# Patient Record
Sex: FEMALE | Race: OTHER | HISPANIC OR LATINO
[De-identification: names, ages, dates, MRNs, and addresses within clinical notes are randomized per-mention and may not be internally consistent; named-entity substitution may affect disease eponyms.]

---

## 2018-12-07 ENCOUNTER — APPOINTMENT (OUTPATIENT)
Dept: PLASTIC SURGERY | Facility: CLINIC | Age: 48
End: 2018-12-07
Payer: SELF-PAY

## 2018-12-07 VITALS
WEIGHT: 145 LBS | SYSTOLIC BLOOD PRESSURE: 118 MMHG | DIASTOLIC BLOOD PRESSURE: 72 MMHG | RESPIRATION RATE: 20 BRPM | OXYGEN SATURATION: 100 % | HEART RATE: 73 BPM | BODY MASS INDEX: 26.68 KG/M2 | TEMPERATURE: 98.2 F | HEIGHT: 62 IN

## 2018-12-07 PROCEDURE — 99201 OFFICE OUTPATIENT NEW 10 MINUTES: CPT | Mod: NC

## 2019-04-26 ENCOUNTER — APPOINTMENT (OUTPATIENT)
Dept: PLASTIC SURGERY | Facility: CLINIC | Age: 49
End: 2019-04-26

## 2019-06-20 ENCOUNTER — APPOINTMENT (OUTPATIENT)
Dept: PLASTIC SURGERY | Facility: CLINIC | Age: 49
End: 2019-06-20
Payer: SELF-PAY

## 2019-06-20 PROCEDURE — 99211 OFF/OP EST MAY X REQ PHY/QHP: CPT | Mod: NC

## 2019-06-20 NOTE — HISTORY OF PRESENT ILLNESS
[FreeTextEntry1] : pt is 49 y/o f c/o abdominal laxity s/p c sections and lipodystrophy  she desires abdominoplasty and dr repair and also liposuction of the hips and flanks  rbal/s outlined

## 2019-06-20 NOTE — ASSESSMENT
[FreeTextEntry1] : Ms. MERLE OBANDO is a 49 year old woman who has consulted with me regarding improving the contour of her  her abdomen. MERLE  is a candidate for abdominoplasty and diastasis rectus repair.  The permanent scar along the lower abdomen from hip to hip and around the umbilicus, was demonstrated and explained.   There is no hernia present and some liposuction may be required along the lateral hip and flank  area to further improve the contour.  Drains will be used and then removed in one week.  The risks, benefits, alternatives, limitations, and the permanent scars were outlined with the patient and She will consider scheduling surgery under general anesthesia at a convenient time.  All questions were answered.\par

## 2019-06-20 NOTE — ASSESSMENT
[FreeTextEntry1] : h/o liposuction will redo areas and do small skin excision rbal/s outlined  limitations of skin laxity and prior scar tissue and surgery discussed and accepted by pt

## 2019-06-20 NOTE — HISTORY OF PRESENT ILLNESS
[FreeTextEntry1] : 2 nd consult  pt adamantly does not want abdominoplasty  she had liposuction and has very wavy and uneven result  she has pfan scar lower abdomen and will accept sl extension for skin removal. pt wishes liposuction abdomen hips upper buttocks lateral breast, chest wall limitations stressed

## 2019-07-17 ENCOUNTER — APPOINTMENT (OUTPATIENT)
Dept: PLASTIC SURGERY | Facility: HOSPITAL | Age: 49
End: 2019-07-17
Payer: SELF-PAY

## 2019-07-17 PROCEDURE — 15847 EXC SKIN ABD ADD-ON: CPT | Mod: NC,59

## 2019-07-17 PROCEDURE — 15877 SUCTION LIPECTOMY TRUNK: CPT

## 2019-07-25 ENCOUNTER — APPOINTMENT (OUTPATIENT)
Dept: PLASTIC SURGERY | Facility: CLINIC | Age: 49
End: 2019-07-25
Payer: SELF-PAY

## 2019-07-25 PROCEDURE — 99024 POSTOP FOLLOW-UP VISIT: CPT

## 2019-07-26 NOTE — ASSESSMENT
[FreeTextEntry1] : excellent post op appearance after liposuction and skin excision . uncomplicated course  pt to rto 2 weeks for prineo removal

## 2019-08-06 ENCOUNTER — APPOINTMENT (OUTPATIENT)
Dept: PLASTIC SURGERY | Facility: CLINIC | Age: 49
End: 2019-08-06

## 2019-08-08 ENCOUNTER — APPOINTMENT (OUTPATIENT)
Dept: PLASTIC SURGERY | Facility: CLINIC | Age: 49
End: 2019-08-08
Payer: SELF-PAY

## 2019-08-08 PROCEDURE — 99024 POSTOP FOLLOW-UP VISIT: CPT

## 2019-08-15 ENCOUNTER — APPOINTMENT (OUTPATIENT)
Dept: PLASTIC SURGERY | Facility: CLINIC | Age: 49
End: 2019-08-15
Payer: SELF-PAY

## 2019-08-15 PROCEDURE — 99024 POSTOP FOLLOW-UP VISIT: CPT

## 2019-09-18 NOTE — ASSESSMENT
[FreeTextEntry1] : excellent early appearance and uncomplicated recovery after sal and mini abdominoplasty

## 2019-09-18 NOTE — HISTORY OF PRESENT ILLNESS
[FreeTextEntry1] : pt is doing very well and is happy with results of her surgery . The patient denies fever,chills, shortness of breath, chest pain, calf pain.  good contour and scar is well healed . \par

## 2019-10-17 ENCOUNTER — APPOINTMENT (OUTPATIENT)
Dept: PLASTIC SURGERY | Facility: CLINIC | Age: 49
End: 2019-10-17
Payer: SELF-PAY

## 2019-10-17 PROCEDURE — 99024 POSTOP FOLLOW-UP VISIT: CPT

## 2019-10-30 NOTE — ASSESSMENT
[FreeTextEntry1] : pt has nice improvements but is not satisfied.  will consider more liposuction inn the office for further reduction upper buttocks possibly lateral chest

## 2019-10-30 NOTE — HISTORY OF PRESENT ILLNESS
[FreeTextEntry1] : pt is happy but wants more liposuction of the buttocks and lateral breast  rbal/s outlined  pt will try to lose some weight and rto in the early spring and if needed will do office procedure

## 2020-01-14 ENCOUNTER — APPOINTMENT (OUTPATIENT)
Dept: PLASTIC SURGERY | Facility: CLINIC | Age: 50
End: 2020-01-14

## 2020-01-16 NOTE — HISTORY OF PRESENT ILLNESS
[FreeTextEntry1] : pt had liposuction and miniabdominoplasty and states that she is happy but desires more off her buttocks and more off her upper lateral breasts.  these are areas that were not even treated the upper buttock contour is excellent where that fat was removed and the lateral chest has excellent contour as well.  pt encouraged to diet and exercise and have a mastopexy for elevation of the nipple.  she can also remove her implants or go to a smaller size  to reduce upper pole fullness

## 2020-01-16 NOTE — ASSESSMENT
[FreeTextEntry1] : pt looks great and surgical sites are excellent .  pt did not want full abdominoplasty and upper abdomen skin is loose as expected  areas of concern are not addressed by liposuction  recommend losing weight and exercising

## 2020-03-20 ENCOUNTER — APPOINTMENT (OUTPATIENT)
Dept: PLASTIC SURGERY | Facility: CLINIC | Age: 50
End: 2020-03-20
Payer: COMMERCIAL

## 2020-03-20 VITALS
HEIGHT: 62 IN | TEMPERATURE: 98.7 F | DIASTOLIC BLOOD PRESSURE: 72 MMHG | WEIGHT: 140 LBS | BODY MASS INDEX: 25.76 KG/M2 | HEART RATE: 67 BPM | SYSTOLIC BLOOD PRESSURE: 111 MMHG | RESPIRATION RATE: 18 BRPM | OXYGEN SATURATION: 99 %

## 2020-03-20 PROCEDURE — 99214 OFFICE O/P EST MOD 30 MIN: CPT

## 2020-03-20 NOTE — PHYSICAL EXAM
[NI] : Normal [de-identified] : nl sens no mass grade 2 ptosis snoopy deformity with ptosis of breast over implants grade 1 capsules no mass

## 2020-03-20 NOTE — ASSESSMENT
[FreeTextEntry1] : ruptured implant r side and pt desires removal of both implants with lollipop mastopexy and no reinsertion of implants at this tiem The risks, benefits, alternatives, limitations and the permanent scars were outlined with the patient. will schedule elective removal and mastopexy bilateral

## 2020-03-20 NOTE — HISTORY OF PRESENT ILLNESS
[FreeTextEntry1] : pt is 48y/o his f s/p breast aug 2005 in [place].  she had mammo sono and showed rupture of the r implant. l side is intact but pt has elected to remove it as the r side needs to be removed . she does not wish to have implants any more because she has increased in size since the birth of her children .  she does however wish to have a mastopexy and is a candidate for vertical scar lollipop mastopexy.  she has soft grade 1 capsules  The risks, benefits, alternatives, limitations and the permanent scars were outlined with the patient. she has normal sensation  bilaterally . I anticipate she will be a b or small c at the most nipples are at 26 cm bialterally

## 2020-04-26 ENCOUNTER — MESSAGE (OUTPATIENT)
Age: 50
End: 2020-04-26

## 2020-06-30 ENCOUNTER — APPOINTMENT (OUTPATIENT)
Dept: PLASTIC SURGERY | Facility: CLINIC | Age: 50
End: 2020-06-30

## 2020-07-08 ENCOUNTER — APPOINTMENT (OUTPATIENT)
Dept: BARIATRICS | Facility: CLINIC | Age: 50
End: 2020-07-08
Payer: COMMERCIAL

## 2020-07-08 VITALS — HEIGHT: 62 IN | WEIGHT: 140 LBS | BODY MASS INDEX: 25.76 KG/M2

## 2020-07-08 DIAGNOSIS — Z83.3 FAMILY HISTORY OF DIABETES MELLITUS: ICD-10-CM

## 2020-07-08 PROCEDURE — 99204 OFFICE O/P NEW MOD 45 MIN: CPT | Mod: 95

## 2020-07-08 NOTE — HISTORY OF PRESENT ILLNESS
[FreeTextEntry1] : 51 y/o Female here for weight loss. \par Struggling with weight gain since she turned 43 y/o and after having her twin children 10 years ago.\par Highest adult weight 150, Lowest Adult Weight 110\par Goal weight 125\par Currently weighs 140\par Childhood: skinny as a child\par LMP: started 7/6/20, uses condoms and cream for contraception\par Previous weight loss attempts:had liposuction in 2019- lost 10 lbs. Exercised at gym and enjoyed swimming before having children. Denies dieting, but tries to eat healthy (salads, lentil soups, vegetables & fruits). Denies snacking/eating dessert.\par Food recall yesterday- B: coffee, L: Ramen soup, D: 1 slice of homemade pizza\par Exercise: walks 2-3 times per week with a friend for 2 hours. Has access to indoor bicycle- not currently using. \par Water intake: sufficient\par Sleep: 8-9 hours per night \par Occupation: works at [employer] [place] [employer]- currently working from home\par Motivation: wants to feel happy/comfortable in a bathing suit\par Pt is scheduled to have surgery on 7/27 for breast implant removal with Dr. Sánchez.

## 2020-07-08 NOTE — ASSESSMENT
[FreeTextEntry1] : Lifestyle modification- keep food journal. Focus on healthy diet: lean meats, vegetables, fruits. Discussed appropriate portion sizes for meals. Will review at next session. Exercise: goal of 2-3 walks per week and add 2 bicycle sessions per week. \par Labs-CBC, CMP, Thyroid function studies, Lipid Panel, A1C\par Medication: pt would like medication for weight loss if she is not successful with lifestyle modification. \par RTO in 1 month to review food journal/labs/options going forward.

## 2020-07-08 NOTE — REASON FOR VISIT
[Home] : at home, [unfilled] , at the time of the visit. [Other Location: e.g. Home (Enter Location, City,State)___] : at [unfilled] [Verbal consent obtained from patient] : the patient, [unfilled] [Initial Consultation] : an initial consultation for [Obesity] : obesity

## 2020-07-21 ENCOUNTER — APPOINTMENT (OUTPATIENT)
Dept: PLASTIC SURGERY | Facility: CLINIC | Age: 50
End: 2020-07-21

## 2020-07-23 ENCOUNTER — APPOINTMENT (OUTPATIENT)
Dept: PLASTIC SURGERY | Facility: CLINIC | Age: 50
End: 2020-07-23
Payer: COMMERCIAL

## 2020-07-23 PROCEDURE — 99215 OFFICE O/P EST HI 40 MIN: CPT

## 2020-07-23 NOTE — ASSESSMENT
[FreeTextEntry1] : pt has ruptured implant and wants both removed and not replaced  .  when she is able she will do a mastopexy but can not do that at this time.  we are currently attempting to get an appeal completed so she can have both implants removed, not just one that is ruptured

## 2020-07-23 NOTE — HISTORY OF PRESENT ILLNESS
[FreeTextEntry1] : pt here for preop discussion regarding the removal of her ruptured implant  . we discussed that the reviewing physician abdi refused to approve both breasts and then decided to rescind the first approval as well.  pt understands we are in a process of appeal and awaiting a decision.  in the event the insurance does not cover both breasts I have assured her that we will try further to care for her in the office if that is possible . The risks, benefits, alternatives, limitations and the permanent scars were outlined with the patient. the patient is very upset at the way the insurance carrier is handling her situation. \par

## 2020-07-23 NOTE — PHYSICAL EXAM
[NI] : Normal [de-identified] : nl sens no mass grade 2 ptosis snoopy deformity with ptosis of breast over implants grade 1 capsules no mass

## 2020-07-23 NOTE — REVIEW OF SYSTEMS
[Recent Weight Gain (___ Lbs)] : recent [unfilled] ~Ulb weight gain [de-identified] : anxiety about her ruptured implant  [Anxiety] : anxiety

## 2020-07-27 NOTE — ASSESSMENT
[FreeTextEntry1] : The pt is prepared for surgery. We will remove both implants and will not replace them  but will do a mastopexy with lollipop scars bilaterally . All of MERLE 's questions were answered completely\par

## 2020-07-27 NOTE — HISTORY OF PRESENT ILLNESS
[FreeTextEntry1] : pt here for ruptured implant which ultimately was approved for ruptured side only . discussed removal of both sides and also discussed mastopexy as cosmetic procedure to be done concurrently  The risks, benefits, alternatives, limitations and the permanent scars were outlined with the patient.\par pt is prepared for lollipop scar and bilateral implant removal without replacement as a combination case cosmetic and insurance  together

## 2020-07-27 NOTE — PHYSICAL EXAM
[de-identified] : nl sens no mass grade 2 ptosis snoopy deformity with ptosis of breast over implants grade 1 capsules no mass  [NI] : Normal

## 2020-07-29 ENCOUNTER — APPOINTMENT (OUTPATIENT)
Dept: PLASTIC SURGERY | Facility: HOSPITAL | Age: 50
End: 2020-07-29
Payer: COMMERCIAL

## 2020-07-29 ENCOUNTER — APPOINTMENT (OUTPATIENT)
Dept: PLASTIC SURGERY | Facility: HOSPITAL | Age: 50
End: 2020-07-29
Payer: SELF-PAY

## 2020-07-29 PROCEDURE — 19316 MASTOPEXY: CPT

## 2020-07-29 PROCEDURE — 15839 EXC EXCESSIVE SKN OTHER AREA: CPT

## 2020-07-29 PROCEDURE — 19330 RMVL RUPTURED BREAST IMPLANT: CPT | Mod: RT

## 2020-08-13 ENCOUNTER — APPOINTMENT (OUTPATIENT)
Dept: PLASTIC SURGERY | Facility: CLINIC | Age: 50
End: 2020-08-13
Payer: COMMERCIAL

## 2020-08-13 VITALS
RESPIRATION RATE: 18 BRPM | HEART RATE: 62 BPM | DIASTOLIC BLOOD PRESSURE: 63 MMHG | TEMPERATURE: 98.3 F | SYSTOLIC BLOOD PRESSURE: 93 MMHG | OXYGEN SATURATION: 98 %

## 2020-08-13 PROCEDURE — 99024 POSTOP FOLLOW-UP VISIT: CPT

## 2020-09-08 ENCOUNTER — APPOINTMENT (OUTPATIENT)
Dept: BARIATRICS | Facility: CLINIC | Age: 50
End: 2020-09-08
Payer: COMMERCIAL

## 2020-09-08 VITALS — HEIGHT: 62 IN | WEIGHT: 148 LBS | BODY MASS INDEX: 27.23 KG/M2

## 2020-09-08 DIAGNOSIS — E88.1 LIPODYSTROPHY, NOT ELSEWHERE CLASSIFIED: ICD-10-CM

## 2020-09-08 DIAGNOSIS — R19.8 OTHER SPECIFIED SYMPTOMS AND SIGNS INVOLVING THE DIGESTIVE SYSTEM AND ABDOMEN: ICD-10-CM

## 2020-09-08 PROCEDURE — 99443: CPT

## 2020-09-11 NOTE — HISTORY OF PRESENT ILLNESS
[FreeTextEntry1] : pt is s/p  removal of bilateral ruptured implants.  opposite side was also noted to be ruptured upon removal.  MERLE  has had uncomplicated recovery from surgery and anesthesia\par The patient denies fever,chills, shortness of breath, chest pain, calf pain\par

## 2020-09-11 NOTE — ASSESSMENT
[FreeTextEntry1] : pt is doing well after removal of her implants . she is happy with shape and  there is no delay in healing

## 2020-09-11 NOTE — HISTORY OF PRESENT ILLNESS
[Other Location: e.g. School (Enter Location, City,State)___] : at [unfilled], at the time of the visit. [Other Location: e.g. Home (Enter Location, City,State)___] : at [unfilled] [Verbal consent obtained from patient] : the patient, [unfilled] [FreeTextEntry1] : 51 y/o Female here for weight loss. \par Struggling with weight gain since she turned 41 y/o and after having her twin children 10 years ago.\par Highest adult weight 150, Lowest Adult Weight 110\par Goal weight 125\par Currently weighs 140\par Childhood: skinny as a child\par LMP: started 7/6/20, uses condoms and cream for contraception\par Previous weight loss attempts:had liposuction in 2019- lost 10 lbs. Exercised at gym and enjoyed swimming before having children. Denies dieting, but tries to eat healthy (salads, lentil soups, vegetables & fruits). Denies snacking/eating dessert.\par Food recall yesterday- B: coffee, L: Ramen soup, D: 1 slice of homemade pizza\par Exercise: walks 2-3 times per week with a friend for 2 hours. Has access to indoor bicycle- not currently using. \par Water intake: sufficient\par Sleep: 8-9 hours per night \par Occupation: works [email or web address] [place] [employer] [place] [employer]- currently working from home\par Motivation: wants to feel happy/comfortable in a bathing suit\par Pt is scheduled to have surgery on 7/27 for breast implant removal with Dr. Sánchez\par \par 9/8/20: Pt unable to connect with [employer] through phone. Telephone discussion done instead. Reports feeling frustrated that she has gained 8 lbs since our last discussion. Has been trying to eat healthy diet, but suffering from cravings/hunger throughout the day. Exercises on indoor bike daily for 30 minutes.Water intake & sleep adequate.

## 2020-09-11 NOTE — ASSESSMENT
[FreeTextEntry1] : Dietary Modification Education: discussed importance of eating 3 meals per day with healthy snacking throughout day. Discussed portion control and healthy snack options. \par Exercise: continue to exercise daily x 30 minutes\par Medication: will start on phentermine 15 mg daily for appetite suppression. Denies hx of glaucoma, CVD, arrythmias, HTN & hyperthyroidism. Educated on side effects including, nausea, constipation, headaches, elevated HR, & dizziness. Sent Rx for BP machine to monitor weekly at home. Call if BP above 140/90 or HR above 100. \par RTO in 1 month or sooner if needed\par Labs: recheck lipid panel at next appt.

## 2020-10-08 ENCOUNTER — APPOINTMENT (OUTPATIENT)
Dept: PLASTIC SURGERY | Facility: CLINIC | Age: 50
End: 2020-10-08

## 2020-10-13 ENCOUNTER — APPOINTMENT (OUTPATIENT)
Dept: BARIATRICS | Facility: CLINIC | Age: 50
End: 2020-10-13
Payer: COMMERCIAL

## 2020-10-13 VITALS — HEIGHT: 62 IN | WEIGHT: 140 LBS | BODY MASS INDEX: 25.76 KG/M2

## 2020-10-13 PROCEDURE — 99443: CPT

## 2020-10-13 NOTE — HISTORY OF PRESENT ILLNESS
[Home] : at home, [unfilled] , at the time of the visit. [Other Location: e.g. Home (Enter Location, City,State)___] : at [unfilled] [Verbal consent obtained from patient] : the patient, [unfilled] [FreeTextEntry1] : 49 y/o Female here for weight loss. \par Struggling with weight gain since she turned 43 y/o and after having her twin children 10 years ago.\par Highest adult weight 150, Lowest Adult Weight 110\par Goal weight 125\par Currently weighs 140\par Childhood: skinny as a child\par LMP: started 7/6/20, uses condoms and cream for contraception\par Previous weight loss attempts:had liposuction in 2019- lost 10 lbs. Exercised at gym and enjoyed swimming before having children. Denies dieting, but tries to eat healthy (salads, lentil soups, vegetables & fruits). Denies snacking/eating dessert.\par Food recall yesterday- B: coffee, L: Ramen soup, D: 1 slice of homemade pizza\par Exercise: walks 2-3 times per week with a friend for 2 hours. Has access to indoor bicycle- not currently using. \par Water intake: sufficient\par Sleep: 8-9 hours per night \par Occupation: works at [employer] in [employer]- currently working from home\par Motivation: wants to feel happy/comfortable in a bathing suit\par Pt is scheduled to have surgery on 7/27 for breast implant removal with Dr. Sánchez\par \par 9/8/20: Pt unable to connect with [facility] through phone. Telephone discussion done instead. Reports feeling frustrated that she has gained 8 lbs since our last discussion. Has been trying to eat healthy diet, but suffering from cravings/hunger throughout the day. Exercises on indoor bike daily for 30 minutes.Water intake & sleep adequate. \par \par 10/13/20: Pt reports feeling well, taking phentermine 15 mg daily and denies side effects at present. Seen by  this month and denies issues with BP/HR. C/o post surgical breast pain which is limiting her exercise regimen, denies redness/swelling of breast, denies fevers. Continues to focus on eating a healthy diet. Reports sufficient appetite suppression in the morning until about 5 pm when she starts to experience cravings & tries to distract herself with cleaning and going to bed early. Water intake sufficient. Reports sleeping very well.

## 2020-10-13 NOTE — ASSESSMENT
[FreeTextEntry1] : Lifestyle modification: continue to focus on healthy dietary choices including high protein, low fat diet. Goal of resuming exercise once breast pain resolves- goal of 3 times per week. \par HLD- ordered Lipid panel, instructed pt to fast before having labs drawn\par Medication: can increase phentermine dose to 15 mg twice per day- first dose before breakfast, second dose at 3 pm to help with evening cravings. If starts to experience insomnia instructed to discontinue second dose. \par BP Monitor- instructed pt to get a BP Monitor to check BP & HR once a month while taking phentermine\par RTO in 2 weeks- scheduled an appt for Oct 27th at 1 pm\par Breast Pain- Pt called Dr. Sánchez's office to schedule an appointment \par

## 2020-10-16 ENCOUNTER — APPOINTMENT (OUTPATIENT)
Dept: PLASTIC SURGERY | Facility: CLINIC | Age: 50
End: 2020-10-16
Payer: COMMERCIAL

## 2020-10-16 DIAGNOSIS — T85.43XA LEAKAGE OF BREAST PROSTHESIS AND IMPLANT, INITIAL ENCOUNTER: ICD-10-CM

## 2020-10-16 PROCEDURE — 99024 POSTOP FOLLOW-UP VISIT: CPT

## 2020-10-16 NOTE — ASSESSMENT
[FreeTextEntry1] : pt has healing with normal tingling and no evidence of delayed healing or complication MERLE  has had uncomplicated recovery from surgery and anesthesia\par  The instructions were reviewed in detail with MERLE.\par  MERLE will return to the office for a post procedure visit in 3 months  sooner prn \par

## 2020-10-16 NOTE — HISTORY OF PRESENT ILLNESS
[FreeTextEntry1] : pt had surgery for removal of her implants in July and was well until last month when she started to have a pinching sensation in both breasts  .  she has had intermittent discomfort  relieved with a supportive bra but worsening at night when she takes off the bra.  pt has normal appearance and moderate scarring  nl sensation 10/10 bilateral good shape and symmetry  no mass seroma or defect.  no Tinel signs  non tender exam/.

## 2020-10-27 ENCOUNTER — APPOINTMENT (OUTPATIENT)
Dept: BARIATRICS | Facility: CLINIC | Age: 50
End: 2020-10-27
Payer: COMMERCIAL

## 2020-10-27 VITALS — WEIGHT: 135 LBS | BODY MASS INDEX: 24.84 KG/M2 | HEIGHT: 62 IN

## 2020-10-27 PROCEDURE — 99214 OFFICE O/P EST MOD 30 MIN: CPT | Mod: 95

## 2020-10-27 NOTE — HISTORY OF PRESENT ILLNESS
[Home] : at home, [unfilled] , at the time of the visit. [Other Location: e.g. Home (Enter Location, City,State)___] : at [unfilled] [FreeTextEntry1] : 49 y/o Female here for weight loss. \par Struggling with weight gain since she turned 43 y/o and after having her twin children 10 years ago.\par Highest adult weight 150, Lowest Adult Weight 110\par Goal weight 125\par Currently weighs 140\par Childhood: skinny as a child\par LMP: started 7/6/20, uses condoms and cream for contraception\par Previous weight loss attempts:had liposuction in 2019- lost 10 lbs. Exercised at gym and enjoyed swimming before having children. Denies dieting, but tries to eat healthy (salads, lentil soups, vegetables & fruits). Denies snacking/eating dessert.\par Food recall yesterday- B: coffee, L: Ramen soup, D: 1 slice of homemade pizza\par Exercise: walks 2-3 times per week with a friend for 2 hours. Has access to indoor bicycle- not currently using. \par Water intake: sufficient\par Sleep: 8-9 hours per night \par Occupation: works at [employer] in [employer]- currently working from home\par Motivation: wants to feel happy/comfortable in a bathing suit\par Pt is scheduled to have surgery on 7/27 for breast implant removal with Dr. Sánchez\par \par 9/8/20: Pt unable to connect with [employer] through phone. Telephone discussion done instead. Reports feeling frustrated that she has gained 8 lbs since our last discussion. Has been trying to eat healthy diet, but suffering from cravings/hunger throughout the day. Exercises on indoor bike daily for 30 minutes.Water intake & sleep adequate. \par \par 10/13/20: Pt reports feeling well, taking phentermine 15 mg daily and denies side effects at present. Seen by  this month and denies issues with BP/HR. C/o post surgical breast pain which is limiting her exercise regimen, denies redness/swelling of breast, denies fevers. Continues to focus on eating a healthy diet. Reports sufficient appetite suppression in the morning until about 5 pm when she starts to experience cravings & tries to distract herself with cleaning and going to bed early. Water intake sufficient. Reports sleeping very well. \par \par 10/27/20: Pt seen via [facility]. Reports feeling well & lost 5 lbs since our last session. Has been trying to focus on eating healthy- inc vegetables and decrease carbs. Struggles on the weekend when she [place] to the farm and has lasagna & apple pie. Just increased the phentermine dose to 2 pills per day, denies side effects. Breast pain has improved since she started wearing a supportive bra, which enables the pt to exercise again. Has been exercising on her bike for 20 mins 3 x week. Gets adequate water intake and sleep. Wasn't able to get her follow up labwork done before today.

## 2020-10-27 NOTE — ASSESSMENT
[FreeTextEntry1] : Lifestyle modification: Healthy diet- high protein, high vegetables, low carb/low fat diet. Exercise: increase regimen to 30 mins 3 times per week\par Medication: continue current dose of phentermine\par Labs: to be done this weekend\par BP Machine: pt plans on going to pharmacy to purchase a BP machine \par RTO in 1 month- will call pt with lab results if done sooner.

## 2020-11-12 ENCOUNTER — APPOINTMENT (OUTPATIENT)
Dept: PLASTIC SURGERY | Facility: CLINIC | Age: 50
End: 2020-11-12

## 2020-12-11 ENCOUNTER — APPOINTMENT (OUTPATIENT)
Dept: BARIATRICS | Facility: CLINIC | Age: 50
End: 2020-12-11
Payer: COMMERCIAL

## 2020-12-11 VITALS — WEIGHT: 130 LBS | BODY MASS INDEX: 23.92 KG/M2 | HEIGHT: 62 IN

## 2020-12-11 PROCEDURE — 99214 OFFICE O/P EST MOD 30 MIN: CPT | Mod: 95

## 2020-12-11 NOTE — HISTORY OF PRESENT ILLNESS
[Home] : at home, [unfilled] , at the time of the visit. [Other Location: e.g. Home (Enter Location, City,State)___] : at [unfilled] [FreeTextEntry1] : 51 y/o Female here for weight loss. \par Struggling with weight gain since she turned 43 y/o and after having her twin children 10 years ago.\par Highest adult weight 150, Lowest Adult Weight 110\par Goal weight 125\par Currently weighs 140\par Childhood: skinny as a child\par LMP: started 7/6/20, uses condoms and cream for contraception\par Previous weight loss attempts:had liposuction in 2019- lost 10 lbs. Exercised at gym and enjoyed swimming before having children. Denies dieting, but tries to eat healthy (salads, lentil soups, vegetables & fruits). Denies snacking/eating dessert.\par Food recall yesterday- B: coffee, L: Ramen soup, D: 1 slice of homemade pizza\par Exercise: walks 2-3 times per week with a friend for 2 hours. Has access to indoor bicycle- not currently using. \par Water intake: sufficient\par Sleep: 8-9 hours per night \par Occupation: works at [employer] in [facility] [employer]- currently working from home\par Motivation: wants to feel happy/comfortable in a bathing suit\par Pt is scheduled to have surgery on 7/27 for breast implant removal with Dr. Sánchez\par \par 9/8/20: Pt unable to connect with [employer] through phone. Telephone discussion done instead. Reports feeling frustrated that she has gained 8 lbs since our last discussion. Has been trying to eat healthy diet, but suffering from cravings/hunger throughout the day. Exercises on indoor bike daily for 30 minutes.Water intake & sleep adequate. \par \par 10/13/20: Pt reports feeling well, taking phentermine 15 mg daily and denies side effects at present. Seen by  this month and denies issues with BP/HR. C/o post surgical breast pain which is limiting her exercise regimen, denies redness/swelling of breast, denies fevers. Continues to focus on eating a healthy diet. Reports sufficient appetite suppression in the morning until about 5 pm when she starts to experience cravings & tries to distract herself with cleaning and going to bed early. Water intake sufficient. Reports sleeping very well. \par \par 10/27/20: Pt seen via Drive. Reports feeling well & lost 5 lbs since our last session. Has been trying to focus on eating healthy- inc vegetables and decrease carbs. Struggles on the weekend when she goes to the farm and has lasagna & apple pie. Just increased the phentermine dose to 2 pills per day, denies side effects. Breast pain has improved since she started wearing a supportive bra, which enables the pt to exercise again. Has been exercising on her bike for 20 mins 3 x week. Gets adequate water intake and sleep. Wasn't able to get her follow up labwork done before today.\par \par 12/11/20: Pt reports feeling well, down to 130 lbs- feels like she has more energy and fits into her clothes better. Continues to take phentermine, which helps with cravings- notices that she struggles with nighttime cravings if she doesn't take afternoon dose. Tries to focus on healthy snacks when she has cravings. Exercises on indoor bike 2 times per week for 30 minutes and feels that legs are tighter. Reviewed lab work results including: elevated LDL and elevated total cholesterol and low Vitamin D. Pts admitted to enjoying cheese every day as well as eating red meats and high fat dairy products.

## 2020-12-11 NOTE — REASON FOR VISIT
[Follow-Up Visit] : a follow-up visit for [Overweight] : overweight [Hyperlipidemia] : hyperlipidemia

## 2020-12-11 NOTE — ASSESSMENT
[FreeTextEntry1] : Lifestyle modification- encouraged increasing exercise goal to 3- 4 times per week. Educated pt on high fat foods to avoid and options for low fat options. Encouraged pt to decrease intake of cheese and red meat. Will send patient additional educational through email. \par RD referral- dietary modification\par Medication- BMI now 23.78- celebrated her success. Discussed that I would not plan on renewing medication once 3 month course is completed and encouraged her to continue lifestyle modification to maintain weight loss. Pt worries about cravings returning. Instructed to decrease dose to 1 pill per day at this point and increase exercise. \par Vit D supplement ordered for Vit D Insufficiency- recheck in 3 months\par RTO in 1 month- scheduled an apt for 1/8 at 8:30 am. \par \par \par \par

## 2020-12-22 ENCOUNTER — APPOINTMENT (OUTPATIENT)
Dept: BARIATRICS | Facility: CLINIC | Age: 50
End: 2020-12-22

## 2021-01-07 ENCOUNTER — APPOINTMENT (OUTPATIENT)
Dept: BARIATRICS | Facility: CLINIC | Age: 51
End: 2021-01-07
Payer: COMMERCIAL

## 2021-01-07 PROCEDURE — 97802 MEDICAL NUTRITION INDIV IN: CPT | Mod: 95

## 2021-01-08 ENCOUNTER — APPOINTMENT (OUTPATIENT)
Dept: BARIATRICS | Facility: CLINIC | Age: 51
End: 2021-01-08
Payer: COMMERCIAL

## 2021-01-08 VITALS — HEIGHT: 62 IN | BODY MASS INDEX: 23.92 KG/M2 | WEIGHT: 130 LBS

## 2021-01-08 VITALS — HEIGHT: 62 IN | WEIGHT: 132 LBS | BODY MASS INDEX: 24.29 KG/M2

## 2021-01-08 PROCEDURE — 99214 OFFICE O/P EST MOD 30 MIN: CPT | Mod: 95

## 2021-01-08 NOTE — HISTORY OF PRESENT ILLNESS
[Home] : at home, [unfilled] , at the time of the visit. [Other Location: e.g. Home (Enter Location, City,State)___] : at [unfilled] [FreeTextEntry1] : 49 y/o Female here for weight loss. \par Struggling with weight gain since she turned 43 y/o and after having her twin children 10 years ago.\par Highest adult weight 150, Lowest Adult Weight 110\par Goal weight 125\par Currently weighs 140\par Childhood: skinny as a child\par LMP: started 7/6/20, uses condoms and cream for contraception\par Previous weight loss attempts:had liposuction in 2019- lost 10 lbs. Exercised at gym and enjoyed swimming before having children. Denies dieting, but tries to eat healthy (salads, lentil soups, vegetables & fruits). Denies snacking/eating dessert.\par Food recall yesterday- B: coffee, L: Ramen soup, D: 1 slice of homemade pizza\par Exercise: walks 2-3 times per week with a friend for 2 hours. Has access to indoor bicycle- not currently using. \par Water intake: sufficient\par Sleep: 8-9 hours per night \par Occupation: works at [employer] in [facility]- currently working from home\par Motivation: wants to feel happy/comfortable in a bathing suit\par Pt is scheduled to have surgery on 7/27 for breast implant removal with Dr. Sánchez\par \par 9/8/20: Pt unable to connect with [employer] through phone. Telephone discussion done instead. Reports feeling frustrated that she has gained 8 lbs since our last discussion. Has been trying to eat healthy diet, but suffering from cravings/hunger throughout the day. Exercises on indoor bike daily for 30 minutes.Water intake & sleep adequate. \par \par 10/13/20: Pt reports feeling well, taking phentermine 15 mg daily and denies side effects at present. Seen by  this month and denies issues with BP/HR. C/o post surgical breast pain which is limiting her exercise regimen, denies redness/swelling of breast, denies fevers. Continues to focus on eating a healthy diet. Reports sufficient appetite suppression in the morning until about 5 pm when she starts to experience cravings & tries to distract herself with cleaning and going to bed early. Water intake sufficient. Reports sleeping very well. \par \par 10/27/20: Pt seen via [employer]. Reports feeling well & lost 5 lbs since our last session. Has been trying to focus on eating healthy- inc vegetables and decrease carbs. Struggles on the weekend when she goes to the farm and has lasagna & apple pie. Just increased the phentermine dose to 2 pills per day, denies side effects. Breast pain has improved since she started wearing a supportive bra, which enables the pt to exercise again. Has been exercising on her bike for 20 mins 3 x week. Gets adequate water intake and sleep. Wasn't able to get her follow up labwork done before today.\par \par 12/11/20: Pt reports feeling well, down to 130 lbs- feels like she has more energy and fits into her clothes better. Continues to take phentermine, which helps with cravings- notices that she struggles with nighttime cravings if she doesn't take afternoon dose. Tries to focus on healthy snacks when she has cravings. Exercises on indoor bike 2 times per week for 30 minutes and feels that legs are tighter. Reviewed lab work results including: elevated LDL and elevated total cholesterol and low Vitamin D. Pts admitted to enjoying cheese every day as well as eating red meats and high fat dairy products. \par \par 1/8/20: Pt gained 2 lbs since our last session. Pt spoke with [facility] recently & was able to verbalize what she has learned about decreasing dietary fat intake. Stopped eating cheese this week and eats meat once per week. Focuses on eating small portion sizes, fish & salads. Has been taking phentermine once daily now and struggles with nighttime cravings. Struggles with meeting exercise goals. +Adequate water intake and sleep.

## 2021-01-08 NOTE — ASSESSMENT
[FreeTextEntry1] : Lifestyle modification: educated on importance of exercise- set a goal to start exercising 3 times per week for 30 minutes while her children play at night.\par Discussed distraction strategies to avoid night time cravings including exercising instead, doing a hobby that incorporates using her hands for ex: making bracelets, coloring, crocheting. \par Discussed healthy options for nighttime cravings which incorporate protein for ex: palmful of nuts, low fat greek yogurt with fresh fruit, protein shake. Suggest drinking cup of tea or water before starting to snack. \par Phentermine- continue once daily to help with cravings. \par Recheck labs in 2 months- Vit D & Lipids

## 2021-04-09 ENCOUNTER — RX RENEWAL (OUTPATIENT)
Age: 51
End: 2021-04-09

## 2021-04-16 ENCOUNTER — APPOINTMENT (OUTPATIENT)
Dept: BARIATRICS | Facility: CLINIC | Age: 51
End: 2021-04-16
Payer: COMMERCIAL

## 2021-04-16 ENCOUNTER — RX RENEWAL (OUTPATIENT)
Age: 51
End: 2021-04-16

## 2021-04-16 VITALS — HEIGHT: 62 IN | WEIGHT: 133 LBS | BODY MASS INDEX: 24.48 KG/M2

## 2021-04-16 VITALS — WEIGHT: 133 LBS | HEIGHT: 61 IN | BODY MASS INDEX: 25.11 KG/M2

## 2021-04-16 PROCEDURE — 99214 OFFICE O/P EST MOD 30 MIN: CPT | Mod: 95

## 2021-04-16 RX ORDER — PHENTERMINE HYDROCHLORIDE 15 MG/1
15 CAPSULE ORAL
Qty: 60 | Refills: 2 | Status: DISCONTINUED | COMMUNITY
Start: 2020-10-13 | End: 2021-04-16

## 2021-04-16 RX ORDER — PHENTERMINE HYDROCHLORIDE 15 MG/1
15 CAPSULE ORAL
Qty: 30 | Refills: 0 | Status: DISCONTINUED | COMMUNITY
Start: 2020-09-08 | End: 2021-04-16

## 2021-04-16 NOTE — HISTORY OF PRESENT ILLNESS
[Home] : at home, [unfilled] , at the time of the visit. [Other Location: e.g. Home (Enter Location, City,State)___] : at [unfilled] [Verbal consent obtained from patient] : the patient, [unfilled] [FreeTextEntry1] : 49 y/o Female here for weight loss. \par Struggling with weight gain since she turned 41 y/o and after having her twin children 10 years ago.\par Highest adult weight 150, Lowest Adult Weight 110\par Goal weight 125\par Currently weighs 140\par Childhood: skinny as a child\par LMP: started 7/6/20, uses condoms and cream for contraception\par Previous weight loss attempts:had liposuction in 2019- lost 10 lbs. Exercised at gym and enjoyed swimming before having children. Denies dieting, but tries to eat healthy (salads, lentil soups, vegetables & fruits). Denies snacking/eating dessert.\par Food recall yesterday- B: coffee, L: Ramen soup, D: 1 slice of homemade pizza\par Exercise: walks 2-3 times per week with a friend for 2 hours. Has access to indoor bicycle- not currently using. \par Water intake: sufficient\par Sleep: 8-9 hours per night \par Occupation: works at [employer] in [facility]- currently working from home\par Motivation: wants to feel happy/comfortable in a bathing suit\par Pt is scheduled to have surgery on 7/27 for breast implant removal with Dr. Sánchez\par \par 9/8/20: Pt unable to connect with [employer] through phone. Telephone discussion done instead. Reports feeling frustrated that she has gained 8 lbs since our last discussion. Has been trying to eat healthy diet, but suffering from cravings/hunger throughout the day. Exercises on indoor bike daily for 30 minutes.Water intake & sleep adequate. \par \par 10/13/20: Pt reports feeling well, taking phentermine 15 mg daily and denies side effects at present. Seen by  this month and denies issues with BP/HR. C/o post surgical breast pain which is limiting her exercise regimen, denies redness/swelling of breast, denies fevers. Continues to focus on eating a healthy diet. Reports sufficient appetite suppression in the morning until about 5 pm when she starts to experience cravings & tries to distract herself with cleaning and going to bed early. Water intake sufficient. Reports sleeping very well. \par \par 10/27/20: Pt seen via [employer]. Reports feeling well & lost 5 lbs since our last session. Has been trying to focus on eating healthy- inc vegetables and decrease carbs. Struggles on the weekend when she goes to the farm and has lasagna & apple pie. Just increased the phentermine dose to 2 pills per day, denies side effects. Breast pain has improved since she started wearing a supportive bra, which enables the pt to exercise again. Has been exercising on her bike for 20 mins 3 x week. Gets adequate water intake and sleep. Wasn't able to get her follow up labwork done before today.\par \par 12/11/20: Pt reports feeling well, down to 130 lbs- feels like she has more energy and fits into her clothes better. Continues to take phentermine, which helps with cravings- notices that she struggles with nighttime cravings if she doesn't take afternoon dose. Tries to focus on healthy snacks when she has cravings. Exercises on indoor bike 2 times per week for 30 minutes and feels that legs are tighter. Reviewed lab work results including: elevated LDL and elevated total cholesterol and low Vitamin D. Pts admitted to enjoying cheese every day as well as eating red meats and high fat dairy products. \par \par 1/8/20: Pt gained 2 lbs since our last session. Pt spoke with [facility] recently & was able to verbalize what she has learned about decreasing dietary fat intake. Stopped eating cheese this week and eats meat once per week. Focuses on eating small portion sizes, fish & salads. Has been taking phentermine once daily now and struggles with nighttime cravings. Struggles with meeting exercise goals. +Adequate water intake and sleep.\par \par 4/16/21: Pt gained 3 lbs since our last session, has been off of phentermine x 1 month and feels increased craving. Struggles with cravings for: french fries, pizza, and ice cream at night. Tries to focus on eating healthy throughout the day with salads & tuna fish and fruit throughout the day. + adequate water intake, + adequate sleep. Hasn't been exercising, trying to walk more since the weather is better. Says her height is 5 ft 1 not 5 ft 2.

## 2021-04-16 NOTE — ASSESSMENT
[FreeTextEntry1] : Dietary Education provided: recommend incorporating more lean protein and adding protein to snack options. Discussed healthy snack options. Educated on high fat foods to avoid including fried foods and cheese.\par Exercise: goal to start walking/ exercising at least 3 times per week\par Medication- pt would like to resume phentermine, do not recommend at this time based on current BMI, will re-evaluate at next session.\par Labs- ordered and will send script to pt\par RTO in 1 month for follow up - scheduled an apt for 5/18 at 8:30 am

## 2021-05-18 ENCOUNTER — APPOINTMENT (OUTPATIENT)
Dept: BARIATRICS | Facility: CLINIC | Age: 51
End: 2021-05-18
Payer: COMMERCIAL

## 2021-05-18 VITALS — WEIGHT: 138 LBS | BODY MASS INDEX: 26.06 KG/M2 | HEIGHT: 61 IN

## 2021-05-18 PROCEDURE — 99214 OFFICE O/P EST MOD 30 MIN: CPT | Mod: 95

## 2021-05-18 NOTE — HISTORY OF PRESENT ILLNESS
[Home] : at home, [unfilled] , at the time of the visit. [Other Location: e.g. Home (Enter Location, City,State)___] : at [unfilled] [Verbal consent obtained from patient] : the patient, [unfilled] [FreeTextEntry1] : 49 y/o Female here for weight loss. \par Struggling with weight gain since she turned 43 y/o and after having her twin children 10 years ago.\par Highest adult weight 150, Lowest Adult Weight 110\par Goal weight 125\par Currently weighs 140\par Childhood: skinny as a child\par LMP: started 7/6/20, uses condoms and cream for contraception\par Previous weight loss attempts:had liposuction in 2019- lost 10 lbs. Exercised at gym and enjoyed swimming before having children. Denies dieting, but tries to eat healthy (salads, lentil soups, vegetables & fruits). Denies snacking/eating dessert.\par Food recall yesterday- B: coffee, L: Ramen soup, D: 1 slice of homemade pizza\par Exercise: walks 2-3 times per week with a friend for 2 hours. Has access to indoor bicycle- not currently using. \par Water intake: sufficient\par Sleep: 8-9 hours per night \par Occupation: works at [employer] in [facility] [employer]- currently working from home\par Motivation: wants to feel happy/comfortable in a bathing suit\par Pt is scheduled to have surgery on 7/27 for breast implant removal with Dr. Sánchez\par \par 9/8/20: Pt unable to connect with [employer] through phone. Telephone discussion done instead. Reports feeling frustrated that she has gained 8 lbs since our last discussion. Has been trying to eat healthy diet, but suffering from cravings/hunger throughout the day. Exercises on indoor bike daily for 30 minutes.Water intake & sleep adequate. \par \par 10/13/20: Pt reports feeling well, taking phentermine 15 mg daily and denies side effects at present. Seen by  this month and denies issues with BP/HR. C/o post surgical breast pain which is limiting her exercise regimen, denies redness/swelling of breast, denies fevers. Continues to focus on eating a healthy diet. Reports sufficient appetite suppression in the morning until about 5 pm when she starts to experience cravings & tries to distract herself with cleaning and going to bed early. Water intake sufficient. Reports sleeping very well. \par \par 10/27/20: Pt seen via [facility]. Reports feeling well & lost 5 lbs since our last session. Has been trying to focus on eating healthy- inc vegetables and decrease carbs. Struggles on the weekend when she goes to the farm and has lasagna & apple pie. Just increased the phentermine dose to 2 pills per day, denies side effects. Breast pain has improved since she started wearing a supportive bra, which enables the pt to exercise again. Has been exercising on her bike for 20 mins 3 x week. Gets adequate water intake and sleep. Wasn't able to get her follow up labwork done before today.\par \par 12/11/20: Pt reports feeling well, down to 130 lbs- feels like she has more energy and fits into her clothes better. Continues to take phentermine, which helps with cravings- notices that she struggles with nighttime cravings if she doesn't take afternoon dose. Tries to focus on healthy snacks when she has cravings. Exercises on indoor bike 2 times per week for 30 minutes and feels that legs are tighter. Reviewed lab work results including: elevated LDL and elevated total cholesterol and low Vitamin D. Pts admitted to enjoying cheese every day as well as eating red meats and high fat dairy products. \par \par 1/8/20: Pt gained 2 lbs since our last session. Pt spoke with [facility] recently & was able to verbalize what she has learned about decreasing dietary fat intake. Stopped eating cheese this week and eats meat once per week. Focuses on eating small portion sizes, fish & salads. Has been taking phentermine once daily now and struggles with nighttime cravings. Struggles with meeting exercise goals. +Adequate water intake and sleep.\par \par 4/16/21: Pt gained 3 lbs since our last session, has been off of phentermine x 1 month and feels increased craving. Struggles with cravings for: french fries, pizza, and ice cream at night. Tries to focus on eating healthy throughout the day with salads & tuna fish and fruit throughout the day. + adequate water intake, + adequate sleep. Hasn't been exercising, trying to walk more since the weather is better. Says her height is 5 ft 1 not 5 ft 2. \par \par 5/18/21: Pt gained 5 lbs since our last session, struggling with cravings. Has children who enjoy eating bagels, pizza, cheetos, muffins. Food recall: B- bagel, L-salad with grilled chicken, chickpeas, avacodo, tomotoes, lemon for dressing, D- same salad, Snacks: 2 mangos and corn muffin. Water intake adequate. Denies formal exercising, has been walking outside on occasions and taking care of garden.

## 2021-05-18 NOTE — ASSESSMENT
[FreeTextEntry1] : Educated on dietary modification recommendations- discussed substituting simple carbs to more complex carbs. Encouraged to eat boiled eggs, oatmeal, vegetables/fruit for breakfast to decrease cravings for sugar throughout the day. Try to keep unhealthy snacks out of the house or out of sight. Recommend low fat diet and decrease intake of red meat, pizza, fried foods, cheese. \par Exercise: goal to exercise on treadmill/exercise machine for 15 minutes daily or take brisk walk outside. \par Labs- to be done fasting, plans to go to [facility] this weekend\par RTO in 3 weeks- scheduled an apt for 6/11 at 8:30 am

## 2021-06-11 ENCOUNTER — APPOINTMENT (OUTPATIENT)
Dept: BARIATRICS | Facility: CLINIC | Age: 51
End: 2021-06-11
Payer: COMMERCIAL

## 2021-06-11 VITALS — HEIGHT: 61 IN | BODY MASS INDEX: 26.43 KG/M2 | WEIGHT: 140 LBS

## 2021-06-11 VITALS — BODY MASS INDEX: 26.43 KG/M2 | WEIGHT: 140 LBS | HEIGHT: 61 IN

## 2021-06-11 PROCEDURE — 99214 OFFICE O/P EST MOD 30 MIN: CPT | Mod: 95

## 2021-06-11 NOTE — HISTORY OF PRESENT ILLNESS
[Home] : at home, [unfilled] , at the time of the visit. [Other Location: e.g. Home (Enter Location, City,State)___] : at [unfilled] [Verbal consent obtained from patient] : the patient, [unfilled] [FreeTextEntry1] : 51 y/o Female here for weight loss. \par Struggling with weight gain since she turned 43 y/o and after having her twin children 10 years ago.\par Highest adult weight 150, Lowest Adult Weight 110\par Goal weight 125\par Currently weighs 140\par Childhood: skinny as a child\par LMP: started 7/6/20, uses condoms and cream for contraception\par Previous weight loss attempts:had liposuction in 2019- lost 10 lbs. Exercised at gym and enjoyed swimming before having children. Denies dieting, but tries to eat healthy (salads, lentil soups, vegetables & fruits). Denies snacking/eating dessert.\par Food recall yesterday- B: coffee, L: Ramen soup, D: 1 slice of homemade pizza\par Exercise: walks 2-3 times per week with a friend for 2 hours. Has access to indoor bicycle- not currently using. \par Water intake: sufficient\par Sleep: 8-9 hours per night \par Occupation: works at [employer] in [facility]- currently working from home\par Motivation: wants to feel happy/comfortable in a bathing suit\par Pt is scheduled to have surgery on 7/27 for breast implant removal with Dr. Sánchez\par \par 9/8/20: Pt unable to connect with [employer] through phone. Telephone discussion done instead. Reports feeling frustrated that she has gained 8 lbs since our last discussion. Has been trying to eat healthy diet, but suffering from cravings/hunger throughout the day. Exercises on indoor bike daily for 30 minutes.Water intake & sleep adequate. \par \par 10/13/20: Pt reports feeling well, taking phentermine 15 mg daily and denies side effects at present. Seen by  this month and denies issues with BP/HR. C/o post surgical breast pain which is limiting her exercise regimen, denies redness/swelling of breast, denies fevers. Continues to focus on eating a healthy diet. Reports sufficient appetite suppression in the morning until about 5 pm when she starts to experience cravings & tries to distract herself with cleaning and going to bed early. Water intake sufficient. Reports sleeping very well. \par \par 10/27/20: Pt seen via [facility]. Reports feeling well & lost 5 lbs since our last session. Has been trying to focus on eating healthy- inc vegetables and decrease carbs. Struggles on the weekend when she goes to the farm and has lasagna & apple pie. Just increased the phentermine dose to 2 pills per day, denies side effects. Breast pain has improved since she started wearing a supportive bra, which enables the pt to exercise again. Has been exercising on her bike for 20 mins 3 x week. Gets adequate water intake and sleep. Wasn't able to get her follow up labwork done before today.\par \par 12/11/20: Pt reports feeling well, down to 130 lbs- feels like she has more energy and fits into her clothes better. Continues to take phentermine, which helps with cravings- notices that she struggles with nighttime cravings if she doesn't take afternoon dose. Tries to focus on healthy snacks when she has cravings. Exercises on indoor bike 2 times per week for 30 minutes and feels that legs are tighter. Reviewed lab work results including: elevated LDL and elevated total cholesterol and low Vitamin D. Pts admitted to enjoying cheese every day as well as eating red meats and high fat dairy products. \par \par 1/8/20: Pt gained 2 lbs since our last session. Pt spoke with [facility] recently & was able to verbalize what she has learned about decreasing dietary fat intake. Stopped eating cheese this week and eats meat once per week. Focuses on eating small portion sizes, fish & salads. Has been taking phentermine once daily now and struggles with nighttime cravings. Struggles with meeting exercise goals. +Adequate water intake and sleep.\par \par 4/16/21: Pt gained 3 lbs since our last session, has been off of phentermine x 1 month and feels increased craving. Struggles with cravings for: french fries, pizza, and ice cream at night. Tries to focus on eating healthy throughout the day with salads & tuna fish and fruit throughout the day. + adequate water intake, + adequate sleep. Hasn't been exercising, trying to walk more since the weather is better. Says her height is 5 ft 1 not 5 ft 2. \par \par 5/18/21: Pt gained 5 lbs since our last session, struggling with cravings. Has children who enjoy eating bagels, pizza, cheetos, muffins. Food recall: B- bagel, L-salad with grilled chicken, chickpeas, avacodo, tomotoes, lemon for dressing, D- same salad, Snacks: 2 mangos and corn muffin. Water intake adequate. Denies formal exercising, has been walking outside on occasions and taking care of garden. \par \par 6/11/21: Continues to struggle with evening cravings and gained 2 lbs since our last session. BMI now 26.45. Pt is upset and wants to restart Phentermine to help with cravings. Ate a salad with grilled chicken & tomatoes, felt hungry an hour later and had 2 additional pieces of grilled chicken, felt hungry an hour later and ate popcorn. Trying to eat fruit for a snack most nights. Increased exercise regimen to 4 days per week walking 30 minutes.

## 2021-06-11 NOTE — ASSESSMENT
[FreeTextEntry1] : Dietary modification- discussed mindful eating, will email educational video on this. Recommend eating fruit & protein snacks at night or try to use distraction techniques to avoid hunger. Recommend taking a walk at night or doing a hobby that involves hands- puzzling, coloring, art work. \par Exercise: continue to walk 4 times per week \par Medication- will likely meet criteria to restart phentermine soon, instructed to email me if she continues to gain weight before our next session and will restart once she meets criteria.Discussed common side effects. \par Labs- due for lab work, pt to get this done ASAP\par Scheduled a follow up apt for 7/19/21 at 8:30 am

## 2021-06-16 ENCOUNTER — LABORATORY RESULT (OUTPATIENT)
Age: 51
End: 2021-06-16

## 2021-06-18 VITALS — WEIGHT: 142 LBS | HEIGHT: 61 IN | BODY MASS INDEX: 26.81 KG/M2

## 2021-07-08 ENCOUNTER — APPOINTMENT (OUTPATIENT)
Dept: BARIATRICS | Facility: CLINIC | Age: 51
End: 2021-07-08
Payer: COMMERCIAL

## 2021-07-08 VITALS — HEIGHT: 61 IN | WEIGHT: 142 LBS | BODY MASS INDEX: 26.81 KG/M2

## 2021-07-08 PROCEDURE — 99214 OFFICE O/P EST MOD 30 MIN: CPT | Mod: 95

## 2021-07-08 NOTE — HISTORY OF PRESENT ILLNESS
[FreeTextEntry1] : 51 y/o Female here for weight loss. \par Struggling with weight gain since she turned 43 y/o and after having her twin children 10 years ago.\par Highest adult weight 150, Lowest Adult Weight 110\par Goal weight 125\par Currently weighs 140\par Childhood: skinny as a child\par LMP: started 7/6/20, uses condoms and cream for contraception\par Previous weight loss attempts:had liposuction in 2019- lost 10 lbs. Exercised at gym and enjoyed swimming before having children. Denies dieting, but tries to eat healthy (salads, lentil soups, vegetables & fruits). Denies snacking/eating dessert.\par Food recall yesterday- B: coffee, L: Ramen soup, D: 1 slice of homemade pizza\par Exercise: walks 2-3 times per week with a friend for 2 hours. Has access to indoor bicycle- not currently using. \par Water intake: sufficient\par Sleep: 8-9 hours per night \par Occupation: works at [employer] in [facility]- currently working from home\par Motivation: wants to feel happy/comfortable in a bathing suit\par Pt is scheduled to have surgery on 7/27 for breast implant removal with Dr. Sánchez\par \par 9/8/20: Pt unable to connect with [employer] through phone. Telephone discussion done instead. Reports feeling frustrated that she has gained 8 lbs since our last discussion. Has been trying to eat healthy diet, but suffering from cravings/hunger throughout the day. Exercises on indoor bike daily for 30 minutes.Water intake & sleep adequate. \par \par 10/13/20: Pt reports feeling well, taking phentermine 15 mg daily and denies side effects at present. Seen by  this month and denies issues with BP/HR. C/o post surgical breast pain which is limiting her exercise regimen, denies redness/swelling of breast, denies fevers. Continues to focus on eating a healthy diet. Reports sufficient appetite suppression in the morning until about 5 pm when she starts to experience cravings & tries to distract herself with cleaning and going to bed early. Water intake sufficient. Reports sleeping very well. \par \par 10/27/20: Pt seen via [facility]. Reports feeling well & lost 5 lbs since our last session. Has been trying to focus on eating healthy- inc vegetables and decrease carbs. Struggles on the weekend when she goes to the farm and has lasagna & apple pie. Just increased the phentermine dose to 2 pills per day, denies side effects. Breast pain has improved since she started wearing a supportive bra, which enables the pt to exercise again. Has been exercising on her bike for 20 mins 3 x week. Gets adequate water intake and sleep. Wasn't able to get her follow up labwork done before today.\par \par 12/11/20: Pt reports feeling well, down to 130 lbs- feels like she has more energy and fits into her clothes better. Continues to take phentermine, which helps with cravings- notices that she struggles with nighttime cravings if she doesn't take afternoon dose. Tries to focus on healthy snacks when she has cravings. Exercises on indoor bike 2 times per week for 30 minutes and feels that legs are tighter. Reviewed lab work results including: elevated LDL and elevated total cholesterol and low Vitamin D. Pts admitted to enjoying cheese every day as well as eating red meats and high fat dairy products. \par \par 1/8/20: Pt gained 2 lbs since our last session. Pt spoke with [facility] [place] recently & was able to verbalize what she has learned about decreasing dietary fat intake. Stopped eating cheese this week and eats meat once per week. Focuses on eating small portion sizes, fish & salads. Has been taking phentermine once daily now and struggles with nighttime cravings. Struggles with meeting exercise goals. +Adequate water intake and sleep.\par \par 4/16/21: Pt gained 3 lbs since our last session, has been off of phentermine x 1 month and feels increased craving. Struggles with cravings for: french fries, pizza, and ice cream at night. Tries to focus on eating healthy throughout the day with salads & tuna fish and fruit throughout the day. + adequate water intake, + adequate sleep. Hasn't been exercising, trying to walk more since the weather is better. Says her height is 5 ft 1 not 5 ft 2. \par \par 5/18/21: Pt gained 5 lbs since our last session, struggling with cravings. Has children who enjoy eating bagels, pizza, cheetos, muffins. Food recall: B- bagel, L-salad with grilled chicken, chickpeas, avacodo, tomotoes, lemon for dressing, D- same salad, Snacks: 2 mangos and corn muffin. Water intake adequate. Denies formal exercising, has been walking outside on occasions and taking care of garden. \par \par 6/11/21: Continues to struggle with evening cravings and gained 2 lbs since our last session. BMI now 26.45. Pt is upset and wants to restart Phentermine to help with cravings. Ate a salad with grilled chicken & tomatoes, felt hungry an hour later and had 2 additional pieces of grilled chicken, felt hungry an hour later and ate popcorn. Trying to eat fruit for a snack most nights. Increased exercise regimen to 4 days per week walking 30 minutes. \par \par 7/8/21: Struggling with cravings and feels that her clothes fit tighter. Hasn't weighed herself today, last weight was 142 lbs. Ate a bagel with cream cheese this morning and snacking often. Walking for exercise 4 days per week x 30 mins. LMP 6/5/21, not currently on birth control. Requests medication to help with cravings.

## 2021-07-08 NOTE — ASSESSMENT
[FreeTextEntry1] : Dietary modification education provided- avoid whole fat dairy products, pizza, fried foods. Recommend low fat diet with good fat sources including avacado, nuts, fish. \par Exercise- discussed the importance of increasing her exercise regimen to incorporate strength training/ weights/ resistance bands. Referred to EP program\par Pt to weight herself and let me know updated weight via email\par Recommend mindful eating- sent email with educational video\par Discussed potentially starting contrave if she meets criteria to initiate medication treatment of obesity. Sent information about contrave via email.\par RTO in 2-4 weeks \par

## 2021-07-08 NOTE — REASON FOR VISIT
[Follow-Up Visit] : a follow-up visit for [Hyperlipidemia] : hyperlipidemia [Overweight] : overweight [Home] : at home, [unfilled] , at the time of the visit. [Other Location: e.g. Home (Enter Location, City,State)___] : at [unfilled] [Verbal consent obtained from patient] : the patient, [unfilled]

## 2021-07-12 VITALS — HEIGHT: 61 IN | BODY MASS INDEX: 27.19 KG/M2 | WEIGHT: 144 LBS

## 2021-07-13 RX ORDER — NALTREXONE HYDROCHLORIDE AND BUPROPION HYDROCHLORIDE 8; 90 MG/1; MG/1
8-90 TABLET, EXTENDED RELEASE ORAL
Qty: 70 | Refills: 0 | Status: DISCONTINUED | COMMUNITY
Start: 2021-07-13 | End: 2021-07-13

## 2021-07-19 ENCOUNTER — APPOINTMENT (OUTPATIENT)
Dept: BARIATRICS | Facility: CLINIC | Age: 51
End: 2021-07-19

## 2021-09-21 ENCOUNTER — APPOINTMENT (OUTPATIENT)
Dept: PLASTIC SURGERY | Facility: CLINIC | Age: 51
End: 2021-09-21
Payer: SELF-PAY

## 2021-09-21 ENCOUNTER — APPOINTMENT (OUTPATIENT)
Dept: BARIATRICS/WEIGHT MGMT | Facility: CLINIC | Age: 51
End: 2021-09-21
Payer: COMMERCIAL

## 2021-09-21 VITALS
HEART RATE: 78 BPM | RESPIRATION RATE: 20 BRPM | SYSTOLIC BLOOD PRESSURE: 112 MMHG | TEMPERATURE: 98.5 F | DIASTOLIC BLOOD PRESSURE: 68 MMHG | OXYGEN SATURATION: 98 %

## 2021-09-21 VITALS — BODY MASS INDEX: 26.43 KG/M2 | WEIGHT: 140 LBS | HEIGHT: 61 IN

## 2021-09-21 DIAGNOSIS — N64.81 PTOSIS OF BREAST: ICD-10-CM

## 2021-09-21 DIAGNOSIS — Z00.00 ENCOUNTER FOR GENERAL ADULT MEDICAL EXAMINATION W/OUT ABNORMAL FINDINGS: ICD-10-CM

## 2021-09-21 PROCEDURE — 99214 OFFICE O/P EST MOD 30 MIN: CPT | Mod: 95

## 2021-09-21 PROCEDURE — 99024 POSTOP FOLLOW-UP VISIT: CPT

## 2021-09-21 NOTE — HISTORY OF PRESENT ILLNESS
[Home] : at home, [unfilled] , at the time of the visit. [Other Location: e.g. Home (Enter Location, City,State)___] : at [unfilled] [Verbal consent obtained from patient] : the patient, [unfilled] [FreeTextEntry1] : 49 y/o Female here for weight loss. \par Struggling with weight gain since she turned 41 y/o and after having her twin children 10 years ago.\par Highest adult weight 150, Lowest Adult Weight 110\par Goal weight 125\par Currently weighs 140\par Childhood: skinny as a child\par LMP: started 7/6/20, uses condoms and cream for contraception\par Previous weight loss attempts:had liposuction in 2019- lost 10 lbs. Exercised at gym and enjoyed swimming before having children. Denies dieting, but tries to eat healthy (salads, lentil soups, vegetables & fruits). Denies snacking/eating dessert.\par Food recall yesterday- B: coffee, L: Ramen soup, D: 1 slice of homemade pizza\par Exercise: walks 2-3 times per week with a friend for 2 hours. Has access to indoor bicycle- not currently using. \par Water intake: sufficient\par Sleep: 8-9 hours per night \par Occupation: works at [employer] in [facility]- currently working from home\par Motivation: wants to feel happy/comfortable in a bathing suit\par Pt is scheduled to have surgery on 7/27 for breast implant removal with Dr. Sánchez\par \par 9/8/20: Pt unable to connect with [employer] through phone. Telephone discussion done instead. Reports feeling frustrated that she has gained 8 lbs since our last discussion. Has been trying to eat healthy diet, but suffering from cravings/hunger throughout the day. Exercises on indoor bike daily for 30 minutes.Water intake & sleep adequate. \par \par 10/13/20: Pt reports feeling well, taking phentermine 15 mg daily and denies side effects at present. Seen by  this month and denies issues with BP/HR. C/o post surgical breast pain which is limiting her exercise regimen, denies redness/swelling of breast, denies fevers. Continues to focus on eating a healthy diet. Reports sufficient appetite suppression in the morning until about 5 pm when she starts to experience cravings & tries to distract herself with cleaning and going to bed early. Water intake sufficient. Reports sleeping very well. \par \par 10/27/20: Pt seen via [facility]. Reports feeling well & lost 5 lbs since our last session. Has been trying to focus on eating healthy- inc vegetables and decrease carbs. Struggles on the weekend when she goes to the farm and has lasagna & apple pie. Just increased the phentermine dose to 2 pills per day, denies side effects. Breast pain has improved since she started wearing a supportive bra, which enables the pt to exercise again. Has been exercising on her bike for 20 mins 3 x week. Gets adequate water intake and sleep. Wasn't able to get her follow up labwork done before today.\par \par 12/11/20: Pt reports feeling well, down to 130 lbs- feels like she has more energy and fits into her clothes better. Continues to take phentermine, which helps with cravings- notices that she struggles with nighttime cravings if she doesn't take afternoon dose. Tries to focus on healthy snacks when she has cravings. Exercises on indoor bike 2 times per week for 30 minutes and feels that legs are tighter. Reviewed lab work results including: elevated LDL and elevated total cholesterol and low Vitamin D. Pts admitted to enjoying cheese every day as well as eating red meats and high fat dairy products. \par \par 1/8/20: Pt gained 2 lbs since our last session. Pt spoke with [facility] [place] recently & was able to verbalize what she has learned about decreasing dietary fat intake. Stopped eating cheese this week and eats meat once per week. Focuses on eating small portion sizes, fish & salads. Has been taking phentermine once daily now and struggles with nighttime cravings. Struggles with meeting exercise goals. +Adequate water intake and sleep.\par \par 4/16/21: Pt gained 3 lbs since our last session, has been off of phentermine x 1 month and feels increased craving. Struggles with cravings for: french fries, pizza, and ice cream at night. Tries to focus on eating healthy throughout the day with salads & tuna fish and fruit throughout the day. + adequate water intake, + adequate sleep. Hasn't been exercising, trying to walk more since the weather is better. Says her height is 5 ft 1 not 5 ft 2. \par \par 5/18/21: Pt gained 5 lbs since our last session, struggling with cravings. Has children who enjoy eating bagels, pizza, cheetos, muffins. Food recall: B- bagel, L-salad with grilled chicken, chickpeas, avacodo, tomotoes, lemon for dressing, D- same salad, Snacks: 2 mangos and corn muffin. Water intake adequate. Denies formal exercising, has been walking outside on occasions and taking care of garden. \par \par 6/11/21: Continues to struggle with evening cravings and gained 2 lbs since our last session. BMI now 26.45. Pt is upset and wants to restart Phentermine to help with cravings. Ate a salad with grilled chicken & tomatoes, felt hungry an hour later and had 2 additional pieces of grilled chicken, felt hungry an hour later and ate popcorn. Trying to eat fruit for a snack most nights. Increased exercise regimen to 4 days per week walking 30 minutes. \par \par 7/8/21: Struggling with cravings and feels that her clothes fit tighter. Hasn't weighed herself today, last weight was 142 lbs. Ate a bagel with cream cheese this morning and snacking often. Walking for exercise 4 days per week x 30 mins. LMP 6/5/21, not currently on birth control. Requests medication to help with cravings.\par \par 9/21/21: Lost 4 lbs since last session. Took phentermine for 1 month and reports better control with food choices- reports eating more salads, grilled chicken tuna salad, turkey burgers, vegetables, and fruit. Walking for exercise 4 days per week. Water intake adequate. Has been off phentermine for 1 week and reports increased cravings at night r/t anxiety/emotional eating.

## 2021-09-21 NOTE — ASSESSMENT
[FreeTextEntry1] : Dietary Education provided: recommend eating healthy snacks when she has nighttime cravings or using coping techniques at this time- deep breathing, mindful eating, exercise. \par Educated on high fat foods to avoid including fried foods and cheese, discussed healthy fats including fish/ omega 3 fatty acids. \par Exercise: recommend increasing exercise routine. Goal to start using exercise bike 3-4 times per week for 30-45 mins \par Medication- Good effects on phentermine 15 mg daily, can continue x 2 months and then will discontinue at that time. Recommend getting into a healthy lifestyle routine over these next 2 months that she can continue once the medication is complete. \par Mental Health- recommend seeing a therapist to help with anxiety/coping skills to decrease emotional eating behaviors. Given information on [facility]. \par RTO in 1 month for follow up - scheduled an apt for 10/19 at 8am

## 2021-10-12 PROBLEM — N64.81 PTOSIS OF BREAST: Status: RESOLVED | Noted: 2020-07-27 | Resolved: 2021-10-12

## 2021-10-12 NOTE — ASSESSMENT
[FreeTextEntry1] : pt looks well and has some complaints about residual lipodystrophy .  contour is excellent and pt reassured MERLE will return to the office for a post procedure visit prn

## 2022-01-18 ENCOUNTER — APPOINTMENT (OUTPATIENT)
Dept: BARIATRICS/WEIGHT MGMT | Facility: CLINIC | Age: 52
End: 2022-01-18
Payer: COMMERCIAL

## 2022-01-18 VITALS — HEIGHT: 61 IN | BODY MASS INDEX: 26.81 KG/M2 | WEIGHT: 142 LBS

## 2022-01-18 PROCEDURE — 99215 OFFICE O/P EST HI 40 MIN: CPT | Mod: 95

## 2022-01-18 RX ORDER — PHENTERMINE HYDROCHLORIDE 15 MG/1
15 CAPSULE ORAL
Qty: 30 | Refills: 0 | Status: DISCONTINUED | COMMUNITY
Start: 2021-07-13 | End: 2022-01-18

## 2022-01-18 NOTE — HISTORY OF PRESENT ILLNESS
[Home] : at home, [unfilled] , at the time of the visit. [Other Location: e.g. Home (Enter Location, City,State)___] : at [unfilled] [Verbal consent obtained from patient] : the patient, [unfilled] [FreeTextEntry1] : 52 y/o Female here for weight loss. \par Struggling with weight gain since she turned 43 y/o and after having her twin children 10 years ago.\par Highest adult weight 150, Lowest Adult Weight 110\par Goal weight 125\par Currently weighs 140\par Childhood: skinny as a child\par LMP: started 7/6/20, uses condoms and cream for contraception\par Previous weight loss attempts:had liposuction in 2019- lost 10 lbs. Exercised at gym and enjoyed swimming before having children. Denies dieting, but tries to eat healthy (salads, lentil soups, vegetables & fruits). Denies snacking/eating dessert.\par Food recall yesterday- B: coffee, L: Ramen soup, D: 1 slice of homemade pizza\par Exercise: walks 2-3 times per week with a friend for 2 hours. Has access to indoor bicycle- not currently using. \par Water intake: sufficient\par Sleep: 8-9 hours per night \par Occupation: works at [employer] in [facility]- currently working from home\par Motivation: wants to feel happy/comfortable in a bathing suit\par Pt is scheduled to have surgery on 7/27 for breast implant removal with Dr. Sánchez\par \par 9/8/20: Pt unable to connect with [email or web address] through phone. Telephone discussion done instead. Reports feeling frustrated that she has gained 8 lbs since our last discussion. Has been trying to eat healthy diet, but suffering from cravings/hunger throughout the day. Exercises on indoor bike daily for 30 minutes.Water intake & sleep adequate. \par \par 10/13/20: Pt reports feeling well, taking phentermine 15 mg daily and denies side effects at present. Seen by  this month and denies issues with BP/HR. C/o post surgical breast pain which is limiting her exercise regimen, denies redness/swelling of breast, denies fevers. Continues to focus on eating a healthy diet. Reports sufficient appetite suppression in the morning until about 5 pm when she starts to experience cravings & tries to distract herself with cleaning and going to bed early. Water intake sufficient. Reports sleeping very well. \par \par 10/27/20: Pt seen via [facility]. Reports feeling well & lost 5 lbs since our last session. Has been trying to focus on eating healthy- inc vegetables and decrease carbs. Struggles on the weekend when she goes to the farm and has lasagna & apple pie. Just increased the phentermine dose to 2 pills per day, denies side effects. Breast pain has improved since she started wearing a supportive bra, which enables the pt to exercise again. Has been exercising on her bike for 20 mins 3 x week. Gets adequate water intake and sleep. Wasn't able to get her follow up labwork done before today.\par \par 12/11/20: Pt reports feeling well, down to 130 lbs- feels like she has more energy and fits into her clothes better. Continues to take phentermine, which helps with cravings- notices that she struggles with nighttime cravings if she doesn't take afternoon dose. Tries to focus on healthy snacks when she has cravings. Exercises on indoor bike 2 times per week for 30 minutes and feels that legs are tighter. Reviewed lab work results including: elevated LDL and elevated total cholesterol and low Vitamin D. Pts admitted to enjoying cheese every day as well as eating red meats and high fat dairy products. \par \par 1/8/20: Pt gained 2 lbs since our last session. Pt spoke with [facility] [place] recently & was able to verbalize what she has learned about decreasing dietary fat intake. Stopped eating cheese this week and eats meat once per week. Focuses on eating small portion sizes, fish & salads. Has been taking phentermine once daily now and struggles with nighttime cravings. Struggles with meeting exercise goals. +Adequate water intake and sleep.\par \par 4/16/21: Pt gained 3 lbs since our last session, has been off of phentermine x 1 month and feels increased craving. Struggles with cravings for: french fries, pizza, and ice cream at night. Tries to focus on eating healthy throughout the day with salads & tuna fish and fruit throughout the day. + adequate water intake, + adequate sleep. Hasn't been exercising, trying to walk more since the weather is better. Says her height is 5 ft 1 not 5 ft 2. \par \par 5/18/21: Pt gained 5 lbs since our last session, struggling with cravings. Has children who enjoy eating bagels, pizza, cheetos, muffins. Food recall: B- bagel, L-salad with grilled chicken, chickpeas, avacodo, tomotoes, lemon for dressing, D- same salad, Snacks: 2 mangos and corn muffin. Water intake adequate. Denies formal exercising, has been walking outside on occasions and taking care of garden. \par \par 6/11/21: Continues to struggle with evening cravings and gained 2 lbs since our last session. BMI now 26.45. Pt is upset and wants to restart Phentermine to help with cravings. Ate a salad with grilled chicken & tomatoes, felt hungry an hour later and had 2 additional pieces of grilled chicken, felt hungry an hour later and ate popcorn. Trying to eat fruit for a snack most nights. Increased exercise regimen to 4 days per week walking 30 minutes. \par \par 7/8/21: Struggling with cravings and feels that her clothes fit tighter. Hasn't weighed herself today, last weight was 142 lbs. Ate a bagel with cream cheese this morning and snacking often. Walking for exercise 4 days per week x 30 mins. LMP 6/5/21, not currently on birth control. Requests medication to help with cravings.\par \par 9/21/21: Lost 4 lbs since last session. Took phentermine for 1 month and reports better control with food choices- reports eating more salads, grilled chicken tuna salad, turkey burgers, vegetables, and fruit. Walking for exercise 4 days per week. Water intake adequate. Has been off phentermine for 1 week and reports increased cravings at night r/t anxiety/emotional eating. \par \par 1/18/22: Gained 2 lbs since our last session. Continues to struggle with eating unhealthy options (ordered out a work, eating desserts that her children make, social events, and take out with children). Food recall: B- coffee with 2% milk and 1 splenda, cheerios, L- grilled chicken, tomatoes, onions, salad with lemon as dressing, sweet potato, D- salad + [employer] french fries. Water intake- adequate. Denies exercising. Would like to restart phentermine to help with cravings. Had labs done in Oct at PCPs office- plans to have results faxed to our office.

## 2022-01-18 NOTE — ASSESSMENT
[FreeTextEntry1] : Dietary Modification- recommend decreasing intake of simple sugars including food with white sugar and white flour, recommend decreasing intake of friend foods. Educated on how you crave more when you eat processed foods or food high in simple sugars. Recommend dietary intake of lean proteins, vegetables, and whole grains. \par RD referral, needs additional education \par Avoid triggers by bringing lunch to work, less take out, hiding desserts made by children\par Physical Activity- recommend exercising 3-4 times per week (cardio & strength training\par Medication- does not meet criteria to initiate treatment at this point\par Labs- recent labs to be faxed to office for my review\par RTO in 1 month- scheduled a f/u for 2/14 at 8am\par

## 2022-02-14 ENCOUNTER — APPOINTMENT (OUTPATIENT)
Dept: BARIATRICS/WEIGHT MGMT | Facility: CLINIC | Age: 52
End: 2022-02-14
Payer: COMMERCIAL

## 2022-02-14 VITALS — BODY MASS INDEX: 27.38 KG/M2 | HEIGHT: 61 IN | WEIGHT: 145 LBS

## 2022-02-14 PROCEDURE — 99214 OFFICE O/P EST MOD 30 MIN: CPT | Mod: 95

## 2022-02-14 NOTE — HISTORY OF PRESENT ILLNESS
[Home] : at home, [unfilled] , at the time of the visit. [Other Location: e.g. Home (Enter Location, City,State)___] : at [unfilled] [Verbal consent obtained from patient] : the patient, [unfilled] [FreeTextEntry1] : 50 y/o Female here for weight loss. \par Struggling with weight gain since she turned 43 y/o and after having her twin children 10 years ago.\par Highest adult weight 150, Lowest Adult Weight 110\par Goal weight 125\par Currently weighs 140\par Childhood: skinny as a child\par LMP: started 7/6/20, uses condoms and cream for contraception\par Previous weight loss attempts:had liposuction in 2019- lost 10 lbs. Exercised at gym and enjoyed swimming before having children. Denies dieting, but tries to eat healthy (salads, lentil soups, vegetables & fruits). Denies snacking/eating dessert.\par Food recall yesterday- B: coffee, L: Ramen soup, D: 1 slice of homemade pizza\par Exercise: walks 2-3 times per week with a friend for 2 hours. Has access to indoor bicycle- not currently using. \par Water intake: sufficient\par Sleep: 8-9 hours per night \par Occupation: works at [employer] in [facility]- currently working from home\par Motivation: wants to feel happy/comfortable in a bathing suit\par Pt is scheduled to have surgery on 7/27 for breast implant removal with Dr. Sánchez\par \par 9/8/20: Pt unable to connect with [facility] through phone. Telephone discussion done instead. Reports feeling frustrated that she has gained 8 lbs since our last discussion. Has been trying to eat healthy diet, but suffering from cravings/hunger throughout the day. Exercises on indoor bike daily for 30 minutes.Water intake & sleep adequate. \par \par 10/13/20: Pt reports feeling well, taking phentermine 15 mg daily and denies side effects at present. Seen by  this month and denies issues with BP/HR. C/o post surgical breast pain which is limiting her exercise regimen, denies redness/swelling of breast, denies fevers. Continues to focus on eating a healthy diet. Reports sufficient appetite suppression in the morning until about 5 pm when she starts to experience cravings & tries to distract herself with cleaning and going to bed early. Water intake sufficient. Reports sleeping very well. \par \par 10/27/20: Pt seen via [facility]. Reports feeling well & lost 5 lbs since our last session. Has been trying to focus on eating healthy- inc vegetables and decrease carbs. Struggles on the weekend when she goes to the farm and has lasagna & apple pie. Just increased the phentermine dose to 2 pills per day, denies side effects. Breast pain has improved since she started wearing a supportive bra, which enables the pt to exercise again. Has been exercising on her bike for 20 mins 3 x week. Gets adequate water intake and sleep. Wasn't able to get her follow up labwork done before today.\par \par 12/11/20: Pt reports feeling well, down to 130 lbs- feels like she has more energy and fits into her clothes better. Continues to take phentermine, which helps with cravings- notices that she struggles with nighttime cravings if she doesn't take afternoon dose. Tries to focus on healthy snacks when she has cravings. Exercises on indoor bike 2 times per week for 30 minutes and feels that legs are tighter. Reviewed lab work results including: elevated LDL and elevated total cholesterol and low Vitamin D. Pts admitted to enjoying cheese every day as well as eating red meats and high fat dairy products. \par \par 1/8/20: Pt gained 2 lbs since our last session. Pt spoke with [employer] recently & was able to verbalize what she has learned about decreasing dietary fat intake. Stopped eating cheese this week and eats meat once per week. Focuses on eating small portion sizes, fish & salads. Has been taking phentermine once daily now and struggles with nighttime cravings. Struggles with meeting exercise goals. +Adequate water intake and sleep.\par \par 4/16/21: Pt gained 3 lbs since our last session, has been off of phentermine x 1 month and feels increased craving. Struggles with cravings for: french fries, pizza, and ice cream at night. Tries to focus on eating healthy throughout the day with salads & tuna fish and fruit throughout the day. + adequate water intake, + adequate sleep. Hasn't been exercising, trying to walk more since the weather is better. Says her height is 5 ft 1 not 5 ft 2. \par \par 5/18/21: Pt gained 5 lbs since our last session, struggling with cravings. Has children who enjoy eating bagels, pizza, cheetos, muffins. Food recall: B- bagel, L-salad with grilled chicken, chickpeas, avacodo, tomotoes, lemon for dressing, D- same salad, Snacks: 2 mangos and corn muffin. Water intake adequate. Denies formal exercising, has been walking outside on occasions and taking care of garden. \par \par 6/11/21: Continues to struggle with evening cravings and gained 2 lbs since our last session. BMI now 26.45. Pt is upset and wants to restart Phentermine to help with cravings. Ate a salad with grilled chicken & tomatoes, felt hungry an hour later and had 2 additional pieces of grilled chicken, felt hungry an hour later and ate popcorn. Trying to eat fruit for a snack most nights. Increased exercise regimen to 4 days per week walking 30 minutes. \par \par 7/8/21: Struggling with cravings and feels that her clothes fit tighter. Hasn't weighed herself today, last weight was 142 lbs. Ate a bagel with cream cheese this morning and snacking often. Walking for exercise 4 days per week x 30 mins. LMP 6/5/21, not currently on birth control. Requests medication to help with cravings.\par \par 9/21/21: Lost 4 lbs since last session. Took phentermine for 1 month and reports better control with food choices- reports eating more salads, grilled chicken tuna salad, turkey burgers, vegetables, and fruit. Walking for exercise 4 days per week. Water intake adequate. Has been off phentermine for 1 week and reports increased cravings at night r/t anxiety/emotional eating. \par \par 1/18/22: Gained 2 lbs since our last session. Continues to struggle with eating unhealthy options (ordered out a work, eating desserts that her children make, social events, and take out with children). Food recall: B- coffee with 2% milk and 1 splenda, cheerios, L- grilled chicken, tomatoes, onions, salad with lemon as dressing, sweet potato, D- salad + [employer] french fries. Water intake- adequate. Denies exercising. Would like to restart phentermine to help with cravings. Had labs done in Oct at PCPs office- plans to have results faxed to our office.\par \par 2/14/22: Gained 3 lbs. Struggling with cravings- has ice cream, desserts and pizza in the house for her children. Tries to only have 1 bite but ends up eating too much of the unhealthy choices. Food recall- B:cornflakes or cheerios with 2% milk, L-salad with chicken or fish, D- pizza or pasta or tuna sushi rolls, Dessert- had ice cream last night. Water intake- inadequate. Increased exercise routine- uses the stationary bike 4 times per wk x 20 mins. Reviewed lab results from Sep 2021 with pt- Total cholesterol is slightly elevated but improved, LDL cholesterol is normal & better.  Would like to resume phentermine to help with cravings.

## 2022-02-14 NOTE — ASSESSMENT
[FreeTextEntry1] : Dietary Modification- recommend decreasing intake of simple sugars including food with white sugar and white flour, recommend decreasing intake of whole fat dairy products, pizza, pasta, ice cream, etc.  Educated on how you crave more when you eat processed foods or food high in simple sugars. Recommend dietary intake of lean proteins, vegetables, and whole grains. Discussed healthier options for breakfast, vegetable omelette, oatmeal, hard boiled egg. Discussed healthier dessert options- baked apple, halo ice cream or frozen greek yogurt bar. \par Water- recommend increasing to 8 cups of water daily\par Physical Activity- recommend exercising 3-4 times per week (cardio & strength training). Exercise goal- continue cardio on the bike at nighttime to avoid desserts, incorporate strength training.\par Medication- meets criteria to restart on phentermine 15 mg once daily to help with cravings/appetite suppression. Has tolerated well in the past \par RTO in 1 month- scheduled a f/u for 3/15 at 8am\par

## 2022-03-15 ENCOUNTER — APPOINTMENT (OUTPATIENT)
Dept: BARIATRICS/WEIGHT MGMT | Facility: CLINIC | Age: 52
End: 2022-03-15
Payer: COMMERCIAL

## 2022-03-15 VITALS — BODY MASS INDEX: 27.38 KG/M2 | HEIGHT: 61 IN | WEIGHT: 145 LBS

## 2022-03-15 PROCEDURE — 99213 OFFICE O/P EST LOW 20 MIN: CPT | Mod: 95

## 2022-03-15 NOTE — ASSESSMENT
[FreeTextEntry1] : Continue dietary modifications- focus on lean protein, vegetables, and complex carbs. \par Water- recommend increasing to 8 cups of water daily\par Physical Activity- recommend exercising 3-4 times per week (cardio & strength training). Exercise goal- continue walking, incorporate strength training with light weights \par Medication-continue Phentermine 15 mg daily, rx renewal sent \par RTO in 1 month- scheduled a f/u for 4/5 at 8am \par

## 2022-03-15 NOTE — HISTORY OF PRESENT ILLNESS
[Home] : at home, [unfilled] , at the time of the visit. [Other Location: e.g. Home (Enter Location, City,State)___] : at [unfilled] [Verbal consent obtained from patient] : the patient, [unfilled] [FreeTextEntry1] : 52 y/o Female here for weight loss. \par Struggling with weight gain since she turned 41 y/o and after having her twin children 10 years ago.\par Highest adult weight 150, Lowest Adult Weight 110\par Goal weight 125\par Currently weighs 140\par Childhood: skinny as a child\par LMP: started 7/6/20, uses condoms and cream for contraception\par Previous weight loss attempts:had liposuction in 2019- lost 10 lbs. Exercised at gym and enjoyed swimming before having children. Denies dieting, but tries to eat healthy (salads, lentil soups, vegetables & fruits). Denies snacking/eating dessert.\par Food recall yesterday- B: coffee, L: Ramen soup, D: 1 slice of homemade pizza\par Exercise: walks 2-3 times per week with a friend for 2 hours. Has access to indoor bicycle- not currently using. \par Water intake: sufficient\par Sleep: 8-9 hours per night \par Occupation: works at [employer] in [facility]- currently working from home\par Motivation: wants to feel happy/comfortable in a bathing suit\par Pt is scheduled to have surgery on 7/27 for breast implant removal with Dr. Sánchez\par \par 9/8/20: Pt unable to connect with [employer] through phone. Telephone discussion done instead. Reports feeling frustrated that she has gained 8 lbs since our last discussion. Has been trying to eat healthy diet, but suffering from cravings/hunger throughout the day. Exercises on indoor bike daily for 30 minutes.Water intake & sleep adequate. \par \par 10/13/20: Pt reports feeling well, taking phentermine 15 mg daily and denies side effects at present. Seen by  this month and denies issues with BP/HR. C/o post surgical breast pain which is limiting her exercise regimen, denies redness/swelling of breast, denies fevers. Continues to focus on eating a healthy diet. Reports sufficient appetite suppression in the morning until about 5 pm when she starts to experience cravings & tries to distract herself with cleaning and going to bed early. Water intake sufficient. Reports sleeping very well. \par \par 10/27/20: Pt seen via [facility]. Reports feeling well & lost 5 lbs since our last session. Has been trying to focus on eating healthy- inc vegetables and decrease carbs. Struggles on the weekend when she goes to the farm and has lasagna & apple pie. Just increased the phentermine dose to 2 pills per day, denies side effects. Breast pain has improved since she started wearing a supportive bra, which enables the pt to exercise again. Has been exercising on her bike for 20 mins 3 x week. Gets adequate water intake and sleep. Wasn't able to get her follow up labwork done before today.\par \par 12/11/20: Pt reports feeling well, down to 130 lbs- feels like she has more energy and fits into her clothes better. Continues to take phentermine, which helps with cravings- notices that she struggles with nighttime cravings if she doesn't take afternoon dose. Tries to focus on healthy snacks when she has cravings. Exercises on indoor bike 2 times per week for 30 minutes and feels that legs are tighter. Reviewed lab work results including: elevated LDL and elevated total cholesterol and low Vitamin D. Pts admitted to enjoying cheese every day as well as eating red meats and high fat dairy products. \par \par 1/8/20: Pt gained 2 lbs since our last session. Pt spoke with [employer] recently & was able to verbalize what she has learned about decreasing dietary fat intake. Stopped eating cheese this week and eats meat once per week. Focuses on eating small portion sizes, fish & salads. Has been taking phentermine once daily now and struggles with nighttime cravings. Struggles with meeting exercise goals. +Adequate water intake and sleep.\par \par 4/16/21: Pt gained 3 lbs since our last session, has been off of phentermine x 1 month and feels increased craving. Struggles with cravings for: french fries, pizza, and ice cream at night. Tries to focus on eating healthy throughout the day with salads & tuna fish and fruit throughout the day. + adequate water intake, + adequate sleep. Hasn't been exercising, trying to walk more since the weather is better. Says her height is 5 ft 1 not 5 ft 2. \par \par 5/18/21: Pt gained 5 lbs since our last session, struggling with cravings. Has children who enjoy eating bagels, pizza, cheetos, muffins. Food recall: B- bagel, L-salad with grilled chicken, chickpeas, avacodo, tomotoes, lemon for dressing, D- same salad, Snacks: 2 mangos and corn muffin. Water intake adequate. Denies formal exercising, has been walking outside on occasions and taking care of garden. \par \par 6/11/21: Continues to struggle with evening cravings and gained 2 lbs since our last session. BMI now 26.45. Pt is upset and wants to restart Phentermine to help with cravings. Ate a salad with grilled chicken & tomatoes, felt hungry an hour later and had 2 additional pieces of grilled chicken, felt hungry an hour later and ate popcorn. Trying to eat fruit for a snack most nights. Increased exercise regimen to 4 days per week walking 30 minutes. \par \par 7/8/21: Struggling with cravings and feels that her clothes fit tighter. Hasn't weighed herself today, last weight was 142 lbs. Ate a bagel with cream cheese this morning and snacking often. Walking for exercise 4 days per week x 30 mins. LMP 6/5/21, not currently on birth control. Requests medication to help with cravings.\par \par 9/21/21: Lost 4 lbs since last session. Took phentermine for 1 month and reports better control with food choices- reports eating more salads, grilled chicken tuna salad, turkey burgers, vegetables, and fruit. Walking for exercise 4 days per week. Water intake adequate. Has been off phentermine for 1 week and reports increased cravings at night r/t anxiety/emotional eating. \par \par 1/18/22: Gained 2 lbs since our last session. Continues to struggle with eating unhealthy options (ordered out a work, eating desserts that her children make, social events, and take out with children). Food recall: B- coffee with 2% milk and 1 splenda, cheerios, L- grilled chicken, tomatoes, onions, salad with lemon as dressing, sweet potato, D- salad + [employer] french fries. Water intake- adequate. Denies exercising. Would like to restart phentermine to help with cravings. Had labs done in Oct at PCPs office- plans to have results faxed to our office.\par \par 2/14/22: Gained 3 lbs. Struggling with cravings- has ice cream, desserts and pizza in the house for her children. Tries to only have 1 bite but ends up eating too much of the unhealthy choices. Food recall- B:cornflakes or cheerios with 2% milk, L-salad with chicken or fish, D- pizza or pasta or tuna sushi rolls, Dessert- had ice cream last night. Water intake- inadequate. Increased exercise routine- uses the stationary bike 4 times per wk x 20 mins. Reviewed lab results from Sep 2021 with pt- Total cholesterol is slightly elevated but improved, LDL cholesterol is normal & better.  Would like to resume phentermine to help with cravings. \par \par 3/15/22: Maintained weight, feels more energy, walking 2 times per day for exercise. Tolerating phentermine, denies side effects and reports good appetite suppression with dec cravings. Eating better overall- making chickpea pancakes. Water intake improved.

## 2022-04-05 ENCOUNTER — APPOINTMENT (OUTPATIENT)
Dept: BARIATRICS/WEIGHT MGMT | Facility: CLINIC | Age: 52
End: 2022-04-05
Payer: COMMERCIAL

## 2022-04-05 VITALS — WEIGHT: 143 LBS | BODY MASS INDEX: 27 KG/M2 | HEIGHT: 61 IN

## 2022-04-05 PROCEDURE — 99213 OFFICE O/P EST LOW 20 MIN: CPT | Mod: 95

## 2022-04-05 NOTE — HISTORY OF PRESENT ILLNESS
[Other Location: e.g. School (Enter Location, City,State)___] : at [unfilled], at the time of the visit. [Medical Office: ([facility])___] : at the medical office located in  [Verbal consent obtained from patient] : the patient, [unfilled] [FreeTextEntry1] : 50 y/o Female here for weight loss. \par Struggling with weight gain since she turned 41 y/o and after having her twin children 10 years ago.\par Highest adult weight 150, Lowest Adult Weight 110\par Goal weight 125\par Currently weighs 140\par Childhood: skinny as a child\par LMP: started 7/6/20, uses condoms and cream for contraception\par Previous weight loss attempts:had liposuction in 2019- lost 10 lbs. Exercised at gym and enjoyed swimming before having children. Denies dieting, but tries to eat healthy (salads, lentil soups, vegetables & fruits). Denies snacking/eating dessert.\par Food recall yesterday- B: coffee, L: Ramen soup, D: 1 slice of homemade pizza\par Exercise: walks 2-3 times per week with a friend for 2 hours. Has access to indoor bicycle- not currently using. \par Water intake: sufficient\par Sleep: 8-9 hours per night \par Occupation: works at [employer] in [employer] [employer]- currently working from home\par Motivation: wants to feel happy/comfortable in a bathing suit\par Pt is scheduled to have surgery on 7/27 for breast implant removal with Dr. Sánchez\par \par 9/8/20: Pt unable to connect with [facility] through phone. Telephone discussion done instead. Reports feeling frustrated that she has gained 8 lbs since our last discussion. Has been trying to eat healthy diet, but suffering from cravings/hunger throughout the day. Exercises on indoor bike daily for 30 minutes.Water intake & sleep adequate. \par \par 10/13/20: Pt reports feeling well, taking phentermine 15 mg daily and denies side effects at present. Seen by  this month and denies issues with BP/HR. C/o post surgical breast pain which is limiting her exercise regimen, denies redness/swelling of breast, denies fevers. Continues to focus on eating a healthy diet. Reports sufficient appetite suppression in the morning until about 5 pm when she starts to experience cravings & tries to distract herself with cleaning and going to bed early. Water intake sufficient. Reports sleeping very well. \par \par 10/27/20: Pt seen via [facility]. Reports feeling well & lost 5 lbs since our last session. Has been trying to focus on eating healthy- inc vegetables and decrease carbs. Struggles on the weekend when she goes to the farm and has lasagna & apple pie. Just increased the phentermine dose to 2 pills per day, denies side effects. Breast pain has improved since she started wearing a supportive bra, which enables the pt to exercise again. Has been exercising on her bike for 20 mins 3 x week. Gets adequate water intake and sleep. Wasn't able to get her follow up labwork done before today.\par \par 12/11/20: Pt reports feeling well, down to 130 lbs- feels like she has more energy and fits into her clothes better. Continues to take phentermine, which helps with cravings- notices that she struggles with nighttime cravings if she doesn't take afternoon dose. Tries to focus on healthy snacks when she has cravings. Exercises on indoor bike 2 times per week for 30 minutes and feels that legs are tighter. Reviewed lab work results including: elevated LDL and elevated total cholesterol and low Vitamin D. Pts admitted to enjoying cheese every day as well as eating red meats and high fat dairy products. \par \par 1/8/20: Pt gained 2 lbs since our last session. Pt spoke with [employer] [place] recently & was able to verbalize what she has learned about decreasing dietary fat intake. Stopped eating cheese this week and eats meat once per week. Focuses on eating small portion sizes, fish & salads. Has been taking phentermine once daily now and struggles with nighttime cravings. Struggles with meeting exercise goals. +Adequate water intake and sleep.\par \par 4/16/21: Pt gained 3 lbs since our last session, has been off of phentermine x 1 month and feels increased craving. Struggles with cravings for: french fries, pizza, and ice cream at night. Tries to focus on eating healthy throughout the day with salads & tuna fish and fruit throughout the day. + adequate water intake, + adequate sleep. Hasn't been exercising, trying to walk more since the weather is better. Says her height is 5 ft 1 not 5 ft 2. \par \par 5/18/21: Pt gained 5 lbs since our last session, struggling with cravings. Has children who enjoy eating bagels, pizza, cheetos, muffins. Food recall: B- bagel, L-salad with grilled chicken, chickpeas, avacodo, tomotoes, lemon for dressing, D- same salad, Snacks: 2 mangos and corn muffin. Water intake adequate. Denies formal exercising, has been walking outside on occasions and taking care of garden. \par \par 6/11/21: Continues to struggle with evening cravings and gained 2 lbs since our last session. BMI now 26.45. Pt is upset and wants to restart Phentermine to help with cravings. Ate a salad with grilled chicken & tomatoes, felt hungry an hour later and had 2 additional pieces of grilled chicken, felt hungry an hour later and ate popcorn. Trying to eat fruit for a snack most nights. Increased exercise regimen to 4 days per week walking 30 minutes. \par \par 7/8/21: Struggling with cravings and feels that her clothes fit tighter. Hasn't weighed herself today, last weight was 142 lbs. Ate a bagel with cream cheese this morning and snacking often. Walking for exercise 4 days per week x 30 mins. LMP 6/5/21, not currently on birth control. Requests medication to help with cravings.\par \par 9/21/21: Lost 4 lbs since last session. Took phentermine for 1 month and reports better control with food choices- reports eating more salads, grilled chicken tuna salad, turkey burgers, vegetables, and fruit. Walking for exercise 4 days per week. Water intake adequate. Has been off phentermine for 1 week and reports increased cravings at night r/t anxiety/emotional eating. \par \par 1/18/22: Gained 2 lbs since our last session. Continues to struggle with eating unhealthy options (ordered out a work, eating desserts that her children make, social events, and take out with children). Food recall: B- coffee with 2% milk and 1 splenda, cheerios, L- grilled chicken, tomatoes, onions, salad with lemon as dressing, sweet potato, D- salad + [employer] french fries. Water intake- adequate. Denies exercising. Would like to restart phentermine to help with cravings. Had labs done in Oct at PCPs office- plans to have results faxed to our office.\par \par 2/14/22: Gained 3 lbs. Struggling with cravings- has ice cream, desserts and pizza in the house for her children. Tries to only have 1 bite but ends up eating too much of the unhealthy choices. Food recall- B:cornflakes or cheerios with 2% milk, L-salad with chicken or fish, D- pizza or pasta or tuna sushi rolls, Dessert- had ice cream last night. Water intake- inadequate. Increased exercise routine- uses the stationary bike 4 times per wk x 20 mins. Reviewed lab results from Sep 2021 with pt- Total cholesterol is slightly elevated but improved, LDL cholesterol is normal & better.  Would like to resume phentermine to help with cravings. \par \par 3/15/22: Maintained weight, feels more energy, walking 2 times per day for exercise. Tolerating phentermine, denies side effects and reports good appetite suppression with dec cravings. Eating better overall- making chickpea pancakes. Water intake improved. \par \par 4/4/22: Lost 2 lbs. Reports feeling well. Taking phentermine 15 mg daily, denies side effects, still feels hungry throughout the day. Water intake-adequate. Exercising on stationary bike for 1 hour 4-5 times per week. Eating well- has fruit & avocado available at work for snacks.

## 2022-04-05 NOTE — ASSESSMENT
[FreeTextEntry1] : Continue current dietary changes, recommend eating more lean protein throughout the day to help her feel full \par Continue cardio exercise routine, rec incorporating strength training with light weights and resistance bands\par Medication- inc dose of phentermine to 30 mg daily \par Labs- due for f/u lab, to be done at [facility]\par RTO in 1 month- scheduled a f/u [facility] for 5/9 at 8am

## 2022-05-09 ENCOUNTER — APPOINTMENT (OUTPATIENT)
Dept: BARIATRICS/WEIGHT MGMT | Facility: CLINIC | Age: 52
End: 2022-05-09
Payer: COMMERCIAL

## 2022-05-09 PROCEDURE — 99214 OFFICE O/P EST MOD 30 MIN: CPT | Mod: 95

## 2022-05-09 NOTE — HISTORY OF PRESENT ILLNESS
[Other Location: e.g. School (Enter Location, City,State)___] : at [unfilled], at the time of the visit. [Medical Office: ([facility])___] : at the medical office located in  [Verbal consent obtained from patient] : the patient, [unfilled] [FreeTextEntry1] : 50 y/o Female here for weight loss. \par Struggling with weight gain since she turned 43 y/o and after having her twin children 10 years ago.\par Highest adult weight 150, Lowest Adult Weight 110\par Goal weight 125\par Currently weighs 140\par Childhood: skinny as a child\par LMP: started 7/6/20, uses condoms and cream for contraception\par Previous weight loss attempts:had liposuction in 2019- lost 10 lbs. Exercised at gym and enjoyed swimming before having children. Denies dieting, but tries to eat healthy (salads, lentil soups, vegetables & fruits). Denies snacking/eating dessert.\par Food recall yesterday- B: coffee, L: Ramen soup, D: 1 slice of homemade pizza\par Exercise: walks 2-3 times per week with a friend for 2 hours. Has access to indoor bicycle- not currently using. \par Water intake: sufficient\par Sleep: 8-9 hours per night \par Occupation: works at [employer] in [facility] [facility] [employer]- currently working from home\par Motivation: wants to feel happy/comfortable in a bathing suit\par Pt is scheduled to have surgery on 7/27 for breast implant removal with Dr. Sánchez\par \par 9/8/20: Pt unable to connect with [facility] through phone. Telephone discussion done instead. Reports feeling frustrated that she has gained 8 lbs since our last discussion. Has been trying to eat healthy diet, but suffering from cravings/hunger throughout the day. Exercises on indoor bike daily for 30 minutes.Water intake & sleep adequate. \par \par 10/13/20: Pt reports feeling well, taking phentermine 15 mg daily and denies side effects at present. Seen by  this month and denies issues with BP/HR. C/o post surgical breast pain which is limiting her exercise regimen, denies redness/swelling of breast, denies fevers. Continues to focus on eating a healthy diet. Reports sufficient appetite suppression in the morning until about 5 pm when she starts to experience cravings & tries to distract herself with cleaning and going to bed early. Water intake sufficient. Reports sleeping very well. \par \par 10/27/20: Pt seen via [facility]. Reports feeling well & lost 5 lbs since our last session. Has been trying to focus on eating healthy- inc vegetables and decrease carbs. Struggles on the weekend when she goes to the farm and has lasagna & apple pie. Just increased the phentermine dose to 2 pills per day, denies side effects. Breast pain has improved since she started wearing a supportive bra, which enables the pt to exercise again. Has been exercising on her bike for 20 mins 3 x week. Gets adequate water intake and sleep. Wasn't able to get her follow up labwork done before today.\par \par 12/11/20: Pt reports feeling well, down to 130 lbs- feels like she has more energy and fits into her clothes better. Continues to take phentermine, which helps with cravings- notices that she struggles with nighttime cravings if she doesn't take afternoon dose. Tries to focus on healthy snacks when she has cravings. Exercises on indoor bike 2 times per week for 30 minutes and feels that legs are tighter. Reviewed lab work results including: elevated LDL and elevated total cholesterol and low Vitamin D. Pts admitted to enjoying cheese every day as well as eating red meats and high fat dairy products. \par \par 1/8/20: Pt gained 2 lbs since our last session. Pt spoke with [employer] recently & was able to verbalize what she has learned about decreasing dietary fat intake. Stopped eating cheese this week and eats meat once per week. Focuses on eating small portion sizes, fish & salads. Has been taking phentermine once daily now and struggles with nighttime cravings. Struggles with meeting exercise goals. +Adequate water intake and sleep.\par \par 4/16/21: Pt gained 3 lbs since our last session, has been off of phentermine x 1 month and feels increased craving. Struggles with cravings for: french fries, pizza, and ice cream at night. Tries to focus on eating healthy throughout the day with salads & tuna fish and fruit throughout the day. + adequate water intake, + adequate sleep. Hasn't been exercising, trying to walk more since the weather is better. Says her height is 5 ft 1 not 5 ft 2. \par \par 5/18/21: Pt gained 5 lbs since our last session, struggling with cravings. Has children who enjoy eating bagels, pizza, cheetos, muffins. Food recall: B- bagel, L-salad with grilled chicken, chickpeas, avacodo, tomotoes, lemon for dressing, D- same salad, Snacks: 2 mangos and corn muffin. Water intake adequate. Denies formal exercising, has been walking outside on occasions and taking care of garden. \par \par 6/11/21: Continues to struggle with evening cravings and gained 2 lbs since our last session. BMI now 26.45. Pt is upset and wants to restart Phentermine to help with cravings. Ate a salad with grilled chicken & tomatoes, felt hungry an hour later and had 2 additional pieces of grilled chicken, felt hungry an hour later and ate popcorn. Trying to eat fruit for a snack most nights. Increased exercise regimen to 4 days per week walking 30 minutes. \par \par 7/8/21: Struggling with cravings and feels that her clothes fit tighter. Hasn't weighed herself today, last weight was 142 lbs. Ate a bagel with cream cheese this morning and snacking often. Walking for exercise 4 days per week x 30 mins. LMP 6/5/21, not currently on birth control. Requests medication to help with cravings.\par \par 9/21/21: Lost 4 lbs since last session. Took phentermine for 1 month and reports better control with food choices- reports eating more salads, grilled chicken tuna salad, turkey burgers, vegetables, and fruit. Walking for exercise 4 days per week. Water intake adequate. Has been off phentermine for 1 week and reports increased cravings at night r/t anxiety/emotional eating. \par \par 1/18/22: Gained 2 lbs since our last session. Continues to struggle with eating unhealthy options (ordered out a work, eating desserts that her children make, social events, and take out with children). Food recall: B- coffee with 2% milk and 1 splenda, cheerios, L- grilled chicken, tomatoes, onions, salad with lemon as dressing, sweet potato, D- salad + [employer] french fries. Water intake- adequate. Denies exercising. Would like to restart phentermine to help with cravings. Had labs done in Oct at [facility] office- plans to have results faxed to our office.\par \par 2/14/22: Gained 3 lbs. Struggling with cravings- has ice cream, desserts and pizza in the house for her children. Tries to only have 1 bite but ends up eating too much of the unhealthy choices. Food recall- B:cornflakes or cheerios with 2% milk, L-salad with chicken or fish, D- pizza or pasta or tuna sushi rolls, Dessert- had ice cream last night. Water intake- inadequate. Increased exercise routine- uses the stationary bike 4 times per wk x 20 mins. Reviewed lab results from Sep 2021 with pt- Total cholesterol is slightly elevated but improved, LDL cholesterol is normal & better.  Would like to resume phentermine to help with cravings. \par \par 3/15/22: Maintained weight, feels more energy, walking 2 times per day for exercise. Tolerating phentermine, denies side effects and reports good appetite suppression with dec cravings. Eating better overall- making chickpea pancakes. Water intake improved. \par \par 4/4/22: Lost 2 lbs. Reports feeling well. Taking phentermine 15 mg daily, denies side effects, still feels hungry throughout the day. Water intake-adequate. Exercising on stationary bike for 1 hour 4-5 times per week. Eating well- has fruit & avocado available at work for snacks. \par \par 5/9/22: Hasn't weighed herself recently, feels the same size. Tolerating phentermine 30 mg daily, denies side effects. Reports sleeping well, good appetite suppression until 3 pm, then she gets cravings. Tries to eat healthy options throughout the day but struggles when she sees trigger foods available at work. Water intake adeqaute, Walking 3-4 times per week for exercise.

## 2022-05-09 NOTE — ASSESSMENT
[FreeTextEntry1] : Plans to weigh herself later today to report progress. \par Dietary Modification- try to avoid going into the lunch room to avoid seeing triggers, continue to bring healthy snacks to work to avoid eating available foods\par Water- recommend increasing to 8 cups of water daily\par Physical Activity- recommend exercising 3-4 times per week (cardio & strength training). Exercise goal- continue cardio with walking or using bike, incorporate strength training. \par Medication- discussed changing to saxenda instead if she hasn't lost any additional weight. Can continue to take phentermine 30 mg x1 month if she started to see weight loss progress. \par RTO in 1 month- scheduled a f/u for 6/13 at 8:30am\par

## 2022-07-05 LAB
25(OH)D3 SERPL-MCNC: 26.3 NG/ML
ALBUMIN SERPL ELPH-MCNC: 4.3 G/DL
ALP BLD-CCNC: 77 U/L
ALT SERPL-CCNC: 30 U/L
ANION GAP SERPL CALC-SCNC: 10 MMOL/L
AST SERPL-CCNC: 27 U/L
BASOPHILS # BLD AUTO: 0.03 K/UL
BASOPHILS NFR BLD AUTO: 0.5 %
BILIRUB SERPL-MCNC: 0.5 MG/DL
BUN SERPL-MCNC: 16 MG/DL
CALCIUM SERPL-MCNC: 9.1 MG/DL
CHLORIDE SERPL-SCNC: 105 MMOL/L
CHOLEST SERPL-MCNC: 243 MG/DL
CO2 SERPL-SCNC: 26 MMOL/L
CREAT SERPL-MCNC: 0.47 MG/DL
EGFR: 116 ML/MIN/1.73M2
EOSINOPHIL # BLD AUTO: 0.15 K/UL
EOSINOPHIL NFR BLD AUTO: 2.3 %
ESTIMATED AVERAGE GLUCOSE: 111 MG/DL
GLUCOSE SERPL-MCNC: 85 MG/DL
HBA1C MFR BLD HPLC: 5.5 %
HCT VFR BLD CALC: 39.4 %
HDLC SERPL-MCNC: 65 MG/DL
HGB BLD-MCNC: 12.5 G/DL
IMM GRANULOCYTES NFR BLD AUTO: 0.3 %
LDLC SERPL CALC-MCNC: 158 MG/DL
LYMPHOCYTES # BLD AUTO: 1.8 K/UL
LYMPHOCYTES NFR BLD AUTO: 27.6 %
MAN DIFF?: NORMAL
MCHC RBC-ENTMCNC: 28.5 PG
MCHC RBC-ENTMCNC: 31.7 GM/DL
MCV RBC AUTO: 89.7 FL
MONOCYTES # BLD AUTO: 0.48 K/UL
MONOCYTES NFR BLD AUTO: 7.4 %
NEUTROPHILS # BLD AUTO: 4.05 K/UL
NEUTROPHILS NFR BLD AUTO: 61.9 %
NONHDLC SERPL-MCNC: 178 MG/DL
PLATELET # BLD AUTO: 310 K/UL
POTASSIUM SERPL-SCNC: 4 MMOL/L
PROT SERPL-MCNC: 6.6 G/DL
RBC # BLD: 4.39 M/UL
RBC # FLD: 13.6 %
SODIUM SERPL-SCNC: 141 MMOL/L
TRIGL SERPL-MCNC: 100 MG/DL
TSH SERPL-ACNC: 1.7 UIU/ML
WBC # FLD AUTO: 6.53 K/UL

## 2022-07-21 ENCOUNTER — APPOINTMENT (OUTPATIENT)
Dept: BARIATRICS/WEIGHT MGMT | Facility: CLINIC | Age: 52
End: 2022-07-21

## 2022-07-21 VITALS — WEIGHT: 142 LBS | HEIGHT: 61 IN | BODY MASS INDEX: 26.81 KG/M2

## 2022-07-21 PROCEDURE — 99214 OFFICE O/P EST MOD 30 MIN: CPT | Mod: 95

## 2022-07-21 NOTE — ASSESSMENT
[FreeTextEntry1] : Recommend dietary modification. Reduce intake of bad fat including-fried foods, full fat dairy products, red meat, meat with skin on it, processed fats/ snacks. Recommend eating foods with healthy fat including avocado, fish, omega-3 fatty acids, nuts, lean protein. Recommend avoiding simple sugars and instead eating complex carbohydrates/ whole grains. Recommend increasing your physical activity. \par Discussed decreasing intake of cheese, red meat, pizza, and ice cream. Change to avocado oil for cooking and increasing exercise- try to increase activity on the weekends\par Med- can continue to take phentermine 15 mg 1-2 tabs daily \par RTO in 1 month- 8/22 at 8am

## 2022-07-21 NOTE — HISTORY OF PRESENT ILLNESS
[Other Location: e.g. School (Enter Location, City,State)___] : at [unfilled], at the time of the visit. [Verbal consent obtained from patient] : the patient, [unfilled] [Other Location: e.g. Home (Enter Location, City,State)___] : at [unfilled] [FreeTextEntry1] : 50 y/o Female here for weight loss. \par Struggling with weight gain since she turned 43 y/o and after having her twin children 10 years ago.\par Highest adult weight 150, Lowest Adult Weight 110\par Goal weight 125\par Currently weighs 140\par Childhood: skinny as a child\par LMP: started 7/6/20, uses condoms and cream for contraception\par Previous weight loss attempts:had liposuction in 2019- lost 10 lbs. Exercised at gym and enjoyed swimming before having children. Denies dieting, but tries to eat healthy (salads, lentil soups, vegetables & fruits). Denies snacking/eating dessert.\par Food recall yesterday- B: coffee, L: Ramen soup, D: 1 slice of homemade pizza\par Exercise: walks 2-3 times per week with a friend for 2 hours. Has access to indoor bicycle- not currently using. \par Water intake: sufficient\par Sleep: 8-9 hours per night \par Occupation: works at [employer] in [facility]- currently working from home\par Motivation: wants to feel happy/comfortable in a bathing suit\par Pt is scheduled to have surgery on 7/27 for breast implant removal with Dr. Sánchez\par \par 9/8/20: Pt unable to connect with [employer] through phone. Telephone discussion done instead. Reports feeling frustrated that she has gained 8 lbs since our last discussion. Has been trying to eat healthy diet, but suffering from cravings/hunger throughout the day. Exercises on indoor bike daily for 30 minutes.Water intake & sleep adequate. \par \par 10/13/20: Pt reports feeling well, taking phentermine 15 mg daily and denies side effects at present. Seen by  this month and denies issues with BP/HR. C/o post surgical breast pain which is limiting her exercise regimen, denies redness/swelling of breast, denies fevers. Continues to focus on eating a healthy diet. Reports sufficient appetite suppression in the morning until about 5 pm when she starts to experience cravings & tries to distract herself with cleaning and going to bed early. Water intake sufficient. Reports sleeping very well. \par \par 10/27/20: Pt seen via [facility]. Reports feeling well & lost 5 lbs since our last session. Has been trying to focus on eating healthy- inc vegetables and decrease carbs. Struggles on the weekend when she goes to the farm and has lasagna & apple pie. Just increased the phentermine dose to 2 pills per day, denies side effects. Breast pain has improved since she started wearing a supportive bra, which enables the pt to exercise again. Has been exercising on her bike for 20 mins 3 x week. Gets adequate water intake and sleep. Wasn't able to get her follow up labwork done before today.\par \par 12/11/20: Pt reports feeling well, down to 130 lbs- feels like she has more energy and fits into her clothes better. Continues to take phentermine, which helps with cravings- notices that she struggles with nighttime cravings if she doesn't take afternoon dose. Tries to focus on healthy snacks when she has cravings. Exercises on indoor bike 2 times per week for 30 minutes and feels that legs are tighter. Reviewed lab work results including: elevated LDL and elevated total cholesterol and low Vitamin D. Pts admitted to enjoying cheese every day as well as eating red meats and high fat dairy products. \par \par 1/8/20: Pt gained 2 lbs since our last session. Pt spoke with [facility] recently & was able to verbalize what she has learned about decreasing dietary fat intake. Stopped eating cheese this week and eats meat once per week. Focuses on eating small portion sizes, fish & salads. Has been taking phentermine once daily now and struggles with nighttime cravings. Struggles with meeting exercise goals. +Adequate water intake and sleep.\par \par 4/16/21: Pt gained 3 lbs since our last session, has been off of phentermine x 1 month and feels increased craving. Struggles with cravings for: french fries, pizza, and ice cream at night. Tries to focus on eating healthy throughout the day with salads & tuna fish and fruit throughout the day. + adequate water intake, + adequate sleep. Hasn't been exercising, trying to walk more since the weather is better. Says her height is 5 ft 1 not 5 ft 2. \par \par 5/18/21: Pt gained 5 lbs since our last session, struggling with cravings. Has children who enjoy eating bagels, pizza, cheetos, muffins. Food recall: B- bagel, L-salad with grilled chicken, chickpeas, avacodo, tomotoes, lemon for dressing, D- same salad, Snacks: 2 mangos and corn muffin. Water intake adequate. Denies formal exercising, has been walking outside on occasions and taking care of garden. \par \par 6/11/21: Continues to struggle with evening cravings and gained 2 lbs since our last session. BMI now 26.45. Pt is upset and wants to restart Phentermine to help with cravings. Ate a salad with grilled chicken & tomatoes, felt hungry an hour later and had 2 additional pieces of grilled chicken, felt hungry an hour later and ate popcorn. Trying to eat fruit for a snack most nights. Increased exercise regimen to 4 days per week walking 30 minutes. \par \par 7/8/21: Struggling with cravings and feels that her clothes fit tighter. Hasn't weighed herself today, last weight was 142 lbs. Ate a bagel with cream cheese this morning and snacking often. Walking for exercise 4 days per week x 30 mins. LMP 6/5/21, not currently on birth control. Requests medication to help with cravings.\par \par 9/21/21: Lost 4 lbs since last session. Took phentermine for 1 month and reports better control with food choices- reports eating more salads, grilled chicken tuna salad, turkey burgers, vegetables, and fruit. Walking for exercise 4 days per week. Water intake adequate. Has been off phentermine for 1 week and reports increased cravings at night r/t anxiety/emotional eating. \par \par 1/18/22: Gained 2 lbs since our last session. Continues to struggle with eating unhealthy options (ordered out a work, eating desserts that her children make, social events, and take out with children). Food recall: B- coffee with 2% milk and 1 splenda, cheerios, L- grilled chicken, tomatoes, onions, salad with lemon as dressing, sweet potato, D- salad + [employer] french fries. Water intake- adequate. Denies exercising. Would like to restart phentermine to help with cravings. Had labs done in Oct at PCPs office- plans to have results faxed to our office.\par \par 2/14/22: Gained 3 lbs. Struggling with cravings- has ice cream, desserts and pizza in the house for her children. Tries to only have 1 bite but ends up eating too much of the unhealthy choices. Food recall- B:cornflakes or cheerios with 2% milk, L-salad with chicken or fish, D- pizza or pasta or tuna sushi rolls, Dessert- had ice cream last night. Water intake- inadequate. Increased exercise routine- uses the stationary bike 4 times per wk x 20 mins. Reviewed lab results from Sep 2021 with pt- Total cholesterol is slightly elevated but improved, LDL cholesterol is normal & better.  Would like to resume phentermine to help with cravings. \par \par 3/15/22: Maintained weight, feels more energy, walking 2 times per day for exercise. Tolerating phentermine, denies side effects and reports good appetite suppression with dec cravings. Eating better overall- making chickpea pancakes. Water intake improved. \par \par 4/4/22: Lost 2 lbs. Reports feeling well. Taking phentermine 15 mg daily, denies side effects, still feels hungry throughout the day. Water intake-adequate. Exercising on stationary bike for 1 hour 4-5 times per week. Eating well- has fruit & avocado available at work for snacks. \par \par 5/9/22: Hasn't weighed herself recently, feels the same size. Tolerating phentermine 30 mg daily, denies side effects. Reports sleeping well, good appetite suppression until 3 pm, then she gets cravings. Tries to eat healthy options throughout the day but struggles when she sees trigger foods available at work. Water intake adeqaute, Walking 3-4 times per week for exercise. \par \par 7/21/22: Maintained weight, Has been off of phenetermine x 1 month and struggles with cravings. Continues to eat well for the most part- B: oatmeal, L- quinoa and spinach, D- zucchini & shrimp. Eats red meat 2 x per week, portugese rolls on weekend, ice cream for dessert. Denies exercising for last 2 months. Reviewed lab results- Cholestrol elevated, LDL elevated, 10 year ASCVD risk 1 %

## 2022-08-08 RX ORDER — PHENTERMINE HYDROCHLORIDE 15 MG/1
15 CAPSULE ORAL
Qty: 60 | Refills: 0 | Status: DISCONTINUED | COMMUNITY
Start: 2022-07-21 | End: 2022-08-08

## 2022-08-22 ENCOUNTER — APPOINTMENT (OUTPATIENT)
Dept: BARIATRICS/WEIGHT MGMT | Facility: CLINIC | Age: 52
End: 2022-08-22
Payer: COMMERCIAL

## 2022-08-22 VITALS — WEIGHT: 141 LBS | BODY MASS INDEX: 26.62 KG/M2 | HEIGHT: 61 IN

## 2022-08-22 PROCEDURE — 99213 OFFICE O/P EST LOW 20 MIN: CPT | Mod: 95

## 2022-08-22 RX ORDER — PHENTERMINE HYDROCHLORIDE 15 MG/1
15 CAPSULE ORAL
Qty: 30 | Refills: 0 | Status: COMPLETED | COMMUNITY
Start: 2022-02-14 | End: 2022-08-22

## 2022-08-22 NOTE — ASSESSMENT
[FreeTextEntry1] : Recommend dietary modification. Reduce intake of bad fat including-fried foods, full fat dairy products, red meat, meat with skin on it, cheese, processed fats/ snacks. Recommend eating foods with healthy fat including avocado, fish, omega-3 fatty acids, nuts, lean protein. Recommend avoiding simple sugars and instead eating complex carbohydrates/ whole grains. \par Continue to exercise daily\par Meds- rx renewal sent\par RTO 1month- 9/21 at 10:30\par

## 2022-08-22 NOTE — HISTORY OF PRESENT ILLNESS
[Home] : at home, [unfilled] , at the time of the visit. [Other Location: e.g. Home (Enter Location, City,State)___] : at [unfilled] [Verbal consent obtained from patient] : the patient, [unfilled] [FreeTextEntry1] : 50 y/o Female here for weight loss. \par Struggling with weight gain since she turned 41 y/o and after having her twin children 10 years ago.\par Highest adult weight 150, Lowest Adult Weight 110\par Goal weight 125\par Currently weighs 140\par Childhood: skinny as a child\par LMP: started 7/6/20, uses condoms and cream for contraception\par Previous weight loss attempts:had liposuction in 2019- lost 10 lbs. Exercised at gym and enjoyed swimming before having children. Denies dieting, but tries to eat healthy (salads, lentil soups, vegetables & fruits). Denies snacking/eating dessert.\par Food recall yesterday- B: coffee, L: Ramen soup, D: 1 slice of homemade pizza\par Exercise: walks 2-3 times per week with a friend for 2 hours. Has access to indoor bicycle- not currently using. \par Water intake: sufficient\par Sleep: 8-9 hours per night \par Occupation: works at [employer] in [facility]- currently working from home\par Motivation: wants to feel happy/comfortable in a bathing suit\par Pt is scheduled to have surgery on 7/27 for breast implant removal with Dr. Sánchez\par \par 9/8/20: Pt unable to connect with [employer] through phone. Telephone discussion done instead. Reports feeling frustrated that she has gained 8 lbs since our last discussion. Has been trying to eat healthy diet, but suffering from cravings/hunger throughout the day. Exercises on indoor bike daily for 30 minutes.Water intake & sleep adequate. \par \par 10/13/20: Pt reports feeling well, taking phentermine 15 mg daily and denies side effects at present. Seen by  this month and denies issues with BP/HR. C/o post surgical breast pain which is limiting her exercise regimen, denies redness/swelling of breast, denies fevers. Continues to focus on eating a healthy diet. Reports sufficient appetite suppression in the morning until about 5 pm when she starts to experience cravings & tries to distract herself with cleaning and going to bed early. Water intake sufficient. Reports sleeping very well. \par \par 10/27/20: Pt seen via [facility]. Reports feeling well & lost 5 lbs since our last session. Has been trying to focus on eating healthy- inc vegetables and decrease carbs. Struggles on the weekend when she goes to the farm and has lasagna & apple pie. Just increased the phentermine dose to 2 pills per day, denies side effects. Breast pain has improved since she started wearing a supportive bra, which enables the pt to exercise again. Has been exercising on her bike for 20 mins 3 x week. Gets adequate water intake and sleep. Wasn't able to get her follow up labwork done before today.\par \par 12/11/20: Pt reports feeling well, down to 130 lbs- feels like she has more energy and fits into her clothes better. Continues to take phentermine, which helps with cravings- notices that she struggles with nighttime cravings if she doesn't take afternoon dose. Tries to focus on healthy snacks when she has cravings. Exercises on indoor bike 2 times per week for 30 minutes and feels that legs are tighter. Reviewed lab work results including: elevated LDL and elevated total cholesterol and low Vitamin D. Pts admitted to enjoying cheese every day as well as eating red meats and high fat dairy products. \par \par 1/8/20: Pt gained 2 lbs since our last session. Pt spoke with [facility] recently & was able to verbalize what she has learned about decreasing dietary fat intake. Stopped eating cheese this week and eats meat once per week. Focuses on eating small portion sizes, fish & salads. Has been taking phentermine once daily now and struggles with nighttime cravings. Struggles with meeting exercise goals. +Adequate water intake and sleep.\par \par 4/16/21: Pt gained 3 lbs since our last session, has been off of phentermine x 1 month and feels increased craving. Struggles with cravings for: french fries, pizza, and ice cream at night. Tries to focus on eating healthy throughout the day with salads & tuna fish and fruit throughout the day. + adequate water intake, + adequate sleep. Hasn't been exercising, trying to walk more since the weather is better. Says her height is 5 ft 1 not 5 ft 2. \par \par 5/18/21: Pt gained 5 lbs since our last session, struggling with cravings. Has children who enjoy eating bagels, pizza, cheetos, muffins. Food recall: B- bagel, L-salad with grilled chicken, chickpeas, avacodo, tomotoes, lemon for dressing, D- same salad, Snacks: 2 mangos and corn muffin. Water intake adequate. Denies formal exercising, has been walking outside on occasions and taking care of garden. \par \par 6/11/21: Continues to struggle with evening cravings and gained 2 lbs since our last session. BMI now 26.45. Pt is upset and wants to restart Phentermine to help with cravings. Ate a salad with grilled chicken & tomatoes, felt hungry an hour later and had 2 additional pieces of grilled chicken, felt hungry an hour later and ate popcorn. Trying to eat fruit for a snack most nights. Increased exercise regimen to 4 days per week walking 30 minutes. \par \par 7/8/21: Struggling with cravings and feels that her clothes fit tighter. Hasn't weighed herself today, last weight was 142 lbs. Ate a bagel with cream cheese this morning and snacking often. Walking for exercise 4 days per week x 30 mins. LMP 6/5/21, not currently on birth control. Requests medication to help with cravings.\par \par 9/21/21: Lost 4 lbs since last session. Took phentermine for 1 month and reports better control with food choices- reports eating more salads, grilled chicken tuna salad, turkey burgers, vegetables, and fruit. Walking for exercise 4 days per week. Water intake adequate. Has been off phentermine for 1 week and reports increased cravings at night r/t anxiety/emotional eating. \par \par 1/18/22: Gained 2 lbs since our last session. Continues to struggle with eating unhealthy options (ordered out a work, eating desserts that her children make, social events, and take out with children). Food recall: B- coffee with 2% milk and 1 splenda, cheerios, L- grilled chicken, tomatoes, onions, salad with lemon as dressing, sweet potato, D- salad + [employer] french fries. Water intake- adequate. Denies exercising. Would like to restart phentermine to help with cravings. Had labs done in Oct at PCPs office- plans to have results faxed to our office.\par \par 2/14/22: Gained 3 lbs. Struggling with cravings- has ice cream, desserts and pizza in the house for her children. Tries to only have 1 bite but ends up eating too much of the unhealthy choices. Food recall- B:cornflakes or cheerios with 2% milk, L-salad with chicken or fish, D- pizza or pasta or tuna sushi rolls, Dessert- had ice cream last night. Water intake- inadequate. Increased exercise routine- uses the stationary bike 4 times per wk x 20 mins. Reviewed lab results from Sep 2021 with pt- Total cholesterol is slightly elevated but improved, LDL cholesterol is normal & better.  Would like to resume phentermine to help with cravings. \par \par 3/15/22: Maintained weight, feels more energy, walking 2 times per day for exercise. Tolerating phentermine, denies side effects and reports good appetite suppression with dec cravings. Eating better overall- making chickpea pancakes. Water intake improved. \par \par 4/4/22: Lost 2 lbs. Reports feeling well. Taking phentermine 15 mg daily, denies side effects, still feels hungry throughout the day. Water intake-adequate. Exercising on stationary bike for 1 hour 4-5 times per week. Eating well- has fruit & avocado available at work for snacks. \par \par 5/9/22: Hasn't weighed herself recently, feels the same size. Tolerating phentermine 30 mg daily, denies side effects. Reports sleeping well, good appetite suppression until 3 pm, then she gets cravings. Tries to eat healthy options throughout the day but struggles when she sees trigger foods available at work. Water intake adeqaute, Walking 3-4 times per week for exercise. \par \par 7/21/22: Maintained weight, Has been off of phenetermine x 1 month and struggles with cravings. Continues to eat well for the most part- B: oatmeal, L- quinoa and spinach, D- zucchini & shrimp. Eats red meat 2 x per week, portugese rolls on weekend, ice cream for dessert. Denies exercising for last 2 months. Reviewed lab results- Cholesterol elevated, LDL elevated, 10 year ASCVD risk 1 %\par \par 8/22/22: Lost 1 lb, taking phentermine 30 mg daily, reports decreased cravings and feels better overall while taking phentermine 30 mg daily. Denies side effects. Walking daily for exercise and feels better in clothes. Eating less carbs & inc water intake daily.

## 2022-09-21 ENCOUNTER — APPOINTMENT (OUTPATIENT)
Dept: BARIATRICS/WEIGHT MGMT | Facility: CLINIC | Age: 52
End: 2022-09-21

## 2022-09-21 VITALS — HEIGHT: 61 IN | WEIGHT: 141 LBS | BODY MASS INDEX: 26.62 KG/M2

## 2022-09-21 PROCEDURE — 99442: CPT | Mod: 95

## 2022-09-21 NOTE — HISTORY OF PRESENT ILLNESS
[Home] : at home, [unfilled] , at the time of the visit. [Other Location: e.g. Home (Enter Location, City,State)___] : at [unfilled] [Verbal consent obtained from patient] : the patient, [unfilled] [FreeTextEntry1] : 50 y/o Female here for weight loss. \par Struggling with weight gain since she turned 41 y/o and after having her twin children 10 years ago.\par Highest adult weight 150, Lowest Adult Weight 110\par Goal weight 125, Currently weighs 140\par Childhood: skinny as a child\par LMP: started 7/6/20, uses condoms and cream for contraception\par Previous weight loss attempts:had liposuction in 2019- lost 10 lbs. Exercised at gym and enjoyed swimming before having children. Denies dieting, but tries to eat healthy (salads, lentil soups, vegetables & fruits). Denies snacking/eating dessert.\par Food recall yesterday- B: coffee, L: Ramen soup, D: 1 slice of homemade pizza\par Exercise: walks 2-3 times per week with a friend for 2 hours. Has access to indoor bicycle- not currently using. \par Water intake: sufficient\par Sleep: 8-9 hours per night \par Occupation: works at [employer] in [facility]- currently working from home\par Motivation: wants to feel happy/comfortable in a bathing suit\par Pt is scheduled to have surgery on 7/27 for breast implant removal with Dr. Sánchez\par \par 9/8/20: Pt unable to connect with [employer] through phone. Telephone discussion done instead. Reports feeling frustrated that she has gained 8 lbs since our last discussion. Has been trying to eat healthy diet, but suffering from cravings/hunger throughout the day. Exercises on indoor bike daily for 30 minutes.Water intake & sleep adequate. \par \par 10/13/20: Pt reports feeling well, taking phentermine 15 mg daily and denies side effects at present. Seen by  this month and denies issues with BP/HR. C/o post surgical breast pain which is limiting her exercise regimen, denies redness/swelling of breast, denies fevers. Continues to focus on eating a healthy diet. Reports sufficient appetite suppression in the morning until about 5 pm when she starts to experience cravings & tries to distract herself with cleaning and going to bed early. Water intake sufficient. Reports sleeping very well. \par \par 10/27/20: Pt seen via [email or web address]. Reports feeling well & lost 5 lbs since our last session. Has been trying to focus on eating healthy- inc vegetables and decrease carbs. Struggles on the weekend when she goes to the farm and has lasagna & apple pie. Just increased the phentermine dose to 2 pills per day, denies side effects. Breast pain has improved since she started wearing a supportive bra, which enables the pt to exercise again. Has been exercising on her bike for 20 mins 3 x week. Gets adequate water intake and sleep. Wasn't able to get her follow up labwork done before today.\par \par 12/11/20: Pt reports feeling well, down to 130 lbs- feels like she has more energy and fits into her clothes better. Continues to take phentermine, which helps with cravings- notices that she struggles with nighttime cravings if she doesn't take afternoon dose. Tries to focus on healthy snacks when she has cravings. Exercises on indoor bike 2 times per week for 30 minutes and feels that legs are tighter. Reviewed lab work results including: elevated LDL and elevated total cholesterol and low Vitamin D. Pts admitted to enjoying cheese every day as well as eating red meats and high fat dairy products. \par \par 1/8/20: Pt gained 2 lbs since our last session. Pt spoke with [employer] recently & was able to verbalize what she has learned about decreasing dietary fat intake. Stopped eating cheese this week and eats meat once per week. Focuses on eating small portion sizes, fish & salads. Has been taking phentermine once daily now and struggles with nighttime cravings. Struggles with meeting exercise goals. +Adequate water intake and sleep.\par \par 4/16/21: Pt gained 3 lbs since our last session, has been off of phentermine x 1 month and feels increased craving. Struggles with cravings for: french fries, pizza, and ice cream at night. Tries to focus on eating healthy throughout the day with salads & tuna fish and fruit throughout the day. + adequate water intake, + adequate sleep. Hasn't been exercising, trying to walk more since the weather is better. Says her height is 5 ft 1 not 5 ft 2. \par \par 5/18/21: Pt gained 5 lbs since our last session, struggling with cravings. Has children who enjoy eating bagels, pizza, cheetos, muffins. Food recall: B- bagel, L-salad with grilled chicken, chickpeas, avocado, tomatoes, lemon for dressing, D- same salad, Snacks: 2 mangos and corn muffin. Water intake adequate. Denies formal exercising, has been walking outside on occasions and taking care of garden. \par \par 6/11/21: Continues to struggle with evening cravings and gained 2 lbs since our last session. BMI now 26.45. Pt is upset and wants to restart Phentermine to help with cravings. Ate a salad with grilled chicken & tomatoes, felt hungry an hour later and had 2 additional pieces of grilled chicken, felt hungry an hour later and ate popcorn. Trying to eat fruit for a snack most nights. Increased exercise regimen to 4 days per week walking 30 minutes. \par \par 7/8/21: Struggling with cravings and feels that her clothes fit tighter. Hasn't weighed herself today, last weight was 142 lbs. Ate a bagel with cream cheese this morning and snacking often. Walking for exercise 4 days per week x 30 mins. LMP 6/5/21, not currently on birth control. Requests medication to help with cravings.\par \par 9/21/21: Lost 4 lbs since last session. Took phentermine for 1 month and reports better control with food choices- reports eating more salads, grilled chicken tuna salad, turkey burgers, vegetables, and fruit. Walking for exercise 4 days per week. Water intake adequate. Has been off phentermine for 1 week and reports increased cravings at night r/t anxiety/emotional eating. \par \par 1/18/22: Gained 2 lbs since our last session. Continues to struggle with eating unhealthy options (ordered out a work, eating desserts that her children make, social events, and take out with children). Food recall: B- coffee with 2% milk and 1 splenda, cheerios, L- grilled chicken, tomatoes, onions, salad with lemon as dressing, sweet potato, D- salad + [employer] french fries. Water intake- adequate. Denies exercising. Would like to restart phentermine to help with cravings. Had labs done in Oct at PCPs office- plans to have results faxed to our office.\par \par 2/14/22: Gained 3 lbs. Struggling with cravings- has ice cream, desserts and pizza in the house for her children. Tries to only have 1 bite but ends up eating too much of the unhealthy choices. Food recall- B:cornflakes or cheerios with 2% milk, L-salad with chicken or fish, D- pizza or pasta or tuna sushi rolls, Dessert- had ice cream last night. Water intake- inadequate. Increased exercise routine- uses the stationary bike 4 times per wk x 20 mins. Reviewed lab results from Sep 2021 with pt- Total cholesterol is slightly elevated but improved, LDL cholesterol is normal & better.  Would like to resume phentermine to help with cravings. \par \par 3/15/22: Maintained weight, feels more energy, walking 2 times per day for exercise. Tolerating phentermine, denies side effects and reports good appetite suppression with dec cravings. Eating better overall- making chickpea pancakes. Water intake improved. \par \par 4/4/22: Lost 2 lbs. Reports feeling well. Taking phentermine 15 mg daily, denies side effects, still feels hungry throughout the day. Water intake-adequate. Exercising on stationary bike for 1 hour 4-5 times per week. Eating well- has fruit & avocado available at work for snacks. \par \par 5/9/22: Hasn't weighed herself recently, feels the same size. Tolerating phentermine 30 mg daily, denies side effects. Reports sleeping well, good appetite suppression until 3 pm, then she gets cravings. Tries to eat healthy options throughout the day but struggles when she sees trigger foods available at work. Water intake adeqaute, Walking 3-4 times per week for exercise. \par \par 7/21/22: Maintained weight, Has been off of phenetermine x 1 month and struggles with cravings. Continues to eat well for the most part- B: oatmeal, L- quinoa and spinach, D- zucchini & shrimp. Eats red meat 2 x per week, portugese rolls on weekend, ice cream for dessert. Denies exercising for last 2 months. Reviewed lab results- Cholesterol elevated, LDL elevated, 10 year ASCVD risk 1 %\par \par 8/22/22: Lost 1 lb, taking phentermine 30 mg daily, reports decreased cravings and feels better overall while taking phentermine 30 mg daily. Denies side effects. Walking daily for exercise and feels better in clothes. Eating less carbs & inc water intake daily. \par \par 9/21/22: Has been making healthier options overall, making salads with shrimp and healthy breakfast options. Walking for exercise and plans on biking. Wants to continue to take phentermine daily since it helps with cravings.

## 2022-09-21 NOTE — ASSESSMENT
[FreeTextEntry1] : Continue current lifestyle modification. Discussed other ways to reduce bad fat intake \par Medication- wants to continue phentermine 15 mg to help with cravings \par RTO in 1 month- scheduled a f/u for 10/19 at 8:30\par

## 2022-10-19 ENCOUNTER — APPOINTMENT (OUTPATIENT)
Dept: BARIATRICS/WEIGHT MGMT | Facility: CLINIC | Age: 52
End: 2022-10-19

## 2022-12-21 NOTE — REVIEW OF SYSTEMS
Met with patient and spouse in person to discuss upcoming systemic therapy initiation. Discussed patient diagnosis including staging information. Also discussed that therapy regimen prescribed involves the following drugs: _Caboplatin/Paclitaxel/Opdivo/Yervo (to be determined when trial randomization is completed_, and timeline of therapy administration. Went over what to expect on first day of treatment, including what to bring with you, visitor policy, and infusion suite guidelines. Also discussed supportive and shared services available to patient, including social work, financial counseling, oncology nutrition, and oncology psychology.      Covered with patient potential side effects and symptom management. Reviewed supportive medications that will be given before, during, and after treatment. Also stressed that other side effects are possible outside of those listed. Spent additional time on signs of infection, infection prevention, and proper nutrition/hydration.    Education provided to patient via  chemotherapy education binder. Also provided contact information for clinic and information related to [facility] communication. Discussed communication process for after-hours needs and some common scenarios in which patient should call provider for guidance vs. immediately report to the emergency room.     Finally, patient was given my contact information and role of oncology navigator was discussed. Encouraged patient to call with any questions, concerns, or needs. Patient verbalized understanding of all above information.       [Negative] : Allergic/Immunologic

## 2023-01-04 ENCOUNTER — APPOINTMENT (OUTPATIENT)
Dept: PEDIATRIC ORTHOPEDIC SURGERY | Facility: CLINIC | Age: 53
End: 2023-01-04

## 2023-02-06 ENCOUNTER — APPOINTMENT (OUTPATIENT)
Dept: BARIATRICS/WEIGHT MGMT | Facility: CLINIC | Age: 53
End: 2023-02-06

## 2023-02-09 ENCOUNTER — APPOINTMENT (OUTPATIENT)
Dept: PEDIATRIC ORTHOPEDIC SURGERY | Facility: CLINIC | Age: 53
End: 2023-02-09
Payer: COMMERCIAL

## 2023-02-09 VITALS
BODY MASS INDEX: 26.62 KG/M2 | SYSTOLIC BLOOD PRESSURE: 112 MMHG | TEMPERATURE: 98.1 F | WEIGHT: 141 LBS | HEIGHT: 61 IN | DIASTOLIC BLOOD PRESSURE: 82 MMHG

## 2023-02-09 DIAGNOSIS — M23.8X1 OTHER INTERNAL DERANGEMENTS OF RIGHT KNEE: ICD-10-CM

## 2023-02-09 PROCEDURE — 73562 X-RAY EXAM OF KNEE 3: CPT

## 2023-02-09 PROCEDURE — 99202 OFFICE O/P NEW SF 15 MIN: CPT

## 2023-02-09 NOTE — PHYSICAL EXAM
[de-identified] : Examination today reveals a very minimal antalgic component to the right knee.  She has full motion to the right hip and right knee symmetric to the opposite side the right knee has a small effusion no evidence of instability on stress of the cruciate/collateral ligaments.  She has no significant tenderness to either joint line with a negative Steinmann.  No significant pain on patellofemoral grind.  Mild discomfort in the popliteal fossa with a question of a small cyst present.\par \par X-rays ordered and taken today of the right knee including a Ivan view reveal no significant abnormality

## 2023-02-09 NOTE — CONSULT LETTER
[Dear  ___] : Dear  [unfilled], [Consult Letter:] : I had the pleasure of evaluating your patient, [unfilled]. [Please see my note below.] : Please see my note below. [Consult Closing:] : Thank you very much for allowing me to participate in the care of this patient.  If you have any questions, please do not hesitate to contact me. [Sincerely,] : Sincerely, [FreeTextEntry3] : Dr Renny Blanchard JR.\par

## 2023-02-09 NOTE — ASSESSMENT
[FreeTextEntry1] : Impression: Internal derangement right knee rule out popliteal cyst.\par \par She has been placed on Mobic with GI precautions.  Formal physical therapy has been ordered.  I have also ordered a sonogram of the knee.  I will be in contact with the pending results of the sonogram

## 2023-02-09 NOTE — HISTORY OF PRESENT ILLNESS
[de-identified] : This 52-year-old pleasant lady is seen today for evaluation of her right knee.  This patient over the past 2-1/2 months has had discomfort to her knee more so posteriorly.  No obvious history of trauma precipitating event.  No significant swelling locking buckling or sensation of instability.  Mild stiffness.  She is functioning well.  Past medical history is noncontributory

## 2023-02-14 ENCOUNTER — RESULT REVIEW (OUTPATIENT)
Age: 53
End: 2023-02-14

## 2023-03-06 ENCOUNTER — RESULT REVIEW (OUTPATIENT)
Age: 53
End: 2023-03-06

## 2023-03-06 ENCOUNTER — APPOINTMENT (OUTPATIENT)
Dept: BARIATRICS/WEIGHT MGMT | Facility: CLINIC | Age: 53
End: 2023-03-06
Payer: COMMERCIAL

## 2023-03-06 ENCOUNTER — APPOINTMENT (OUTPATIENT)
Dept: BARIATRICS/WEIGHT MGMT | Facility: CLINIC | Age: 53
End: 2023-03-06

## 2023-03-06 PROCEDURE — 99213 OFFICE O/P EST LOW 20 MIN: CPT | Mod: 95

## 2023-03-06 NOTE — HISTORY OF PRESENT ILLNESS
[Home] : at home, [unfilled] , at the time of the visit. [Other Location: e.g. Home (Enter Location, City,State)___] : at [unfilled] [Verbal consent obtained from patient] : the patient, [unfilled] [FreeTextEntry1] : 52 y/o Female here for weight loss. \par Struggling with weight gain since she turned 41 y/o and after having her twin children 10 years ago.\par Highest adult weight 150, Lowest Adult Weight 110\par Goal weight 125, Currently weighs 140\par Childhood: skinny as a child\par LMP: started 7/6/20, uses condoms and cream for contraception\par Previous weight loss attempts:had liposuction in 2019- lost 10 lbs. Exercised at gym and enjoyed swimming before having children. Denies dieting, but tries to eat healthy (salads, lentil soups, vegetables & fruits). Denies snacking/eating dessert.\par Food recall yesterday- B: coffee, L: Ramen soup, D: 1 slice of homemade pizza\par Exercise: walks 2-3 times per week with a friend for 2 hours. Has access to indoor bicycle- not currently using. \par Water intake: sufficient\par Sleep: 8-9 hours per night \par Occupation: works at [employer] in [facility]- currently working from home\par Motivation: wants to feel happy/comfortable in a bathing suit\par Pt is scheduled to have surgery on 7/27 for breast implant removal with Dr. Sánchez\par \par 9/8/20: Pt unable to connect with [employer] through phone. Telephone discussion done instead. Reports feeling frustrated that she has gained 8 lbs since our last discussion. Has been trying to eat healthy diet, but suffering from cravings/hunger throughout the day. Exercises on indoor bike daily for 30 minutes.Water intake & sleep adequate. \par \par 10/13/20: Pt reports feeling well, taking phentermine 15 mg daily and denies side effects at present. Seen by  this month and denies issues with BP/HR. C/o post surgical breast pain which is limiting her exercise regimen, denies redness/swelling of breast, denies fevers. Continues to focus on eating a healthy diet. Reports sufficient appetite suppression in the morning until about 5 pm when she starts to experience cravings & tries to distract herself with cleaning and going to bed early. Water intake sufficient. Reports sleeping very well. \par \par 10/27/20: Pt seen via [employer]. Reports feeling well & lost 5 lbs since our last session. Has been trying to focus on eating healthy- inc vegetables and decrease carbs. Struggles on the weekend when she goes to the farm and has lasagna & apple pie. Just increased the phentermine dose to 2 pills per day, denies side effects. Breast pain has improved since she started wearing a supportive bra, which enables the pt to exercise again. Has been exercising on her bike for 20 mins 3 x week. Gets adequate water intake and sleep. Wasn't able to get her follow up labwork done before today.\par \par 12/11/20: Pt reports feeling well, down to 130 lbs- feels like she has more energy and fits into her clothes better. Continues to take phentermine, which helps with cravings- notices that she struggles with nighttime cravings if she doesn't take afternoon dose. Tries to focus on healthy snacks when she has cravings. Exercises on indoor bike 2 times per week for 30 minutes and feels that legs are tighter. Reviewed lab work results including: elevated LDL and elevated total cholesterol and low Vitamin D. Pts admitted to enjoying cheese every day as well as eating red meats and high fat dairy products. \par \par 1/8/20: Pt gained 2 lbs since our last session. Pt spoke with [employer] recently & was able to verbalize what she has learned about decreasing dietary fat intake. Stopped eating cheese this week and eats meat once per week. Focuses on eating small portion sizes, fish & salads. Has been taking phentermine once daily now and struggles with nighttime cravings. Struggles with meeting exercise goals. +Adequate water intake and sleep.\par \par 4/16/21: Pt gained 3 lbs since our last session, has been off of phentermine x 1 month and feels increased craving. Struggles with cravings for: french fries, pizza, and ice cream at night. Tries to focus on eating healthy throughout the day with salads & tuna fish and fruit throughout the day. + adequate water intake, + adequate sleep. Hasn't been exercising, trying to walk more since the weather is better. Says her height is 5 ft 1 not 5 ft 2. \par \par 5/18/21: Pt gained 5 lbs since our last session, struggling with cravings. Has children who enjoy eating bagels, pizza, cheetos, muffins. Food recall: B- bagel, L-salad with grilled chicken, chickpeas, avocado, tomatoes, lemon for dressing, D- same salad, Snacks: 2 mangos and corn muffin. Water intake adequate. Denies formal exercising, has been walking outside on occasions and taking care of garden. \par \par 6/11/21: Continues to struggle with evening cravings and gained 2 lbs since our last session. BMI now 26.45. Pt is upset and wants to restart Phentermine to help with cravings. Ate a salad with grilled chicken & tomatoes, felt hungry an hour later and had 2 additional pieces of grilled chicken, felt hungry an hour later and ate popcorn. Trying to eat fruit for a snack most nights. Increased exercise regimen to 4 days per week walking 30 minutes. \par \par 7/8/21: Struggling with cravings and feels that her clothes fit tighter. Hasn't weighed herself today, last weight was 142 lbs. Ate a bagel with cream cheese this morning and snacking often. Walking for exercise 4 days per week x 30 mins. LMP 6/5/21, not currently on birth control. Requests medication to help with cravings.\par \par 9/21/21: Lost 4 lbs since last session. Took phentermine for 1 month and reports better control with food choices- reports eating more salads, grilled chicken tuna salad, turkey burgers, vegetables, and fruit. Walking for exercise 4 days per week. Water intake adequate. Has been off phentermine for 1 week and reports increased cravings at night r/t anxiety/emotional eating. \par \par 1/18/22: Gained 2 lbs since our last session. Continues to struggle with eating unhealthy options (ordered out a work, eating desserts that her children make, social events, and take out with children). Food recall: B- coffee with 2% milk and 1 splenda, cheerios, L- grilled chicken, tomatoes, onions, salad with lemon as dressing, sweet potato, D- salad + [employer] french fries. Water intake- adequate. Denies exercising. Would like to restart phentermine to help with cravings. Had labs done in Oct at PCPs office- plans to have results faxed to our office.\par \par 2/14/22: Gained 3 lbs. Struggling with cravings- has ice cream, desserts and pizza in the house for her children. Tries to only have 1 bite but ends up eating too much of the unhealthy choices. Food recall- B:cornflakes or cheerios with 2% milk, L-salad with chicken or fish, D- pizza or pasta or tuna sushi rolls, Dessert- had ice cream last night. Water intake- inadequate. Increased exercise routine- uses the stationary bike 4 times per wk x 20 mins. Reviewed lab results from Sep 2021 with pt- Total cholesterol is slightly elevated but improved, LDL cholesterol is normal & better.  Would like to resume phentermine to help with cravings. \par \par 3/15/22: Maintained weight, feels more energy, walking 2 times per day for exercise. Tolerating phentermine, denies side effects and reports good appetite suppression with dec cravings. Eating better overall- making chickpea pancakes. Water intake improved. \par \par 4/4/22: Lost 2 lbs. Reports feeling well. Taking phentermine 15 mg daily, denies side effects, still feels hungry throughout the day. Water intake-adequate. Exercising on stationary bike for 1 hour 4-5 times per week. Eating well- has fruit & avocado available at work for snacks. \par \par 5/9/22: Hasn't weighed herself recently, feels the same size. Tolerating phentermine 30 mg daily, denies side effects. Reports sleeping well, good appetite suppression until 3 pm, then she gets cravings. Tries to eat healthy options throughout the day but struggles when she sees trigger foods available at work. Water intake adeqaute, Walking 3-4 times per week for exercise. \par \par 7/21/22: Maintained weight, Has been off of phenetermine x 1 month and struggles with cravings. Continues to eat well for the most part- B: oatmeal, L- quinoa and spinach, D- zucchini & shrimp. Eats red meat 2 x per week, portugese rolls on weekend, ice cream for dessert. Denies exercising for last 2 months. Reviewed lab results- Cholesterol elevated, LDL elevated, 10 year ASCVD risk 1 %\par \par 8/22/22: Lost 1 lb, taking phentermine 30 mg daily, reports decreased cravings and feels better overall while taking phentermine 30 mg daily. Denies side effects. Walking daily for exercise and feels better in clothes. Eating less carbs & inc water intake daily. \par \par 9/21/22: Has been making healthier options overall, making salads with shrimp and healthy breakfast options. Walking for exercise and plans on biking. Wants to continue to take phentermine daily since it helps with cravings.\par \par 3/6/23: Plans on starting to exercise with her daughter- bike, walk, sit ups. Dietary- off track with eating healthy and water intake due to increased stress r/t father-in-laws declining health. Has been off medication and feeling more hunger. Wants to get back on phentermine.

## 2023-03-06 NOTE — ASSESSMENT
[FreeTextEntry1] : Continue current lifestyle modification. Continue to focus on food prep/ meal planning, focus on lean protein, vegetables, and whole grains. Recommend bringing her own lunch when she goes to work. \par Exercise routine- start focusing on increasing exercise regimen\par Medication- wants to restart on phentermine to help with cravings \par Labs- ordered labs \par RTO in 1 month\par

## 2023-04-10 ENCOUNTER — APPOINTMENT (OUTPATIENT)
Dept: BARIATRICS/WEIGHT MGMT | Facility: CLINIC | Age: 53
End: 2023-04-10
Payer: COMMERCIAL

## 2023-04-10 PROCEDURE — 99213 OFFICE O/P EST LOW 20 MIN: CPT | Mod: 95

## 2023-04-10 NOTE — HISTORY OF PRESENT ILLNESS
[Home] : at home, [unfilled] , at the time of the visit. [Other Location: e.g. Home (Enter Location, City,State)___] : at [unfilled] [Verbal consent obtained from patient] : the patient, [unfilled] [FreeTextEntry1] : 52 y/o Female here for weight loss. \par Struggling with weight gain since she turned 43 y/o and after having her twin children 10 years ago.\par Highest adult weight 150, Lowest Adult Weight 110\par Goal weight 125, Currently weighs 140\par Childhood: skinny as a child\par LMP: started 7/6/20, uses condoms and cream for contraception\par Previous weight loss attempts:had liposuction in 2019- lost 10 lbs. Exercised at gym and enjoyed swimming before having children. Denies dieting, but tries to eat healthy (salads, lentil soups, vegetables & fruits). Denies snacking/eating dessert.\par Food recall yesterday- B: coffee, L: Ramen soup, D: 1 slice of homemade pizza\par Exercise: walks 2-3 times per week with a friend for 2 hours. Has access to indoor bicycle- not currently using. \par Water intake: sufficient\par Sleep: 8-9 hours per night \par Occupation: works at [employer] in [facility]- currently working from home\par Motivation: wants to feel happy/comfortable in a bathing suit\par Pt is scheduled to have surgery on 7/27 for breast implant removal with Dr. Sánchez\par \par 9/8/20: Pt unable to connect with [facility] through phone. Telephone discussion done instead. Reports feeling frustrated that she has gained 8 lbs since our last discussion. Has been trying to eat healthy diet, but suffering from cravings/hunger throughout the day. Exercises on indoor bike daily for 30 minutes.Water intake & sleep adequate. \par \par 10/13/20: Pt reports feeling well, taking phentermine 15 mg daily and denies side effects at present. Seen by  this month and denies issues with BP/HR. C/o post surgical breast pain which is limiting her exercise regimen, denies redness/swelling of breast, denies fevers. Continues to focus on eating a healthy diet. Reports sufficient appetite suppression in the morning until about 5 pm when she starts to experience cravings & tries to distract herself with cleaning and going to bed early. Water intake sufficient. Reports sleeping very well. \par \par 10/27/20: Pt seen via [employer]. Reports feeling well & lost 5 lbs since our last session. Has been trying to focus on eating healthy- inc vegetables and decrease carbs. Struggles on the weekend when she goes to the farm and has lasagna & apple pie. Just increased the phentermine dose to 2 pills per day, denies side effects. Breast pain has improved since she started wearing a supportive bra, which enables the pt to exercise again. Has been exercising on her bike for 20 mins 3 x week. Gets adequate water intake and sleep. Wasn't able to get her follow up labwork done before today.\par \par 12/11/20: Pt reports feeling well, down to 130 lbs- feels like she has more energy and fits into her clothes better. Continues to take phentermine, which helps with cravings- notices that she struggles with nighttime cravings if she doesn't take afternoon dose. Tries to focus on healthy snacks when she has cravings. Exercises on indoor bike 2 times per week for 30 minutes and feels that legs are tighter. Reviewed lab work results including: elevated LDL and elevated total cholesterol and low Vitamin D. Pts admitted to enjoying cheese every day as well as eating red meats and high fat dairy products. \par \par 1/8/20: Pt gained 2 lbs since our last session. Pt spoke with [employer] recently & was able to verbalize what she has learned about decreasing dietary fat intake. Stopped eating cheese this week and eats meat once per week. Focuses on eating small portion sizes, fish & salads. Has been taking phentermine once daily now and struggles with nighttime cravings. Struggles with meeting exercise goals. +Adequate water intake and sleep.\par \par 4/16/21: Pt gained 3 lbs since our last session, has been off of phentermine x 1 month and feels increased craving. Struggles with cravings for: french fries, pizza, and ice cream at night. Tries to focus on eating healthy throughout the day with salads & tuna fish and fruit throughout the day. + adequate water intake, + adequate sleep. Hasn't been exercising, trying to walk more since the weather is better. Says her height is 5 ft 1 not 5 ft 2. \par \par 5/18/21: Pt gained 5 lbs since our last session, struggling with cravings. Has children who enjoy eating bagels, pizza, cheetos, muffins. Food recall: B- bagel, L-salad with grilled chicken, chickpeas, avocado, tomatoes, lemon for dressing, D- same salad, Snacks: 2 mangos and corn muffin. Water intake adequate. Denies formal exercising, has been walking outside on occasions and taking care of garden. \par \par 6/11/21: Continues to struggle with evening cravings and gained 2 lbs since our last session. BMI now 26.45. Pt is upset and wants to restart Phentermine to help with cravings. Ate a salad with grilled chicken & tomatoes, felt hungry an hour later and had 2 additional pieces of grilled chicken, felt hungry an hour later and ate popcorn. Trying to eat fruit for a snack most nights. Increased exercise regimen to 4 days per week walking 30 minutes. \par \par 7/8/21: Struggling with cravings and feels that her clothes fit tighter. Hasn't weighed herself today, last weight was 142 lbs. Ate a bagel with cream cheese this morning and snacking often. Walking for exercise 4 days per week x 30 mins. LMP 6/5/21, not currently on birth control. Requests medication to help with cravings.\par \par 9/21/21: Lost 4 lbs since last session. Took phentermine for 1 month and reports better control with food choices- reports eating more salads, grilled chicken tuna salad, turkey burgers, vegetables, and fruit. Walking for exercise 4 days per week. Water intake adequate. Has been off phentermine for 1 week and reports increased cravings at night r/t anxiety/emotional eating. \par \par 1/18/22: Gained 2 lbs since our last session. Continues to struggle with eating unhealthy options (ordered out a work, eating desserts that her children make, social events, and take out with children). Food recall: B- coffee with 2% milk and 1 splenda, cheerios, L- grilled chicken, tomatoes, onions, salad with lemon as dressing, sweet potato, D- salad + [employer] french fries. Water intake- adequate. Denies exercising. Would like to restart phentermine to help with cravings. Had labs done in Oct at PCPs office- plans to have results faxed to our office.\par \par 2/14/22: Gained 3 lbs. Struggling with cravings- has ice cream, desserts and pizza in the house for her children. Tries to only have 1 bite but ends up eating too much of the unhealthy choices. Food recall- B:cornflakes or cheerios with 2% milk, L-salad with chicken or fish, D- pizza or pasta or tuna sushi rolls, Dessert- had ice cream last night. Water intake- inadequate. Increased exercise routine- uses the stationary bike 4 times per wk x 20 mins. Reviewed lab results from Sep 2021 with pt- Total cholesterol is slightly elevated but improved, LDL cholesterol is normal & better.  Would like to resume phentermine to help with cravings. \par \par 3/15/22: Maintained weight, feels more energy, walking 2 times per day for exercise. Tolerating phentermine, denies side effects and reports good appetite suppression with dec cravings. Eating better overall- making chickpea pancakes. Water intake improved. \par \par 4/4/22: Lost 2 lbs. Reports feeling well. Taking phentermine 15 mg daily, denies side effects, still feels hungry throughout the day. Water intake-adequate. Exercising on stationary bike for 1 hour 4-5 times per week. Eating well- has fruit & avocado available at work for snacks. \par \par 5/9/22: Hasn't weighed herself recently, feels the same size. Tolerating phentermine 30 mg daily, denies side effects. Reports sleeping well, good appetite suppression until 3 pm, then she gets cravings. Tries to eat healthy options throughout the day but struggles when she sees trigger foods available at work. Water intake adeqaute, Walking 3-4 times per week for exercise. \par \par 7/21/22: Maintained weight, Has been off of phenetermine x 1 month and struggles with cravings. Continues to eat well for the most part- B: oatmeal, L- quinoa and spinach, D- zucchini & shrimp. Eats red meat 2 x per week, portugese rolls on weekend, ice cream for dessert. Denies exercising for last 2 months. Reviewed lab results- Cholesterol elevated, LDL elevated, 10 year ASCVD risk 1 %\par \par 8/22/22: Lost 1 lb, taking phentermine 30 mg daily, reports decreased cravings and feels better overall while taking phentermine 30 mg daily. Denies side effects. Walking daily for exercise and feels better in clothes. Eating less carbs & inc water intake daily. \par \par 9/21/22: Has been making healthier options overall, making salads with shrimp and healthy breakfast options. Walking for exercise and plans on biking. Wants to continue to take phentermine daily since it helps with cravings.\par \par 3/6/23: Plans on starting to exercise with her daughter- bike, walk, sit ups. Dietary- off track with eating healthy and water intake due to increased stress r/t father-in-laws declining health. Has been off medication and feeling more hunger. Wants to get back on phentermine. \par \par 4/10/23: Hasn't been able to get labs done. Drinking more water daily. Taking phentermine daily- reports decreased  appetite, but sometimes has cravings for ice cream. Has been using the bike for exercise half hour most days. Struggles at work when they get free food.

## 2023-04-10 NOTE — ASSESSMENT
[FreeTextEntry1] : Continue current lifestyle modification. Discussed other ways to reduce bad fat intake. Discussed the importance of bringing her own lunch to avoid eating provided lunches. Discussed a healthier alternative for dessert- yasso bars instead of ice cream.\par Medication- wants to continue phentermine 15 mg- 30 mg daily \par Continue to exercise daily\par Labs- get done at [facility] before our next apt \par RTO in 1 month- 5/8 at 11\par

## 2023-04-18 RX ORDER — PHENTERMINE HYDROCHLORIDE 30 MG/1
30 CAPSULE ORAL
Qty: 30 | Refills: 1 | Status: COMPLETED | COMMUNITY
Start: 2022-04-05 | End: 2023-04-18

## 2023-04-18 RX ORDER — PHENTERMINE HYDROCHLORIDE 15 MG/1
15 CAPSULE ORAL
Qty: 60 | Refills: 0 | Status: COMPLETED | COMMUNITY
Start: 2022-09-21 | End: 2023-04-18

## 2023-05-08 ENCOUNTER — APPOINTMENT (OUTPATIENT)
Dept: BARIATRICS/WEIGHT MGMT | Facility: CLINIC | Age: 53
End: 2023-05-08
Payer: COMMERCIAL

## 2023-05-08 PROCEDURE — 99212 OFFICE O/P EST SF 10 MIN: CPT | Mod: 95

## 2023-05-08 NOTE — ASSESSMENT
[FreeTextEntry1] : Continue current lifestyle modification. \par Exercise when surgically clear \par Medication- continue phentermine daily \par Labs- plans to have fasting labs this weekend. \par RTO in 1 month\par

## 2023-05-08 NOTE — HISTORY OF PRESENT ILLNESS
[Home] : at home, [unfilled] , at the time of the visit. [Other Location: e.g. Home (Enter Location, City,State)___] : at [unfilled] [Verbal consent obtained from patient] : the patient, [unfilled] [FreeTextEntry1] : 50 y/o Female here for weight loss. \par Struggling with weight gain since she turned 41 y/o and after having her twin children 10 years ago.\par Highest adult weight 150, Lowest Adult Weight 110\par Goal weight 125, Currently weighs 140\par Childhood: skinny as a child\par LMP: started 7/6/20, uses condoms and cream for contraception\par Previous weight loss attempts:had liposuction in 2019- lost 10 lbs. Exercised at gym and enjoyed swimming before having children. Denies dieting, but tries to eat healthy (salads, lentil soups, vegetables & fruits). Denies snacking/eating dessert.\par Food recall yesterday- B: coffee, L: Ramen soup, D: 1 slice of homemade pizza\par Exercise: walks 2-3 times per week with a friend for 2 hours. Has access to indoor bicycle- not currently using. \par Water intake: sufficient\par Sleep: 8-9 hours per night \par Occupation: works at [employer] in [facility]- currently working from home\par Motivation: wants to feel happy/comfortable in a bathing suit\par Pt is scheduled to have surgery on 7/27 for breast implant removal with Dr. Sánchez\par \par 9/8/20: Pt unable to connect with [employer] through phone. Telephone discussion done instead. Reports feeling frustrated that she has gained 8 lbs since our last discussion. Has been trying to eat healthy diet, but suffering from cravings/hunger throughout the day. Exercises on indoor bike daily for 30 minutes.Water intake & sleep adequate. \par \par 10/13/20: Pt reports feeling well, taking phentermine 15 mg daily and denies side effects at present. Seen by  this month and denies issues with BP/HR. C/o post surgical breast pain which is limiting her exercise regimen, denies redness/swelling of breast, denies fevers. Continues to focus on eating a healthy diet. Reports sufficient appetite suppression in the morning until about 5 pm when she starts to experience cravings & tries to distract herself with cleaning and going to bed early. Water intake sufficient. Reports sleeping very well. \par \par 10/27/20: Pt seen via [facility]. Reports feeling well & lost 5 lbs since our last session. Has been trying to focus on eating healthy- inc vegetables and decrease carbs. Struggles on the weekend when she goes to the farm and has lasagna & apple pie. Just increased the phentermine dose to 2 pills per day, denies side effects. Breast pain has improved since she started wearing a supportive bra, which enables the pt to exercise again. Has been exercising on her bike for 20 mins 3 x week. Gets adequate water intake and sleep. Wasn't able to get her follow up labwork done before today.\par \par 12/11/20: Pt reports feeling well, down to 130 lbs- feels like she has more energy and fits into her clothes better. Continues to take phentermine, which helps with cravings- notices that she struggles with nighttime cravings if she doesn't take afternoon dose. Tries to focus on healthy snacks when she has cravings. Exercises on indoor bike 2 times per week for 30 minutes and feels that legs are tighter. Reviewed lab work results including: elevated LDL and elevated total cholesterol and low Vitamin D. Pts admitted to enjoying cheese every day as well as eating red meats and high fat dairy products. \par \par 1/8/20: Pt gained 2 lbs since our last session. Pt spoke with [employer] recently & was able to verbalize what she has learned about decreasing dietary fat intake. Stopped eating cheese this week and eats meat once per week. Focuses on eating small portion sizes, fish & salads. Has been taking phentermine once daily now and struggles with nighttime cravings. Struggles with meeting exercise goals. +Adequate water intake and sleep.\par \par 4/16/21: Pt gained 3 lbs since our last session, has been off of phentermine x 1 month and feels increased craving. Struggles with cravings for: french fries, pizza, and ice cream at night. Tries to focus on eating healthy throughout the day with salads & tuna fish and fruit throughout the day. + adequate water intake, + adequate sleep. Hasn't been exercising, trying to walk more since the weather is better. Says her height is 5 ft 1 not 5 ft 2. \par \par 5/18/21: Pt gained 5 lbs since our last session, struggling with cravings. Has children who enjoy eating bagels, pizza, cheetos, muffins. Food recall: B- bagel, L-salad with grilled chicken, chickpeas, avocado, tomatoes, lemon for dressing, D- same salad, Snacks: 2 mangos and corn muffin. Water intake adequate. Denies formal exercising, has been walking outside on occasions and taking care of garden. \par \par 6/11/21: Continues to struggle with evening cravings and gained 2 lbs since our last session. BMI now 26.45. Pt is upset and wants to restart Phentermine to help with cravings. Ate a salad with grilled chicken & tomatoes, felt hungry an hour later and had 2 additional pieces of grilled chicken, felt hungry an hour later and ate popcorn. Trying to eat fruit for a snack most nights. Increased exercise regimen to 4 days per week walking 30 minutes. \par \par 7/8/21: Struggling with cravings and feels that her clothes fit tighter. Hasn't weighed herself today, last weight was 142 lbs. Ate a bagel with cream cheese this morning and snacking often. Walking for exercise 4 days per week x 30 mins. LMP 6/5/21, not currently on birth control. Requests medication to help with cravings.\par \par 9/21/21: Lost 4 lbs since last session. Took phentermine for 1 month and reports better control with food choices- reports eating more salads, grilled chicken tuna salad, turkey burgers, vegetables, and fruit. Walking for exercise 4 days per week. Water intake adequate. Has been off phentermine for 1 week and reports increased cravings at night r/t anxiety/emotional eating. \par \par 1/18/22: Gained 2 lbs since our last session. Continues to struggle with eating unhealthy options (ordered out a work, eating desserts that her children make, social events, and take out with children). Food recall: B- coffee with 2% milk and 1 splenda, cheerios, L- grilled chicken, tomatoes, onions, salad with lemon as dressing, sweet potato, D- salad + [employer] french fries. Water intake- adequate. Denies exercising. Would like to restart phentermine to help with cravings. Had labs done in Oct at [facility]Ps office- plans to have results faxed to our office.\par \par 2/14/22: Gained 3 lbs. Struggling with cravings- has ice cream, desserts and pizza in the house for her children. Tries to only have 1 bite but ends up eating too much of the unhealthy choices. Food recall- B:cornflakes or cheerios with 2% milk, L-salad with chicken or fish, D- pizza or pasta or tuna sushi rolls, Dessert- had ice cream last night. Water intake- inadequate. Increased exercise routine- uses the stationary bike 4 times per wk x 20 mins. Reviewed lab results from Sep 2021 with pt- Total cholesterol is slightly elevated but improved, LDL cholesterol is normal & better.  Would like to resume phentermine to help with cravings. \par \par 3/15/22: Maintained weight, feels more energy, walking 2 times per day for exercise. Tolerating phentermine, denies side effects and reports good appetite suppression with dec cravings. Eating better overall- making chickpea pancakes. Water intake improved. \par \par 4/4/22: Lost 2 lbs. Reports feeling well. Taking phentermine 15 mg daily, denies side effects, still feels hungry throughout the day. Water intake-adequate. Exercising on stationary bike for 1 hour 4-5 times per week. Eating well- has fruit & avocado available at work for snacks. \par \par 5/9/22: Hasn't weighed herself recently, feels the same size. Tolerating phentermine 30 mg daily, denies side effects. Reports sleeping well, good appetite suppression until 3 pm, then she gets cravings. Tries to eat healthy options throughout the day but struggles when she sees trigger foods available at work. Water intake adeqaute, Walking 3-4 times per week for exercise. \par \par 7/21/22: Maintained weight, Has been off of phenetermine x 1 month and struggles with cravings. Continues to eat well for the most part- B: oatmeal, L- quinoa and spinach, D- zucchini & shrimp. Eats red meat 2 x per week, portugese rolls on weekend, ice cream for dessert. Denies exercising for last 2 months. Reviewed lab results- Cholesterol elevated, LDL elevated, 10 year ASCVD risk 1 %\par \par 8/22/22: Lost 1 lb, taking phentermine 30 mg daily, reports decreased cravings and feels better overall while taking phentermine 30 mg daily. Denies side effects. Walking daily for exercise and feels better in clothes. Eating less carbs & inc water intake daily. \par \par 9/21/22: Has been making healthier options overall, making salads with shrimp and healthy breakfast options. Walking for exercise and plans on biking. Wants to continue to take phentermine daily since it helps with cravings.\par \par 3/6/23: Plans on starting to exercise with her daughter- bike, walk, sit ups. Dietary- off track with eating healthy and water intake due to increased stress r/t father-in-laws declining health. Has been off medication and feeling more hunger. Wants to get back on phentermine. \par \par 4/10/23: Hasn't been able to get labs done. Drinking more water daily. Taking phentermine daily- reports decreased  appetite, but sometimes has cravings for ice cream. Has been using the bike for exercise half hour most days. Struggles at work when they get free food. \par \par 5/8/23: Had a procedure on right knee, drained fluid. Inhibits exercise plan the last 2 weeks.  Dietary- ate soups, salad, 2 hard boiled eggs. Hasn't been on the scale. Going to see ortho on Friday. Drinking adequate water.

## 2023-08-23 ENCOUNTER — APPOINTMENT (OUTPATIENT)
Dept: BARIATRICS/WEIGHT MGMT | Facility: CLINIC | Age: 53
End: 2023-08-23
Payer: COMMERCIAL

## 2023-08-23 VITALS — BODY MASS INDEX: 26.45 KG/M2 | WEIGHT: 140 LBS

## 2023-08-23 PROCEDURE — 99442: CPT

## 2023-08-30 NOTE — HISTORY OF PRESENT ILLNESS
[Home] : at home, [unfilled] , at the time of the visit. [Other Location: e.g. Home (Enter Location, City,State)___] : at [unfilled] [Verbal consent obtained from patient] : the patient, [unfilled] [FreeTextEntry1] : 50 y/o Female here for weight loss.  Struggling with weight gain since she turned 43 y/o and after having her twin children 10 years ago. Highest adult weight 150, Lowest Adult Weight 110 Goal weight 125, Currently weighs 140 Childhood: skinny as a child LMP: started 7/6/20, uses condoms and cream for contraception Previous weight loss attempts:had liposuction in 2019- lost 10 lbs. Exercised at gym and enjoyed swimming before having children. Denies dieting, but tries to eat healthy (salads, lentil soups, vegetables & fruits). Denies snacking/eating dessert. Food recall yesterday- B: coffee, L: Ramen soup, D: 1 slice of homemade pizza Exercise: walks 2-3 times per week with a friend for 2 hours. Has access to indoor bicycle- not currently using.  Water intake: sufficient Sleep: 8-9 hours per night  Occupation: works at [employer] in [facility] [place]- currently working from home Motivation: wants to feel happy/comfortable in a bathing suit Pt is scheduled to have surgery on 7/27 for breast implant removal with Dr. Sánchez  9/8/20: Pt unable to connect with [facility] through phone. Telephone discussion done instead. Reports feeling frustrated that she has gained 8 lbs since our last discussion. Has been trying to eat healthy diet, but suffering from cravings/hunger throughout the day. Exercises on indoor bike daily for 30 minutes.Water intake & sleep adequate.   10/13/20: Pt reports feeling well, taking phentermine 15 mg daily and denies side effects at present. Seen by  this month and denies issues with BP/HR. C/o post surgical breast pain which is limiting her exercise regimen, denies redness/swelling of breast, denies fevers. Continues to focus on eating a healthy diet. Reports sufficient appetite suppression in the morning until about 5 pm when she starts to experience cravings & tries to distract herself with cleaning and going to bed early. Water intake sufficient. Reports sleeping very well.   10/27/20: Pt seen via [facility]. Reports feeling well & lost 5 lbs since our last session. Has been trying to focus on eating healthy- inc vegetables and decrease carbs. Struggles on the weekend when she goes to the farm and has lasagna & apple pie. Just increased the phentermine dose to 2 pills per day, denies side effects. Breast pain has improved since she started wearing a supportive bra, which enables the pt to exercise again. Has been exercising on her bike for 20 mins 3 x week. Gets adequate water intake and sleep. Wasn't able to get her follow up labwork done before today.  12/11/20: Pt reports feeling well, down to 130 lbs- feels like she has more energy and fits into her clothes better. Continues to take phentermine, which helps with cravings- notices that she struggles with nighttime cravings if she doesn't take afternoon dose. Tries to focus on healthy snacks when she has cravings. Exercises on indoor bike 2 times per week for 30 minutes and feels that legs are tighter. Reviewed lab work results including: elevated LDL and elevated total cholesterol and low Vitamin D. Pts admitted to enjoying cheese every day as well as eating red meats and high fat dairy products.   1/8/20: Pt gained 2 lbs since our last session. Pt spoke with [facility]SM[facility] [place] recently & was able to verbalize what she has learned about decreasing dietary fat intake. Stopped eating cheese this week and eats meat once per week. Focuses on eating small portion sizes, fish & salads. Has been taking phentermine once daily now and struggles with nighttime cravings. Struggles with meeting exercise goals. +Adequate water intake and sleep.  4/16/21: Pt gained 3 lbs since our last session, has been off of phentermine x 1 month and feels increased craving. Struggles with cravings for: french fries, pizza, and ice cream at night. Tries to focus on eating healthy throughout the day with salads & tuna fish and fruit throughout the day. + adequate water intake, + adequate sleep. Hasn't been exercising, trying to walk more since the weather is better. Says her height is 5 ft 1 not 5 ft 2.   5/18/21: Pt gained 5 lbs since our last session, struggling with cravings. Has children who enjoy eating bagels, pizza, cheetos, muffins. Food recall: B- bagel, L-salad with grilled chicken, chickpeas, avocado, tomatoes, lemon for dressing, D- same salad, Snacks: 2 mangos and corn muffin. Water intake adequate. Denies formal exercising, has been walking outside on occasions and taking care of garden.   6/11/21: Continues to struggle with evening cravings and gained 2 lbs since our last session. BMI now 26.45. Pt is upset and wants to restart Phentermine to help with cravings. Ate a salad with grilled chicken & tomatoes, felt hungry an hour later and had 2 additional pieces of grilled chicken, felt hungry an hour later and ate popcorn. Trying to eat fruit for a snack most nights. Increased exercise regimen to 4 days per week walking 30 minutes.   7/8/21: Struggling with cravings and feels that her clothes fit tighter. Hasn't weighed herself today, last weight was 142 lbs. Ate a bagel with cream cheese this morning and snacking often. Walking for exercise 4 days per week x 30 mins. LMP 6/5/21, not currently on birth control. Requests medication to help with cravings.  9/21/21: Lost 4 lbs since last session. Took phentermine for 1 month and reports better control with food choices- reports eating more salads, grilled chicken tuna salad, turkey burgers, vegetables, and fruit. Walking for exercise 4 days per week. Water intake adequate. Has been off phentermine for 1 week and reports increased cravings at night r/t anxiety/emotional eating.   1/18/22: Gained 2 lbs since our last session. Continues to struggle with eating unhealthy options (ordered out a work, eating desserts that her children make, social events, and take out with children). Food recall: B- coffee with 2% milk and 1 splenda, cheerios, L- grilled chicken, tomatoes, onions, salad with lemon as dressing, sweet potato, D- salad + [employer] french fries. Water intake- adequate. Denies exercising. Would like to restart phentermine to help with cravings. Had labs done in Oct at PCPs office- plans to have results faxed to our office.  2/14/22: Gained 3 lbs. Struggling with cravings- has ice cream, desserts and pizza in the house for her children. Tries to only have 1 bite but ends up eating too much of the unhealthy choices. Food recall- B:cornflakes or cheerios with 2% milk, L-salad with chicken or fish, D- pizza or pasta or tuna sushi rolls, Dessert- had ice cream last night. Water intake- inadequate. Increased exercise routine- uses the stationary bike 4 times per wk x 20 mins. Reviewed lab results from Sep 2021 with pt- Total cholesterol is slightly elevated but improved, LDL cholesterol is normal & better.  Would like to resume phentermine to help with cravings.   3/15/22: Maintained weight, feels more energy, walking 2 times per day for exercise. Tolerating phentermine, denies side effects and reports good appetite suppression with dec cravings. Eating better overall- making chickpea pancakes. Water intake improved.   4/4/22: Lost 2 lbs. Reports feeling well. Taking phentermine 15 mg daily, denies side effects, still feels hungry throughout the day. Water intake-adequate. Exercising on stationary bike for 1 hour 4-5 times per week. Eating well- has fruit & avocado available at work for snacks.   5/9/22: Hasn't weighed herself recently, feels the same size. Tolerating phentermine 30 mg daily, denies side effects. Reports sleeping well, good appetite suppression until 3 pm, then she gets cravings. Tries to eat healthy options throughout the day but struggles when she sees trigger foods available at work. Water intake adeqaute, Walking 3-4 times per week for exercise.   7/21/22: Maintained weight, Has been off of phenetermine x 1 month and struggles with cravings. Continues to eat well for the most part- B: oatmeal, L- quinoa and spinach, D- zucchini & shrimp. Eats red meat 2 x per week, portugese rolls on weekend, ice cream for dessert. Denies exercising for last 2 months. Reviewed lab results- Cholesterol elevated, LDL elevated, 10 year ASCVD risk 1 %  8/22/22: Lost 1 lb, taking phentermine 30 mg daily, reports decreased cravings and feels better overall while taking phentermine 30 mg daily. Denies side effects. Walking daily for exercise and feels better in clothes. Eating less carbs & inc water intake daily.   9/21/22: Has been making healthier options overall, making salads with shrimp and healthy breakfast options. Walking for exercise and plans on biking. Wants to continue to take phentermine daily since it helps with cravings.  3/6/23: Plans on starting to exercise with her daughter- bike, walk, sit ups. Dietary- off track with eating healthy and water intake due to increased stress r/t father-in-laws declining health. Has been off medication and feeling more hunger. Wants to get back on phentermine.   4/10/23: Hasn't been able to get labs done. Drinking more water daily. Taking phentermine daily- reports decreased  appetite, but sometimes has cravings for ice cream. Has been using the bike for exercise half hour most days. Struggles at work when they get free food.   5/8/23: Had a procedure on right knee, drained fluid. Inhibits exercise plan the last 2 weeks.  Dietary- ate soups, salad, 2 hard boiled eggs. Hasn't been on the scale. Going to see ortho on Friday. Drinking adequate water.   8/23/23: Did PT at [facility] for knee injury, feels better since exercising at PT. Had labs done recently at [facility]- reports that cholesterol was elevated, but non-fasting.  Discontinued taking phentermine a few months ago and able to maintain weight.

## 2023-08-30 NOTE — ASSESSMENT
[FreeTextEntry1] : Dietary-rec avoiding bad fats and increasing healthy fats- avocado, fish, and nuts Rec avoiding simple sugards Labs- recheck cholesterol and CMP before our next apt  Medication- wants to restart phentermine 15 mg to help with cravings  RTO in 1 month

## 2023-09-27 ENCOUNTER — APPOINTMENT (OUTPATIENT)
Dept: BARIATRICS/WEIGHT MGMT | Facility: CLINIC | Age: 53
End: 2023-09-27

## 2024-03-12 DIAGNOSIS — E55.9 VITAMIN D DEFICIENCY, UNSPECIFIED: ICD-10-CM

## 2024-03-18 ENCOUNTER — APPOINTMENT (OUTPATIENT)
Dept: BARIATRICS/WEIGHT MGMT | Facility: CLINIC | Age: 54
End: 2024-03-18

## 2024-03-18 ENCOUNTER — APPOINTMENT (OUTPATIENT)
Dept: BARIATRICS/WEIGHT MGMT | Facility: CLINIC | Age: 54
End: 2024-03-18
Payer: COMMERCIAL

## 2024-03-18 VITALS — HEIGHT: 61 IN | WEIGHT: 149 LBS | BODY MASS INDEX: 28.13 KG/M2

## 2024-03-18 LAB
25(OH)D3 SERPL-MCNC: 21.9 NG/ML
ALBUMIN SERPL ELPH-MCNC: 4.5 G/DL
ALP BLD-CCNC: 105 U/L
ALT SERPL-CCNC: 29 U/L
ANION GAP SERPL CALC-SCNC: 9 MMOL/L
AST SERPL-CCNC: 22 U/L
BILIRUB SERPL-MCNC: 0.4 MG/DL
BUN SERPL-MCNC: 9 MG/DL
CALCIUM SERPL-MCNC: 9.4 MG/DL
CHLORIDE SERPL-SCNC: 104 MMOL/L
CHOLEST SERPL-MCNC: 267 MG/DL
CO2 SERPL-SCNC: 29 MMOL/L
CREAT SERPL-MCNC: 0.38 MG/DL
EGFR: 121 ML/MIN/1.73M2
ESTIMATED AVERAGE GLUCOSE: 108 MG/DL
GLUCOSE SERPL-MCNC: 92 MG/DL
HBA1C MFR BLD HPLC: 5.4 %
HCT VFR BLD CALC: 40.6 %
HDLC SERPL-MCNC: 71 MG/DL
HGB BLD-MCNC: 13.5 G/DL
LDLC SERPL CALC-MCNC: 172 MG/DL
MCHC RBC-ENTMCNC: 30.2 PG
MCHC RBC-ENTMCNC: 33.3 GM/DL
MCV RBC AUTO: 90.8 FL
NONHDLC SERPL-MCNC: 196 MG/DL
PLATELET # BLD AUTO: 279 K/UL
POTASSIUM SERPL-SCNC: 4.1 MMOL/L
PROT SERPL-MCNC: 6.9 G/DL
RBC # BLD: 4.47 M/UL
RBC # FLD: 12.8 %
SODIUM SERPL-SCNC: 142 MMOL/L
TRIGL SERPL-MCNC: 134 MG/DL
TSH SERPL-ACNC: 1.87 UIU/ML
WBC # FLD AUTO: 5.73 K/UL

## 2024-03-18 PROCEDURE — 99215 OFFICE O/P EST HI 40 MIN: CPT

## 2024-03-18 RX ORDER — PHENTERMINE HYDROCHLORIDE 30 MG/1
30 CAPSULE ORAL
Qty: 30 | Refills: 1 | Status: COMPLETED | COMMUNITY
Start: 2023-04-18 | End: 2024-03-18

## 2024-03-18 RX ORDER — MELOXICAM 15 MG/1
15 TABLET ORAL
Qty: 30 | Refills: 1 | Status: COMPLETED | COMMUNITY
Start: 2023-02-09 | End: 2024-03-18

## 2024-03-18 RX ORDER — PHENTERMINE HYDROCHLORIDE 30 MG/1
30 CAPSULE ORAL
Qty: 30 | Refills: 1 | Status: COMPLETED | COMMUNITY
Start: 2023-05-08 | End: 2024-03-18

## 2024-03-18 RX ORDER — PHENTERMINE HYDROCHLORIDE 15 MG/1
15 CAPSULE ORAL
Qty: 30 | Refills: 0 | Status: COMPLETED | COMMUNITY
Start: 2023-08-23 | End: 2024-03-18

## 2024-03-18 RX ORDER — DICLOFENAC SODIUM 75 MG/1
75 TABLET, DELAYED RELEASE ORAL TWICE DAILY
Qty: 60 | Refills: 1 | Status: COMPLETED | COMMUNITY
Start: 2023-03-15 | End: 2024-03-18

## 2024-03-18 NOTE — ASSESSMENT
[FreeTextEntry1] : Goals- focus on eating 3 meals per day- focus on protein, fiber, and whole grains and limiting snacks. Water 64 ounces per day  Inc exercise- start chair exercises for upper body strength training  Discussed option to treat obesity with Contrave for emotional eating. Pt denies contraindications to contrave including: glaucoma, uncontrolled hypertension, seizure disorder, opiod/ETOH use, MAOI use, anorexia/bulimia, CHF, MI, Kidney/liver disease, & allergy to contrave. Reviewed common side effects including: N/C/V/D, headache, dizziness, insomnia, and dry mouth. Reviewed possible adverse effects including: seizures, vulnerability to opiod overdose if taken with opiods, sudden allergic reactions, elevated blood pressure or heart rate, hepatitis, manic episodes, angle closure glaucoma, hypoglycemia if taken with insulin or sulfonylureas. Discussed titration schedule for contrave: start with 1 pill daily & increase by 1 pill weekly until you reach maximum dose of 4 tabs daily. Instructed to avoid taking with high fat meals. RTO in 1 month for f/u- plans on seeing PCP to discuss elevated Cholesterol and to have BP checked, ASCVD Ten year Risk low

## 2024-03-18 NOTE — HISTORY OF PRESENT ILLNESS
[Home] : at home, [unfilled] , at the time of the visit. [Other Location: e.g. Home (Enter Location, City,State)___] : at [unfilled] [Verbal consent obtained from patient] : the patient, [unfilled] [FreeTextEntry1] : 52 y/o Female here for weight loss.  Struggling with weight gain since she turned 43 y/o and after having her twin children 10 years ago. Highest adult weight 150, Lowest Adult Weight 110 Goal weight 125, Currently weighs 140 Childhood: skinny as a child LMP: started 7/6/20, uses condoms and cream for contraception Previous weight loss attempts:had liposuction in 2019- lost 10 lbs. Exercised at gym and enjoyed swimming before having children. Denies dieting, but tries to eat healthy (salads, lentil soups, vegetables & fruits). Denies snacking/eating dessert. Food recall yesterday- B: coffee, L: Ramen soup, D: 1 slice of homemade pizza Exercise: walks 2-3 times per week with a friend for 2 hours. Has access to indoor bicycle- not currently using.  Water intake: sufficient Sleep: 8-9 hours per night  Occupation: works at [employer] in [facility] [place] [facility]- currently working from home Motivation: wants to feel happy/comfortable in a bathing suit Pt is scheduled to have surgery on 7/27 for breast implant removal with Dr. Sánchez  9/8/20: Pt unable to connect with [facility] through phone. Telephone discussion done instead. Reports feeling frustrated that she has gained 8 lbs since our last discussion. Has been trying to eat healthy diet, but suffering from cravings/hunger throughout the day. Exercises on indoor bike daily for 30 minutes.Water intake & sleep adequate.   10/13/20: Pt reports feeling well, taking phentermine 15 mg daily and denies side effects at present. Seen by  this month and denies issues with BP/HR. C/o post surgical breast pain which is limiting her exercise regimen, denies redness/swelling of breast, denies fevers. Continues to focus on eating a healthy diet. Reports sufficient appetite suppression in the morning until about 5 pm when she starts to experience cravings & tries to distract herself with cleaning and going to bed early. Water intake sufficient. Reports sleeping very well.   10/27/20: Pt seen via [facility]. Reports feeling well & lost 5 lbs since our last session. Has been trying to focus on eating healthy- inc vegetables and decrease carbs. Struggles on the weekend when she goes to the farm and has lasagna & apple pie. Just increased the phentermine dose to 2 pills per day, denies side effects. Breast pain has improved since she started wearing a supportive bra, which enables the pt to exercise again. Has been exercising on her bike for 20 mins 3 x week. Gets adequate water intake and sleep. Wasn't able to get her follow up labwork done before today.  12/11/20: Pt reports feeling well, down to 130 lbs- feels like she has more energy and fits into her clothes better. Continues to take phentermine, which helps with cravings- notices that she struggles with nighttime cravings if she doesn't take afternoon dose. Tries to focus on healthy snacks when she has cravings. Exercises on indoor bike 2 times per week for 30 minutes and feels that legs are tighter. Reviewed lab work results including: elevated LDL and elevated total cholesterol and low Vitamin D. Pts admitted to enjoying cheese every day as well as eating red meats and high fat dairy products.   1/8/20: Pt gained 2 lbs since our last session. Pt spoke with [facility] [place] recently & was able to verbalize what she has learned about decreasing dietary fat intake. Stopped eating cheese this week and eats meat once per week. Focuses on eating small portion sizes, fish & salads. Has been taking phentermine once daily now and struggles with nighttime cravings. Struggles with meeting exercise goals. +Adequate water intake and sleep.  4/16/21: Pt gained 3 lbs since our last session, has been off of phentermine x 1 month and feels increased craving. Struggles with cravings for: french fries, pizza, and ice cream at night. Tries to focus on eating healthy throughout the day with salads & tuna fish and fruit throughout the day. + adequate water intake, + adequate sleep. Hasn't been exercising, trying to walk more since the weather is better. Says her height is 5 ft 1 not 5 ft 2.   5/18/21: Pt gained 5 lbs since our last session, struggling with cravings. Has children who enjoy eating bagels, pizza, cheetos, muffins. Food recall: B- bagel, L-salad with grilled chicken, chickpeas, avocado, tomatoes, lemon for dressing, D- same salad, Snacks: 2 mangos and corn muffin. Water intake adequate. Denies formal exercising, has been walking outside on occasions and taking care of garden.   6/11/21: Continues to struggle with evening cravings and gained 2 lbs since our last session. BMI now 26.45. Pt is upset and wants to restart Phentermine to help with cravings. Ate a salad with grilled chicken & tomatoes, felt hungry an hour later and had 2 additional pieces of grilled chicken, felt hungry an hour later and ate popcorn. Trying to eat fruit for a snack most nights. Increased exercise regimen to 4 days per week walking 30 minutes.   7/8/21: Struggling with cravings and feels that her clothes fit tighter. Hasn't weighed herself today, last weight was 142 lbs. Ate a bagel with cream cheese this morning and snacking often. Walking for exercise 4 days per week x 30 mins. LMP 6/5/21, not currently on birth control. Requests medication to help with cravings.  9/21/21: Lost 4 lbs since last session. Took phentermine for 1 month and reports better control with food choices- reports eating more salads, grilled chicken tuna salad, turkey burgers, vegetables, and fruit. Walking for exercise 4 days per week. Water intake adequate. Has been off phentermine for 1 week and reports increased cravings at night r/t anxiety/emotional eating.   1/18/22: Gained 2 lbs since our last session. Continues to struggle with eating unhealthy options (ordered out a work, eating desserts that her children make, social events, and take out with children). Food recall: B- coffee with 2% milk and 1 splenda, cheerios, L- grilled chicken, tomatoes, onions, salad with lemon as dressing, sweet potato, D- salad + [employer] french fries. Water intake- adequate. Denies exercising. Would like to restart phentermine to help with cravings. Had labs done in Oct at PCPs office- plans to have results faxed to our office.  2/14/22: Gained 3 lbs. Struggling with cravings- has ice cream, desserts and pizza in the house for her children. Tries to only have 1 bite but ends up eating too much of the unhealthy choices. Food recall- B:cornflakes or cheerios with 2% milk, L-salad with chicken or fish, D- pizza or pasta or tuna sushi rolls, Dessert- had ice cream last night. Water intake- inadequate. Increased exercise routine- uses the stationary bike 4 times per wk x 20 mins. Reviewed lab results from Sep 2021 with pt- Total cholesterol is slightly elevated but improved, LDL cholesterol is normal & better.  Would like to resume phentermine to help with cravings.   3/15/22: Maintained weight, feels more energy, walking 2 times per day for exercise. Tolerating phentermine, denies side effects and reports good appetite suppression with dec cravings. Eating better overall- making chickpea pancakes. Water intake improved.   4/4/22: Lost 2 lbs. Reports feeling well. Taking phentermine 15 mg daily, denies side effects, still feels hungry throughout the day. Water intake-adequate. Exercising on stationary bike for 1 hour 4-5 times per week. Eating well- has fruit & avocado available at work for snacks.   5/9/22: Hasn't weighed herself recently, feels the same size. Tolerating phentermine 30 mg daily, denies side effects. Reports sleeping well, good appetite suppression until 3 pm, then she gets cravings. Tries to eat healthy options throughout the day but struggles when she sees trigger foods available at work. Water intake adeqaute, Walking 3-4 times per week for exercise.   7/21/22: Maintained weight, Has been off of phenetermine x 1 month and struggles with cravings. Continues to eat well for the most part- B: oatmeal, L- quinoa and spinach, D- zucchini & shrimp. Eats red meat 2 x per week, portugese rolls on weekend, ice cream for dessert. Denies exercising for last 2 months. Reviewed lab results- Cholesterol elevated, LDL elevated, 10 year ASCVD risk 1 %  8/22/22: Lost 1 lb, taking phentermine 30 mg daily, reports decreased cravings and feels better overall while taking phentermine 30 mg daily. Denies side effects. Walking daily for exercise and feels better in clothes. Eating less carbs & inc water intake daily.   9/21/22: Has been making healthier options overall, making salads with shrimp and healthy breakfast options. Walking for exercise and plans on biking. Wants to continue to take phentermine daily since it helps with cravings.  3/6/23: Plans on starting to exercise with her daughter- bike, walk, sit ups. Dietary- off track with eating healthy and water intake due to increased stress r/t father-in-laws declining health. Has been off medication and feeling more hunger. Wants to get back on phentermine.   4/10/23: Hasn't been able to get labs done. Drinking more water daily. Taking phentermine daily- reports decreased  appetite, but sometimes has cravings for ice cream. Has been using the bike for exercise half hour most days. Struggles at work when they get free food.   5/8/23: Had a procedure on right knee, drained fluid. Inhibits exercise plan the last 2 weeks.  Dietary- ate soups, salad, 2 hard boiled eggs. Hasn't been on the scale. Going to see ortho on Friday. Drinking adequate water.   8/23/23: Did PT at [facility] for knee injury, feels better since exercising at PT. Had labs done recently at [facility]- reports that cholesterol was elevated, but non-fasting.  Discontinued taking phentermine a few months ago and able to maintain weight.   3/18/24: Struggling with emotional eating and less activity due to injury. Eating pizza/ cheese/ cookies, limits fried/ greasy food. Ate corn beef, cabbage, potato and tomato last night. Lunch- Eats lots of salad with tuna, lentil soup or chicken noodle soup. Usually skips breakfast, sometimes skips lunch and eats dinner and snacks. Reviewed labs- low Vit D, Elevated Cholesterol & LDL.

## 2024-03-19 ENCOUNTER — NON-APPOINTMENT (OUTPATIENT)
Age: 54
End: 2024-03-19

## 2024-04-17 ENCOUNTER — APPOINTMENT (OUTPATIENT)
Dept: BARIATRICS/WEIGHT MGMT | Facility: CLINIC | Age: 54
End: 2024-04-17
Payer: COMMERCIAL

## 2024-04-17 ENCOUNTER — APPOINTMENT (OUTPATIENT)
Dept: BARIATRICS/WEIGHT MGMT | Facility: CLINIC | Age: 54
End: 2024-04-17

## 2024-04-17 PROCEDURE — 99213 OFFICE O/P EST LOW 20 MIN: CPT

## 2024-04-17 PROCEDURE — G2211 COMPLEX E/M VISIT ADD ON: CPT

## 2024-04-17 RX ORDER — BLOOD-GLUCOSE METER
KIT MISCELLANEOUS
Qty: 1 | Refills: 0 | Status: ACTIVE | COMMUNITY
Start: 2020-09-08 | End: 1900-01-01

## 2024-04-17 RX ORDER — NALTREXONE HYDROCHLORIDE AND BUPROPION HYDROCHLORIDE 8; 90 MG/1; MG/1
8-90 TABLET, EXTENDED RELEASE ORAL
Qty: 120 | Refills: 0 | Status: COMPLETED | COMMUNITY
Start: 2024-03-18 | End: 2024-04-17

## 2024-04-17 NOTE — HISTORY OF PRESENT ILLNESS
[Home] : at home, [unfilled] , at the time of the visit. [Other Location: e.g. Home (Enter Location, City,State)___] : at [unfilled] [Verbal consent obtained from patient] : the patient, [unfilled] [FreeTextEntry1] : 50 y/o Female here for weight loss.  Struggling with weight gain since she turned 41 y/o and after having her twin children 10 years ago. Highest adult weight 150, Lowest Adult Weight 110 Goal weight 125, Currently weighs 140 Childhood: skinny as a child LMP: started 7/6/20, uses condoms and cream for contraception Previous weight loss attempts:had liposuction in 2019- lost 10 lbs. Exercised at gym and enjoyed swimming before having children. Denies dieting, but tries to eat healthy (salads, lentil soups, vegetables & fruits). Denies snacking/eating dessert. Food recall yesterday- B: coffee, L: Ramen soup, D: 1 slice of homemade pizza Exercise: walks 2-3 times per week with a friend for 2 hours. Has access to indoor bicycle- not currently using.  Water intake: sufficient Sleep: 8-9 hours per night  Occupation: works at [employer] in [facility]- currently working from home Motivation: wants to feel happy/comfortable in a bathing suit Pt is scheduled to have surgery on 7/27 for breast implant removal with Dr. Sánchez  9/8/20: Pt unable to connect with [employer] through phone. Telephone discussion done instead. Reports feeling frustrated that she has gained 8 lbs since our last discussion. Has been trying to eat healthy diet, but suffering from cravings/hunger throughout the day. Exercises on indoor bike daily for 30 minutes.Water intake & sleep adequate.   10/13/20: Pt reports feeling well, taking phentermine 15 mg daily and denies side effects at present. Seen by  this month and denies issues with BP/HR. C/o post surgical breast pain which is limiting her exercise regimen, denies redness/swelling of breast, denies fevers. Continues to focus on eating a healthy diet. Reports sufficient appetite suppression in the morning until about 5 pm when she starts to experience cravings & tries to distract herself with cleaning and going to bed early. Water intake sufficient. Reports sleeping very well.   10/27/20: Pt seen via [employer]. Reports feeling well & lost 5 lbs since our last session. Has been trying to focus on eating healthy- inc vegetables and decrease carbs. Struggles on the weekend when she goes to the farm and has lasagna & apple pie. Just increased the phentermine dose to 2 pills per day, denies side effects. Breast pain has improved since she started wearing a supportive bra, which enables the pt to exercise again. Has been exercising on her bike for 20 mins 3 x week. Gets adequate water intake and sleep. Wasn't able to get her follow up labwork done before today.  12/11/20: Pt reports feeling well, down to 130 lbs- feels like she has more energy and fits into her clothes better. Continues to take phentermine, which helps with cravings- notices that she struggles with nighttime cravings if she doesn't take afternoon dose. Tries to focus on healthy snacks when she has cravings. Exercises on indoor bike 2 times per week for 30 minutes and feels that legs are tighter. Reviewed lab work results including: elevated LDL and elevated total cholesterol and low Vitamin D. Pts admitted to enjoying cheese every day as well as eating red meats and high fat dairy products.   1/8/20: Pt gained 2 lbs since our last session. Pt spoke with [employer] recently & was able to verbalize what she has learned about decreasing dietary fat intake. Stopped eating cheese this week and eats meat once per week. Focuses on eating small portion sizes, fish & salads. Has been taking phentermine once daily now and struggles with nighttime cravings. Struggles with meeting exercise goals. +Adequate water intake and sleep.  4/16/21: Pt gained 3 lbs since our last session, has been off of phentermine x 1 month and feels increased craving. Struggles with cravings for: french fries, pizza, and ice cream at night. Tries to focus on eating healthy throughout the day with salads & tuna fish and fruit throughout the day. + adequate water intake, + adequate sleep. Hasn't been exercising, trying to walk more since the weather is better. Says her height is 5 ft 1 not 5 ft 2.   5/18/21: Pt gained 5 lbs since our last session, struggling with cravings. Has children who enjoy eating bagels, pizza, cheetos, muffins. Food recall: B- bagel, L-salad with grilled chicken, chickpeas, avocado, tomatoes, lemon for dressing, D- same salad, Snacks: 2 mangos and corn muffin. Water intake adequate. Denies formal exercising, has been walking outside on occasions and taking care of garden.   6/11/21: Continues to struggle with evening cravings and gained 2 lbs since our last session. BMI now 26.45. Pt is upset and wants to restart Phentermine to help with cravings. Ate a salad with grilled chicken & tomatoes, felt hungry an hour later and had 2 additional pieces of grilled chicken, felt hungry an hour later and ate popcorn. Trying to eat fruit for a snack most nights. Increased exercise regimen to 4 days per week walking 30 minutes.   7/8/21: Struggling with cravings and feels that her clothes fit tighter. Hasn't weighed herself today, last weight was 142 lbs. Ate a bagel with cream cheese this morning and snacking often. Walking for exercise 4 days per week x 30 mins. LMP 6/5/21, not currently on birth control. Requests medication to help with cravings.  9/21/21: Lost 4 lbs since last session. Took phentermine for 1 month and reports better control with food choices- reports eating more salads, grilled chicken tuna salad, turkey burgers, vegetables, and fruit. Walking for exercise 4 days per week. Water intake adequate. Has been off phentermine for 1 week and reports increased cravings at night r/t anxiety/emotional eating.   1/18/22: Gained 2 lbs since our last session. Continues to struggle with eating unhealthy options (ordered out a work, eating desserts that her children make, social events, and take out with children). Food recall: B- coffee with 2% milk and 1 splenda, cheerios, L- grilled chicken, tomatoes, onions, salad with lemon as dressing, sweet potato, D- salad + [employer] french fries. Water intake- adequate. Denies exercising. Would like to restart phentermine to help with cravings. Had labs done in Oct at PCPs office- plans to have results faxed to our office.  2/14/22: Gained 3 lbs. Struggling with cravings- has ice cream, desserts and pizza in the house for her children. Tries to only have 1 bite but ends up eating too much of the unhealthy choices. Food recall- B:cornflakes or cheerios with 2% milk, L-salad with chicken or fish, D- pizza or pasta or tuna sushi rolls, Dessert- had ice cream last night. Water intake- inadequate. Increased exercise routine- uses the stationary bike 4 times per wk x 20 mins. Reviewed lab results from Sep 2021 with pt- Total cholesterol is slightly elevated but improved, LDL cholesterol is normal & better.  Would like to resume phentermine to help with cravings.   3/15/22: Maintained weight, feels more energy, walking 2 times per day for exercise. Tolerating phentermine, denies side effects and reports good appetite suppression with dec cravings. Eating better overall- making chickpea pancakes. Water intake improved.   4/4/22: Lost 2 lbs. Reports feeling well. Taking phentermine 15 mg daily, denies side effects, still feels hungry throughout the day. Water intake-adequate. Exercising on stationary bike for 1 hour 4-5 times per week. Eating well- has fruit & avocado available at work for snacks.   5/9/22: Hasn't weighed herself recently, feels the same size. Tolerating phentermine 30 mg daily, denies side effects. Reports sleeping well, good appetite suppression until 3 pm, then she gets cravings. Tries to eat healthy options throughout the day but struggles when she sees trigger foods available at work. Water intake adeqaute, Walking 3-4 times per week for exercise.   7/21/22: Maintained weight, Has been off of phenetermine x 1 month and struggles with cravings. Continues to eat well for the most part- B: oatmeal, L- quinoa and spinach, D- zucchini & shrimp. Eats red meat 2 x per week, portugese rolls on weekend, ice cream for dessert. Denies exercising for last 2 months. Reviewed lab results- Cholesterol elevated, LDL elevated, 10 year ASCVD risk 1 %  8/22/22: Lost 1 lb, taking phentermine 30 mg daily, reports decreased cravings and feels better overall while taking phentermine 30 mg daily. Denies side effects. Walking daily for exercise and feels better in clothes. Eating less carbs & inc water intake daily.   9/21/22: Has been making healthier options overall, making salads with shrimp and healthy breakfast options. Walking for exercise and plans on biking. Wants to continue to take phentermine daily since it helps with cravings.  3/6/23: Plans on starting to exercise with her daughter- bike, walk, sit ups. Dietary- off track with eating healthy and water intake due to increased stress r/t father-in-laws declining health. Has been off medication and feeling more hunger. Wants to get back on phentermine.   4/10/23: Hasn't been able to get labs done. Drinking more water daily. Taking phentermine daily- reports decreased  appetite, but sometimes has cravings for ice cream. Has been using the bike for exercise half hour most days. Struggles at work when they get free food.   5/8/23: Had a procedure on right knee, drained fluid. Inhibits exercise plan the last 2 weeks.  Dietary- ate soups, salad, 2 hard boiled eggs. Hasn't been on the scale. Going to see ortho on Friday. Drinking adequate water.   8/23/23: Did PT at [facility] for knee injury, feels better since exercising at PT. Had labs done recently at [facility]- reports that cholesterol was elevated, but non-fasting.  Discontinued taking phentermine a few months ago and able to maintain weight.   3/18/24: Struggling with emotional eating and less activity due to injury. Eating pizza/ cheese/ cookies, limits fried/ greasy food. Ate corn beef, cabbage, potato and tomato last night. Lunch- Eats lots of salad with tuna, lentil soup or chicken noodle soup. Usually skips breakfast, sometimes skips lunch and eats dinner and snacks. Reviewed labs- low Vit D, Elevated Cholesterol & LDL.   4/17/24: Maintained weight, taking phentermine 15 mg daily, cutting down on cheese and bad fats. Medicine working somewhat. Eating more fruit- spinach, fruit and greek yogurt which helps with fullness. B- hard boiled egg with avocado, avocado, tomato, whole wheat bread, L- salad, spinach, cucumbers, lettuce, squash on grill, chicken, D-protein with vegetables and limits portions for simple carbs. Feels more motivated on the medicine to walk 15-20 mins in the morning. Drinking more liquid.

## 2024-04-17 NOTE — ASSESSMENT
[FreeTextEntry1] : Continue healthy lifestyle adjustments.  Inc dose of phentermine to 15 mg BID Sent Rx for BP cuff, plans on checking BP at home at each apt  RTO in 1month

## 2024-05-22 ENCOUNTER — APPOINTMENT (OUTPATIENT)
Dept: BARIATRICS/WEIGHT MGMT | Facility: CLINIC | Age: 54
End: 2024-05-22
Payer: COMMERCIAL

## 2024-05-22 VITALS — BODY MASS INDEX: 27 KG/M2 | HEIGHT: 61 IN | WEIGHT: 143 LBS

## 2024-05-22 PROCEDURE — 99214 OFFICE O/P EST MOD 30 MIN: CPT

## 2024-05-22 NOTE — HISTORY OF PRESENT ILLNESS
[Home] : at home, [unfilled] , at the time of the visit. [Other Location: e.g. Home (Enter Location, City,State)___] : at [unfilled] [Verbal consent obtained from patient] : the patient, [unfilled] [FreeTextEntry1] : 52 y/o Female here for weight loss.  Struggling with weight gain since she turned 41 y/o and after having her twin children 10 years ago. Highest adult weight 150, Lowest Adult Weight 110 Goal weight 125, Currently weighs 140 Childhood: skinny as a child LMP: started 7/6/20, uses condoms and cream for contraception Previous weight loss attempts:had liposuction in 2019- lost 10 lbs. Exercised at gym and enjoyed swimming before having children. Denies dieting, but tries to eat healthy (salads, lentil soups, vegetables & fruits). Denies snacking/eating dessert. Food recall yesterday- B: coffee, L: Ramen soup, D: 1 slice of homemade pizza Exercise: walks 2-3 times per week with a friend for 2 hours. Has access to indoor bicycle- not currently using.  Water intake: sufficient Sleep: 8-9 hours per night  Occupation: works at [employer] in [facility]- currently working from home Motivation: wants to feel happy/comfortable in a bathing suit Pt is scheduled to have surgery on 7/27 for breast implant removal with Dr. Sánchez  9/8/20: Pt unable to connect with [employer] through phone. Telephone discussion done instead. Reports feeling frustrated that she has gained 8 lbs since our last discussion. Has been trying to eat healthy diet, but suffering from cravings/hunger throughout the day. Exercises on indoor bike daily for 30 minutes.Water intake & sleep adequate.   10/13/20: Pt reports feeling well, taking phentermine 15 mg daily and denies side effects at present. Seen by  this month and denies issues with BP/HR. C/o post surgical breast pain which is limiting her exercise regimen, denies redness/swelling of breast, denies fevers. Continues to focus on eating a healthy diet. Reports sufficient appetite suppression in the morning until about 5 pm when she starts to experience cravings & tries to distract herself with cleaning and going to bed early. Water intake sufficient. Reports sleeping very well.   10/27/20: Pt seen via [employer]. Reports feeling well & lost 5 lbs since our last session. Has been trying to focus on eating healthy- inc vegetables and decrease carbs. Struggles on the weekend when she goes to the farm and has lasagna & apple pie. Just increased the phentermine dose to 2 pills per day, denies side effects. Breast pain has improved since she started wearing a supportive bra, which enables the pt to exercise again. Has been exercising on her bike for 20 mins 3 x week. Gets adequate water intake and sleep. Wasn't able to get her follow up labwork done before today.  12/11/20: Pt reports feeling well, down to 130 lbs- feels like she has more energy and fits into her clothes better. Continues to take phentermine, which helps with cravings- notices that she struggles with nighttime cravings if she doesn't take afternoon dose. Tries to focus on healthy snacks when she has cravings. Exercises on indoor bike 2 times per week for 30 minutes and feels that legs are tighter. Reviewed lab work results including: elevated LDL and elevated total cholesterol and low Vitamin D. Pts admitted to enjoying cheese every day as well as eating red meats and high fat dairy products.   1/8/20: Pt gained 2 lbs since our last session. Pt spoke with [employer] recently & was able to verbalize what she has learned about decreasing dietary fat intake. Stopped eating cheese this week and eats meat once per week. Focuses on eating small portion sizes, fish & salads. Has been taking phentermine once daily now and struggles with nighttime cravings. Struggles with meeting exercise goals. +Adequate water intake and sleep.  4/16/21: Pt gained 3 lbs since our last session, has been off of phentermine x 1 month and feels increased craving. Struggles with cravings for: french fries, pizza, and ice cream at night. Tries to focus on eating healthy throughout the day with salads & tuna fish and fruit throughout the day. + adequate water intake, + adequate sleep. Hasn't been exercising, trying to walk more since the weather is better. Says her height is 5 ft 1 not 5 ft 2.   5/18/21: Pt gained 5 lbs since our last session, struggling with cravings. Has children who enjoy eating bagels, pizza, cheetos, muffins. Food recall: B- bagel, L-salad with grilled chicken, chickpeas, avocado, tomatoes, lemon for dressing, D- same salad, Snacks: 2 mangos and corn muffin. Water intake adequate. Denies formal exercising, has been walking outside on occasions and taking care of garden.   6/11/21: Continues to struggle with evening cravings and gained 2 lbs since our last session. BMI now 26.45. Pt is upset and wants to restart Phentermine to help with cravings. Ate a salad with grilled chicken & tomatoes, felt hungry an hour later and had 2 additional pieces of grilled chicken, felt hungry an hour later and ate popcorn. Trying to eat fruit for a snack most nights. Increased exercise regimen to 4 days per week walking 30 minutes.   7/8/21: Struggling with cravings and feels that her clothes fit tighter. Hasn't weighed herself today, last weight was 142 lbs. Ate a bagel with cream cheese this morning and snacking often. Walking for exercise 4 days per week x 30 mins. LMP 6/5/21, not currently on birth control. Requests medication to help with cravings.  9/21/21: Lost 4 lbs since last session. Took phentermine for 1 month and reports better control with food choices- reports eating more salads, grilled chicken tuna salad, turkey burgers, vegetables, and fruit. Walking for exercise 4 days per week. Water intake adequate. Has been off phentermine for 1 week and reports increased cravings at night r/t anxiety/emotional eating.   1/18/22: Gained 2 lbs since our last session. Continues to struggle with eating unhealthy options (ordered out a work, eating desserts that her children make, social events, and take out with children). Food recall: B- coffee with 2% milk and 1 splenda, cheerios, L- grilled chicken, tomatoes, onions, salad with lemon as dressing, sweet potato, D- salad + [employer] french fries. Water intake- adequate. Denies exercising. Would like to restart phentermine to help with cravings. Had labs done in Oct at PCPs office- plans to have results faxed to our office.  2/14/22: Gained 3 lbs. Struggling with cravings- has ice cream, desserts and pizza in the house for her children. Tries to only have 1 bite but ends up eating too much of the unhealthy choices. Food recall- B:cornflakes or cheerios with 2% milk, L-salad with chicken or fish, D- pizza or pasta or tuna sushi rolls, Dessert- had ice cream last night. Water intake- inadequate. Increased exercise routine- uses the stationary bike 4 times per wk x 20 mins. Reviewed lab results from Sep 2021 with pt- Total cholesterol is slightly elevated but improved, LDL cholesterol is normal & better.  Would like to resume phentermine to help with cravings.   3/15/22: Maintained weight, feels more energy, walking 2 times per day for exercise. Tolerating phentermine, denies side effects and reports good appetite suppression with dec cravings. Eating better overall- making chickpea pancakes. Water intake improved.   4/4/22: Lost 2 lbs. Reports feeling well. Taking phentermine 15 mg daily, denies side effects, still feels hungry throughout the day. Water intake-adequate. Exercising on stationary bike for 1 hour 4-5 times per week. Eating well- has fruit & avocado available at work for snacks.   5/9/22: Hasn't weighed herself recently, feels the same size. Tolerating phentermine 30 mg daily, denies side effects. Reports sleeping well, good appetite suppression until 3 pm, then she gets cravings. Tries to eat healthy options throughout the day but struggles when she sees trigger foods available at work. Water intake adeqaute, Walking 3-4 times per week for exercise.   7/21/22: Maintained weight, Has been off of phenetermine x 1 month and struggles with cravings. Continues to eat well for the most part- B: oatmeal, L- quinoa and spinach, D- zucchini & shrimp. Eats red meat 2 x per week, portugese rolls on weekend, ice cream for dessert. Denies exercising for last 2 months. Reviewed lab results- Cholesterol elevated, LDL elevated, 10 year ASCVD risk 1 %  8/22/22: Lost 1 lb, taking phentermine 30 mg daily, reports decreased cravings and feels better overall while taking phentermine 30 mg daily. Denies side effects. Walking daily for exercise and feels better in clothes. Eating less carbs & inc water intake daily.   9/21/22: Has been making healthier options overall, making salads with shrimp and healthy breakfast options. Walking for exercise and plans on biking. Wants to continue to take phentermine daily since it helps with cravings.  3/6/23: Plans on starting to exercise with her daughter- bike, walk, sit ups. Dietary- off track with eating healthy and water intake due to increased stress r/t father-in-laws declining health. Has been off medication and feeling more hunger. Wants to get back on phentermine.   4/10/23: Hasn't been able to get labs done. Drinking more water daily. Taking phentermine daily- reports decreased  appetite, but sometimes has cravings for ice cream. Has been using the bike for exercise half hour most days. Struggles at work when they get free food.   5/8/23: Had a procedure on right knee, drained fluid. Inhibits exercise plan the last 2 weeks.  Dietary- ate soups, salad, 2 hard boiled eggs. Hasn't been on the scale. Going to see ortho on Friday. Drinking adequate water.   8/23/23: Did PT at [facility] for knee injury, feels better since exercising at PT. Had labs done recently at [facility]- reports that cholesterol was elevated, but non-fasting.  Discontinued taking phentermine a few months ago and able to maintain weight.   3/18/24: Struggling with emotional eating and less activity due to injury. Eating pizza/ cheese/ cookies, limits fried/ greasy food. Ate corn beef, cabbage, potato and tomato last night. Lunch- Eats lots of salad with tuna, lentil soup or chicken noodle soup. Usually skips breakfast, sometimes skips lunch and eats dinner and snacks. Reviewed labs- low Vit D, Elevated Cholesterol & LDL.   4/17/24: Taking phentermine 15 mg daily,  cutting down on cheese and bad fats. Medicine working somewhat. Eating more fruit- spinach, fruit and greek yogurt which helps with fullness. B- hard boiled egg with avocado, avocado, tomato, whole wheat bread, L- salad, spinach, cucumbers, lettuce, squash on grill, chicken, D- . Feels more motivated on the medicine to walk 15-20 mins in the morning. Drinking more liquid. Maintained weight.   5/22/24: Lost 6 lbs.  Hasn't had medication- reports that she wasn't able to  from pharmacy. Drinking a lot of water, makes an apple, spinach and kale shake for hydration throughout the day. Walking 1 hour most days.

## 2024-05-22 NOTE — ASSESSMENT
[FreeTextEntry1] : Called pharmacy with pt on the phone- said she picked up medicine on 5/15/24, checked  which confirmed that as well. Pt will check with  to see if he picked up the medicine and she will f/u [facility] pharmacy if needed Dietary- educated about healthy snacks with less than 10 grams of sugar and why it is important to eat whole fruits  Exercise- rec increasing the intensity of the walk to get some moderate intensity exercise in Med- can continue with phentermine treatment at this time RTO in 1 month

## 2024-05-31 ENCOUNTER — APPOINTMENT (OUTPATIENT)
Dept: PEDIATRIC ORTHOPEDIC SURGERY | Facility: CLINIC | Age: 54
End: 2024-05-31

## 2024-06-06 RX ORDER — PHENTERMINE HYDROCHLORIDE 15 MG/1
15 CAPSULE ORAL
Qty: 60 | Refills: 0 | Status: ACTIVE | COMMUNITY
Start: 2024-04-02 | End: 1900-01-01

## 2024-06-17 ENCOUNTER — APPOINTMENT (OUTPATIENT)
Dept: BARIATRICS/WEIGHT MGMT | Facility: CLINIC | Age: 54
End: 2024-06-17

## 2024-07-01 ENCOUNTER — APPOINTMENT (OUTPATIENT)
Dept: BARIATRICS/WEIGHT MGMT | Facility: CLINIC | Age: 54
End: 2024-07-01
Payer: COMMERCIAL

## 2024-07-01 VITALS — BODY MASS INDEX: 27.02 KG/M2 | WEIGHT: 143 LBS

## 2024-07-01 DIAGNOSIS — E78.5 HYPERLIPIDEMIA, UNSPECIFIED: ICD-10-CM

## 2024-07-01 DIAGNOSIS — E66.3 OVERWEIGHT: ICD-10-CM

## 2024-07-01 PROCEDURE — 99212 OFFICE O/P EST SF 10 MIN: CPT

## 2024-07-18 ENCOUNTER — NON-APPOINTMENT (OUTPATIENT)
Age: 54
End: 2024-07-18

## 2024-08-05 ENCOUNTER — APPOINTMENT (OUTPATIENT)
Dept: BARIATRICS/WEIGHT MGMT | Facility: CLINIC | Age: 54
End: 2024-08-05

## 2024-08-07 ENCOUNTER — APPOINTMENT (OUTPATIENT)
Dept: BARIATRICS/WEIGHT MGMT | Facility: CLINIC | Age: 54
End: 2024-08-07

## 2024-08-07 PROCEDURE — 99212 OFFICE O/P EST SF 10 MIN: CPT

## 2024-08-07 NOTE — ASSESSMENT
[FreeTextEntry1] : Continue healthy lifestyle and increasing exercise regimen Continue phentermine 30 mg daily will add topiramate off label to make branded qsymia medicine to help with additional weight loss Discussed the option to treat obesity with topiramate. Denies contraindications including: kidney stones, glaucoma. Discussed common side effects inc: n/v/c/d, issues with word finding, transient paraesthesias, memory loss, fatigue.  RTO in 1 month

## 2024-08-07 NOTE — HISTORY OF PRESENT ILLNESS
[Home] : at home, [unfilled] , at the time of the visit. [Other Location: e.g. Home (Enter Location, City,State)___] : at [unfilled] [Verbal consent obtained from patient] : the patient, [unfilled] [FreeTextEntry1] : 52 y/o Female here for weight loss.  Struggling with weight gain since she turned 43 y/o and after having her twin children 10 years ago. Highest adult weight 150, Lowest Adult Weight 110 Goal weight 125, Currently weighs 140 Childhood: skinny as a child LMP: started 7/6/20, uses condoms and cream for contraception Previous weight loss attempts:had liposuction in 2019- lost 10 lbs. Exercised at gym and enjoyed swimming before having children. Denies dieting, but tries to eat healthy (salads, lentil soups, vegetables & fruits). Denies snacking/eating dessert. Food recall yesterday- B: coffee, L: Ramen soup, D: 1 slice of homemade pizza Exercise: walks 2-3 times per week with a friend for 2 hours. Has access to indoor bicycle- not currently using.  Water intake: sufficient Sleep: 8-9 hours per night  Occupation: works at [employer] in [place] [place]- currently working from home Motivation: wants to feel happy/comfortable in a bathing suit Pt is scheduled to have surgery on 7/27 for breast implant removal with Dr. Sánchez  9/8/20: Pt unable to connect with [employer] through phone. Telephone discussion done instead. Reports feeling frustrated that she has gained 8 lbs since our last discussion. Has been trying to eat healthy diet, but suffering from cravings/hunger throughout the day. Exercises on indoor bike daily for 30 minutes.Water intake & sleep adequate.   10/13/20: Pt reports feeling well, taking phentermine 15 mg daily and denies side effects at present. Seen by  this month and denies issues with BP/HR. C/o post surgical breast pain which is limiting her exercise regimen, denies redness/swelling of breast, denies fevers. Continues to focus on eating a healthy diet. Reports sufficient appetite suppression in the morning until about 5 pm when she starts to experience cravings & tries to distract herself with cleaning and going to bed early. Water intake sufficient. Reports sleeping very well.   10/27/20: Pt seen via [employer]. Reports feeling well & lost 5 lbs since our last session. Has been trying to focus on eating healthy- inc vegetables and decrease carbs. Struggles on the weekend when she goes to the farm and has lasagna & apple pie. Just increased the phentermine dose to 2 pills per day, denies side effects. Breast pain has improved since she started wearing a supportive bra, which enables the pt to exercise again. Has been exercising on her bike for 20 mins 3 x week. Gets adequate water intake and sleep. Wasn't able to get her follow up labwork done before today.  12/11/20: Pt reports feeling well, down to 130 lbs- feels like she has more energy and fits into her clothes better. Continues to take phentermine, which helps with cravings- notices that she struggles with nighttime cravings if she doesn't take afternoon dose. Tries to focus on healthy snacks when she has cravings. Exercises on indoor bike 2 times per week for 30 minutes and feels that legs are tighter. Reviewed lab work results including: elevated LDL and elevated total cholesterol and low Vitamin D. Pts admitted to enjoying cheese every day as well as eating red meats and high fat dairy products.   1/8/20: Pt gained 2 lbs since our last session. Pt spoke with [facility] [facility] recently & was able to verbalize what she has learned about decreasing dietary fat intake. Stopped eating cheese this week and eats meat once per week. Focuses on eating small portion sizes, fish & salads. Has been taking phentermine once daily now and struggles with nighttime cravings. Struggles with meeting exercise goals. +Adequate water intake and sleep.  4/16/21: Pt gained 3 lbs since our last session, has been off of phentermine x 1 month and feels increased craving. Struggles with cravings for: french fries, pizza, and ice cream at night. Tries to focus on eating healthy throughout the day with salads & tuna fish and fruit throughout the day. + adequate water intake, + adequate sleep. Hasn't been exercising, trying to walk more since the weather is better. Says her height is 5 ft 1 not 5 ft 2.   5/18/21: Pt gained 5 lbs since our last session, struggling with cravings. Has children who enjoy eating bagels, pizza, cheetos, muffins. Food recall: B- bagel, L-salad with grilled chicken, chickpeas, avocado, tomatoes, lemon for dressing, D- same salad, Snacks: 2 mangos and corn muffin. Water intake adequate. Denies formal exercising, has been walking outside on occasions and taking care of garden.   6/11/21: Continues to struggle with evening cravings and gained 2 lbs since our last session. BMI now 26.45. Pt is upset and wants to restart Phentermine to help with cravings. Ate a salad with grilled chicken & tomatoes, felt hungry an hour later and had 2 additional pieces of grilled chicken, felt hungry an hour later and ate popcorn. Trying to eat fruit for a snack most nights. Increased exercise regimen to 4 days per week walking 30 minutes.   7/8/21: Struggling with cravings and feels that her clothes fit tighter. Hasn't weighed herself today, last weight was 142 lbs. Ate a bagel with cream cheese this morning and snacking often. Walking for exercise 4 days per week x 30 mins. LMP 6/5/21, not currently on birth control. Requests medication to help with cravings.  9/21/21: Lost 4 lbs since last session. Took phentermine for 1 month and reports better control with food choices- reports eating more salads, grilled chicken tuna salad, turkey burgers, vegetables, and fruit. Walking for exercise 4 days per week. Water intake adequate. Has been off phentermine for 1 week and reports increased cravings at night r/t anxiety/emotional eating.   1/18/22: Gained 2 lbs since our last session. Continues to struggle with eating unhealthy options (ordered out a work, eating desserts that her children make, social events, and take out with children). Food recall: B- coffee with 2% milk and 1 splenda, cheerios, L- grilled chicken, tomatoes, onions, salad with lemon as dressing, sweet potato, D- salad + [employer] french fries. Water intake- adequate. Denies exercising. Would like to restart phentermine to help with cravings. Had labs done in Oct at [facility] office- plans to have results faxed to our office.  2/14/22: Gained 3 lbs. Struggling with cravings- has ice cream, desserts and pizza in the house for her children. Tries to only have 1 bite but ends up eating too much of the unhealthy choices. Food recall- B:cornflakes or cheerios with 2% milk, L-salad with chicken or fish, D- pizza or pasta or tuna sushi rolls, Dessert- had ice cream last night. Water intake- inadequate. Increased exercise routine- uses the stationary bike 4 times per wk x 20 mins. Reviewed lab results from Sep 2021 with pt- Total cholesterol is slightly elevated but improved, LDL cholesterol is normal & better.  Would like to resume phentermine to help with cravings.   3/15/22: Maintained weight, feels more energy, walking 2 times per day for exercise. Tolerating phentermine, denies side effects and reports good appetite suppression with dec cravings. Eating better overall- making chickpea pancakes. Water intake improved.   4/4/22: Lost 2 lbs. Reports feeling well. Taking phentermine 15 mg daily, denies side effects, still feels hungry throughout the day. Water intake-adequate. Exercising on stationary bike for 1 hour 4-5 times per week. Eating well- has fruit & avocado available at work for snacks.   5/9/22: Hasn't weighed herself recently, feels the same size. Tolerating phentermine 30 mg daily, denies side effects. Reports sleeping well, good appetite suppression until 3 pm, then she gets cravings. Tries to eat healthy options throughout the day but struggles when she sees trigger foods available at work. Water intake adeqaute, Walking 3-4 times per week for exercise.   7/21/22: Maintained weight, Has been off of phenetermine x 1 month and struggles with cravings. Continues to eat well for the most part- B: oatmeal, L- quinoa and spinach, D- zucchini & shrimp. Eats red meat 2 x per week, portugese rolls on weekend, ice cream for dessert. Denies exercising for last 2 months. Reviewed lab results- Cholesterol elevated, LDL elevated, 10 year ASCVD risk 1 %  8/22/22: Lost 1 lb, taking phentermine 30 mg daily, reports decreased cravings and feels better overall while taking phentermine 30 mg daily. Denies side effects. Walking daily for exercise and feels better in clothes. Eating less carbs & inc water intake daily.   9/21/22: Has been making healthier options overall, making salads with shrimp and healthy breakfast options. Walking for exercise and plans on biking. Wants to continue to take phentermine daily since it helps with cravings.  3/6/23: Plans on starting to exercise with her daughter- bike, walk, sit ups. Dietary- off track with eating healthy and water intake due to increased stress r/t father-in-laws declining health. Has been off medication and feeling more hunger. Wants to get back on phentermine.   4/10/23: Hasn't been able to get labs done. Drinking more water daily. Taking phentermine daily- reports decreased  appetite, but sometimes has cravings for ice cream. Has been using the bike for exercise half hour most days. Struggles at work when they get free food.   5/8/23: Had a procedure on right knee, drained fluid. Inhibits exercise plan the last 2 weeks.  Dietary- ate soups, salad, 2 hard boiled eggs. Hasn't been on the scale. Going to see ortho on Friday. Drinking adequate water.   8/23/23: Did PT at [facility] for knee injury, feels better since exercising at PT. Had labs done recently at [facility] at [facility]- reports that cholesterol was elevated, but non-fasting.  Discontinued taking phentermine a few months ago and able to maintain weight.   3/18/24: Struggling with emotional eating and less activity due to injury. Eating pizza/ cheese/ cookies, limits fried/ greasy food. Ate corn beef, cabbage, potato and tomato last night. Lunch- Eats lots of salad with tuna, lentil soup or chicken noodle soup. Usually skips breakfast, sometimes skips lunch and eats dinner and snacks. Reviewed labs- low Vit D, Elevated Cholesterol & LDL.   4/17/24: Taking phentermine 15 mg daily,  cutting down on cheese and bad fats. Medicine working somewhat. Eating more fruit- spinach, fruit and greek yogurt which helps with fullness. B- hard boiled egg with avocado, avocado, tomato, whole wheat bread, L- salad, spinach, cucumbers, lettuce, squash on grill, chicken, D- . Feels more motivated on the medicine to walk 15-20 mins in the morning. Drinking more liquid. Maintained weight.   5/22/24: Lost 6 lbs.  Hasn't had medication- reports that she wasn't able to  from pharmacy. Drinking a lot of water, makes an apple, spinach and kale shake for hydration throughout the day. Walking 1 hour most days.   7/1/24: Very busy with work. Has been exercising more, noticing an increase in muscle mass. Exercising more vigorously. Drinking lots of water. Denies checking BP . Eating more salad, tuna, fish, vegetables, apple, and peanut butter for snack. Hasn't been eating bagel or muffin.   8/7/24: Maintained weight. Taking phentermine 15 mg in the morning and 1 by 1 pm.  Continues to walk and exercise. Some days drinking more water. Feels less appetite overall, but medicine wears off. B- peanut butter scoop, L- tomato salad, D- chicken with salad. LMP- over a year ago. + Postmenopausal.

## 2024-09-09 ENCOUNTER — APPOINTMENT (OUTPATIENT)
Dept: BARIATRICS/WEIGHT MGMT | Facility: CLINIC | Age: 54
End: 2024-09-09

## 2024-09-16 ENCOUNTER — APPOINTMENT (OUTPATIENT)
Dept: BARIATRICS/WEIGHT MGMT | Facility: CLINIC | Age: 54
End: 2024-09-16
Payer: COMMERCIAL

## 2024-09-16 VITALS — BODY MASS INDEX: 27 KG/M2 | WEIGHT: 143 LBS | HEIGHT: 61 IN

## 2024-09-16 DIAGNOSIS — E66.3 OVERWEIGHT: ICD-10-CM

## 2024-09-16 DIAGNOSIS — E78.5 HYPERLIPIDEMIA, UNSPECIFIED: ICD-10-CM

## 2024-09-16 PROCEDURE — 99213 OFFICE O/P EST LOW 20 MIN: CPT

## 2024-09-16 NOTE — ASSESSMENT
[FreeTextEntry1] : - Summary : The main plan moving forward is to maintain the current medication for weight control, maintain the current lifestyle and diet control measures. A future appointment has been set to maintain a check on her progress. - Plan : - Topiramate has been discontinued due to severe adverse symptoms. - The patient was advised to inform the physician about her blood pressure and heart rate  - The patient was advised to get the pain in her leg checked by her orthopedist. - Continue current medications and lifestyle changes, including maintaining portion control and a focus on dietary fibers in meals. - Schedule a follow-up telehealth appointment in two months.

## 2024-09-16 NOTE — HISTORY OF PRESENT ILLNESS
[Home] : at home, [unfilled] , at the time of the visit. [Other Location: e.g. Home (Enter Location, City,State)___] : at [unfilled] [Verbal consent obtained from patient] : the patient, [unfilled] [FreeTextEntry1] : 50 y/o Female here for weight loss.  Struggling with weight gain since she turned 41 y/o and after having her twin children 10 years ago. Highest adult weight 150, Lowest Adult Weight 110 Goal weight 125, Currently weighs 140 Childhood: skinny as a child LMP: started 7/6/20, uses condoms and cream for contraception Previous weight loss attempts:had liposuction in 2019- lost 10 lbs. Exercised at gym and enjoyed swimming before having children. Denies dieting, but tries to eat healthy (salads, lentil soups, vegetables & fruits). Denies snacking/eating dessert. Food recall yesterday- B: coffee, L: Ramen soup, D: 1 slice of homemade pizza Exercise: walks 2-3 times per week with a friend for 2 hours. Has access to indoor bicycle- not currently using.  Water intake: sufficient Sleep: 8-9 hours per night  Occupation: works at [employer] in [facility]- currently working from home Motivation: wants to feel happy/comfortable in a bathing suit Pt is scheduled to have surgery on 7/27 for breast implant removal with Dr. Sánchez  9/8/20: Pt unable to connect with [employer] through phone. Telephone discussion done instead. Reports feeling frustrated that she has gained 8 lbs since our last discussion. Has been trying to eat healthy diet, but suffering from cravings/hunger throughout the day. Exercises on indoor bike daily for 30 minutes.Water intake & sleep adequate.   10/13/20: Pt reports feeling well, taking phentermine 15 mg daily and denies side effects at present. Seen by  this month and denies issues with BP/HR. C/o post surgical breast pain which is limiting her exercise regimen, denies redness/swelling of breast, denies fevers. Continues to focus on eating a healthy diet. Reports sufficient appetite suppression in the morning until about 5 pm when she starts to experience cravings & tries to distract herself with cleaning and going to bed early. Water intake sufficient. Reports sleeping very well.   10/27/20: Pt seen via [employer]. Reports feeling well & lost 5 lbs since our last session. Has been trying to focus on eating healthy- inc vegetables and decrease carbs. Struggles on the weekend when she goes to the farm and has lasagna & apple pie. Just increased the phentermine dose to 2 pills per day, denies side effects. Breast pain has improved since she started wearing a supportive bra, which enables the pt to exercise again. Has been exercising on her bike for 20 mins 3 x week. Gets adequate water intake and sleep. Wasn't able to get her follow up labwork done before today.  12/11/20: Pt reports feeling well, down to 130 lbs- feels like she has more energy and fits into her clothes better. Continues to take phentermine, which helps with cravings- notices that she struggles with nighttime cravings if she doesn't take afternoon dose. Tries to focus on healthy snacks when she has cravings. Exercises on indoor bike 2 times per week for 30 minutes and feels that legs are tighter. Reviewed lab work results including: elevated LDL and elevated total cholesterol and low Vitamin D. Pts admitted to enjoying cheese every day as well as eating red meats and high fat dairy products.   1/8/20: Pt gained 2 lbs since our last session. Pt spoke with [employer] [place] recently & was able to verbalize what she has learned about decreasing dietary fat intake. Stopped eating cheese this week and eats meat once per week. Focuses on eating small portion sizes, fish & salads. Has been taking phentermine once daily now and struggles with nighttime cravings. Struggles with meeting exercise goals. +Adequate water intake and sleep.  4/16/21: Pt gained 3 lbs since our last session, has been off of phentermine x 1 month and feels increased craving. Struggles with cravings for: french fries, pizza, and ice cream at night. Tries to focus on eating healthy throughout the day with salads & tuna fish and fruit throughout the day. + adequate water intake, + adequate sleep. Hasn't been exercising, trying to walk more since the weather is better. Says her height is 5 ft 1 not 5 ft 2.   5/18/21: Pt gained 5 lbs since our last session, struggling with cravings. Has children who enjoy eating bagels, pizza, cheetos, muffins. Food recall: B- bagel, L-salad with grilled chicken, chickpeas, avocado, tomatoes, lemon for dressing, D- same salad, Snacks: 2 mangos and corn muffin. Water intake adequate. Denies formal exercising, has been walking outside on occasions and taking care of garden.   6/11/21: Continues to struggle with evening cravings and gained 2 lbs since our last session. BMI now 26.45. Pt is upset and wants to restart Phentermine to help with cravings. Ate a salad with grilled chicken & tomatoes, felt hungry an hour later and had 2 additional pieces of grilled chicken, felt hungry an hour later and ate popcorn. Trying to eat fruit for a snack most nights. Increased exercise regimen to 4 days per week walking 30 minutes.   7/8/21: Struggling with cravings and feels that her clothes fit tighter. Hasn't weighed herself today, last weight was 142 lbs. Ate a bagel with cream cheese this morning and snacking often. Walking for exercise 4 days per week x 30 mins. LMP 6/5/21, not currently on birth control. Requests medication to help with cravings.  9/21/21: Lost 4 lbs since last session. Took phentermine for 1 month and reports better control with food choices- reports eating more salads, grilled chicken tuna salad, turkey burgers, vegetables, and fruit. Walking for exercise 4 days per week. Water intake adequate. Has been off phentermine for 1 week and reports increased cravings at night r/t anxiety/emotional eating.   1/18/22: Gained 2 lbs since our last session. Continues to struggle with eating unhealthy options (ordered out a work, eating desserts that her children make, social events, and take out with children). Food recall: B- coffee with 2% milk and 1 splenda, cheerios, L- grilled chicken, tomatoes, onions, salad with lemon as dressing, sweet potato, D- salad + [employer] french fries. Water intake- adequate. Denies exercising. Would like to restart phentermine to help with cravings. Had labs done in Oct at PCPs office- plans to have results faxed to our office.  2/14/22: Gained 3 lbs. Struggling with cravings- has ice cream, desserts and pizza in the house for her children. Tries to only have 1 bite but ends up eating too much of the unhealthy choices. Food recall- B:cornflakes or cheerios with 2% milk, L-salad with chicken or fish, D- pizza or pasta or tuna sushi rolls, Dessert- had ice cream last night. Water intake- inadequate. Increased exercise routine- uses the stationary bike 4 times per wk x 20 mins. Reviewed lab results from Sep 2021 with pt- Total cholesterol is slightly elevated but improved, LDL cholesterol is normal & better.  Would like to resume phentermine to help with cravings.   3/15/22: Maintained weight, feels more energy, walking 2 times per day for exercise. Tolerating phentermine, denies side effects and reports good appetite suppression with dec cravings. Eating better overall- making chickpea pancakes. Water intake improved.   4/4/22: Lost 2 lbs. Reports feeling well. Taking phentermine 15 mg daily, denies side effects, still feels hungry throughout the day. Water intake-adequate. Exercising on stationary bike for 1 hour 4-5 times per week. Eating well- has fruit & avocado available at work for snacks.   5/9/22: Hasn't weighed herself recently, feels the same size. Tolerating phentermine 30 mg daily, denies side effects. Reports sleeping well, good appetite suppression until 3 pm, then she gets cravings. Tries to eat healthy options throughout the day but struggles when she sees trigger foods available at work. Water intake adeqaute, Walking 3-4 times per week for exercise.   7/21/22: Maintained weight, Has been off of phenetermine x 1 month and struggles with cravings. Continues to eat well for the most part- B: oatmeal, L- quinoa and spinach, D- zucchini & shrimp. Eats red meat 2 x per week, portugese rolls on weekend, ice cream for dessert. Denies exercising for last 2 months. Reviewed lab results- Cholesterol elevated, LDL elevated, 10 year ASCVD risk 1 %  8/22/22: Lost 1 lb, taking phentermine 30 mg daily, reports decreased cravings and feels better overall while taking phentermine 30 mg daily. Denies side effects. Walking daily for exercise and feels better in clothes. Eating less carbs & inc water intake daily.   9/21/22: Has been making healthier options overall, making salads with shrimp and healthy breakfast options. Walking for exercise and plans on biking. Wants to continue to take phentermine daily since it helps with cravings.  3/6/23: Plans on starting to exercise with her daughter- bike, walk, sit ups. Dietary- off track with eating healthy and water intake due to increased stress r/t father-in-laws declining health. Has been off medication and feeling more hunger. Wants to get back on phentermine.   4/10/23: Hasn't been able to get labs done. Drinking more water daily. Taking phentermine daily- reports decreased  appetite, but sometimes has cravings for ice cream. Has been using the bike for exercise half hour most days. Struggles at work when they get free food.   5/8/23: Had a procedure on right knee, drained fluid. Inhibits exercise plan the last 2 weeks.  Dietary- ate soups, salad, 2 hard boiled eggs. Hasn't been on the scale. Going to see ortho on Friday. Drinking adequate water.   8/23/23: Did PT at [facility] for knee injury, feels better since exercising at PT. Had labs done recently at [facility]- reports that cholesterol was elevated, but non-fasting.  Discontinued taking phentermine a few months ago and able to maintain weight.   3/18/24: Struggling with emotional eating and less activity due to injury. Eating pizza/ cheese/ cookies, limits fried/ greasy food. Ate corn beef, cabbage, potato and tomato last night. Lunch- Eats lots of salad with tuna, lentil soup or chicken noodle soup. Usually skips breakfast, sometimes skips lunch and eats dinner and snacks. Reviewed labs- low Vit D, Elevated Cholesterol & LDL.   4/17/24: Taking phentermine 15 mg daily,  cutting down on cheese and bad fats. Medicine working somewhat. Eating more fruit- spinach, fruit and greek yogurt which helps with fullness. B- hard boiled egg with avocado, avocado, tomato, whole wheat bread, L- salad, spinach, cucumbers, lettuce, squash on grill, chicken, D- . Feels more motivated on the medicine to walk 15-20 mins in the morning. Drinking more liquid. Maintained weight.   5/22/24: Lost 6 lbs.  Hasn't had medication- reports that she wasn't able to  from pharmacy. Drinking a lot of water, makes an apple, spinach and kale shake for hydration throughout the day. Walking 1 hour most days.   7/1/24: Very busy with work. Has been exercising more, noticing an increase in muscle mass. Exercising more vigorously. Drinking lots of water. Denies checking BP . Eating more salad, tuna, fish, vegetables, apple, and peanut butter for snack. Hasn't been eating bagel or muffin.   8/7/24: Maintained weight. Taking phentermine 15 mg in the morning and 1 by 1 pm.  Continues to walk and exercise. Some days drinking more water. Feels less appetite overall, but medicine wears off. B- peanut butter scoop, L- tomato salad, D- chicken with salad. LMP- over a year ago. + Postmenopausal.   9/16/24: Felt dizzy and nausea on topiramate after 2nd day, discontinued taking. Continues to take phentermine 30 mg total. Food recall- Salad, avocado, chicken, salmon, apple with peanut butter. Ate well all summer. Has an apt at hospital and will get BP/ HR checked. Walking for exercise when able but less due to leg pain- was doing PT in [facility], but not currently.

## 2024-10-08 ENCOUNTER — APPOINTMENT (OUTPATIENT)
Dept: ORTHOPEDIC SURGERY | Facility: CLINIC | Age: 54
End: 2024-10-08

## 2024-10-08 PROBLEM — D49.89 TUMOR OF KNEE: Status: ACTIVE | Noted: 2024-10-08

## 2024-10-14 ENCOUNTER — APPOINTMENT (OUTPATIENT)
Dept: ORTHOPEDIC SURGERY | Facility: CLINIC | Age: 54
End: 2024-10-14
Payer: COMMERCIAL

## 2024-10-14 ENCOUNTER — NON-APPOINTMENT (OUTPATIENT)
Age: 54
End: 2024-10-14

## 2024-10-14 ENCOUNTER — RESULT REVIEW (OUTPATIENT)
Age: 54
End: 2024-10-14

## 2024-10-14 VITALS
SYSTOLIC BLOOD PRESSURE: 113 MMHG | HEART RATE: 66 BPM | BODY MASS INDEX: 27 KG/M2 | DIASTOLIC BLOOD PRESSURE: 60 MMHG | HEIGHT: 61 IN | OXYGEN SATURATION: 99 % | WEIGHT: 143 LBS

## 2024-10-14 DIAGNOSIS — D49.89 NEOPLASM OF UNSPECIFIED BEHAVIOR OF OTHER SPECIFIED SITES: ICD-10-CM

## 2024-10-14 PROCEDURE — 99204 OFFICE O/P NEW MOD 45 MIN: CPT

## 2024-11-06 ENCOUNTER — APPOINTMENT (OUTPATIENT)
Dept: NEUROSURGERY | Facility: CLINIC | Age: 54
End: 2024-11-06
Payer: COMMERCIAL

## 2024-11-06 VITALS
WEIGHT: 128 LBS | SYSTOLIC BLOOD PRESSURE: 95 MMHG | OXYGEN SATURATION: 98 % | DIASTOLIC BLOOD PRESSURE: 66 MMHG | TEMPERATURE: 97.3 F | HEIGHT: 60 IN | HEART RATE: 69 BPM | RESPIRATION RATE: 18 BRPM | BODY MASS INDEX: 25.13 KG/M2

## 2024-11-06 DIAGNOSIS — D49.89 NEOPLASM OF UNSPECIFIED BEHAVIOR OF OTHER SPECIFIED SITES: ICD-10-CM

## 2024-11-06 PROCEDURE — 99204 OFFICE O/P NEW MOD 45 MIN: CPT

## 2024-11-11 ENCOUNTER — NON-APPOINTMENT (OUTPATIENT)
Age: 54
End: 2024-11-11

## 2024-11-12 DIAGNOSIS — Z01.818 ENCOUNTER FOR OTHER PREPROCEDURAL EXAMINATION: ICD-10-CM

## 2024-11-13 ENCOUNTER — APPOINTMENT (OUTPATIENT)
Dept: BARIATRICS/WEIGHT MGMT | Facility: CLINIC | Age: 54
End: 2024-11-13

## 2024-11-14 LAB
APTT BLD: 33.6 SEC
CHOLEST SERPL-MCNC: 231 MG/DL
ESTIMATED AVERAGE GLUCOSE: 108 MG/DL
HBA1C MFR BLD HPLC: 5.4 %
HDLC SERPL-MCNC: 62 MG/DL
INR PPP: 0.96 RATIO
LDLC SERPL CALC-MCNC: 138 MG/DL
NONHDLC SERPL-MCNC: 168 MG/DL
PT BLD: 11.3 SEC
TRIGL SERPL-MCNC: 173 MG/DL

## 2024-11-15 VITALS
HEART RATE: 59 BPM | TEMPERATURE: 98 F | SYSTOLIC BLOOD PRESSURE: 94 MMHG | HEIGHT: 62 IN | OXYGEN SATURATION: 99 % | DIASTOLIC BLOOD PRESSURE: 62 MMHG | RESPIRATION RATE: 16 BRPM | WEIGHT: 129.63 LBS

## 2024-11-15 NOTE — PATIENT PROFILE ADULT - FALL HARM RISK - UNIVERSAL INTERVENTIONS
Bed in lowest position, wheels locked, appropriate side rails in place/Call bell, personal items and telephone in reach/Instruct patient to call for assistance before getting out of bed or chair/Non-slip footwear when patient is out of bed/[place] to call system/Physically safe environment - no spills, clutter or unnecessary equipment/Purposeful Proactive Rounding/Room/bathroom lighting operational, light cord in reach

## 2024-11-17 RX ORDER — POVIDONE-IODINE 7.5 %
1 SPONGE TOPICAL ONCE
Refills: 0 | Status: COMPLETED | OUTPATIENT
Start: 2024-11-18 | End: 2024-11-18

## 2024-11-18 ENCOUNTER — APPOINTMENT (OUTPATIENT)
Dept: NEUROSURGERY | Facility: HOSPITAL | Age: 54
End: 2024-11-18

## 2024-11-18 ENCOUNTER — RESULT REVIEW (OUTPATIENT)
Age: 54
End: 2024-11-18

## 2024-11-18 ENCOUNTER — TRANSCRIPTION ENCOUNTER (OUTPATIENT)
Age: 54
End: 2024-11-18

## 2024-11-18 ENCOUNTER — INPATIENT (INPATIENT)
Facility: HOSPITAL | Age: 54
LOS: 0 days | Discharge: HOME | DRG: 517 | End: 2024-11-18
Attending: NEUROLOGICAL SURGERY | Admitting: NEUROLOGICAL SURGERY
Payer: COMMERCIAL

## 2024-11-18 VITALS
RESPIRATION RATE: 16 BRPM | SYSTOLIC BLOOD PRESSURE: 96 MMHG | DIASTOLIC BLOOD PRESSURE: 55 MMHG | HEART RATE: 66 BPM | OXYGEN SATURATION: 100 %

## 2024-11-18 DIAGNOSIS — D36.13 BENIGN NEOPLASM OF PERIPHERAL NERVES AND AUTONOMIC NERVOUS SYSTEM OF LOWER LIMB, INCLUDING HIP: ICD-10-CM

## 2024-11-18 DIAGNOSIS — C49.21 MALIGNANT NEOPLASM OF CONNECTIVE AND SOFT TISSUE OF RIGHT LOWER LIMB, INCLUDING HIP: ICD-10-CM

## 2024-11-18 DIAGNOSIS — Z98.890 OTHER SPECIFIED POSTPROCEDURAL STATES: Chronic | ICD-10-CM

## 2024-11-18 PROCEDURE — 88341 IMHCHEM/IMCYTCHM EA ADD ANTB: CPT

## 2024-11-18 PROCEDURE — 88331 PATH CONSLTJ SURG 1 BLK 1SPC: CPT

## 2024-11-18 PROCEDURE — 88342 IMHCHEM/IMCYTCHM 1ST ANTB: CPT

## 2024-11-18 PROCEDURE — 88331 PATH CONSLTJ SURG 1 BLK 1SPC: CPT | Mod: 26

## 2024-11-18 PROCEDURE — 86900 BLOOD TYPING SEROLOGIC ABO: CPT

## 2024-11-18 PROCEDURE — 88341 IMHCHEM/IMCYTCHM EA ADD ANTB: CPT | Mod: 26

## 2024-11-18 PROCEDURE — 64790 REMOVAL OF NERVE LESION: CPT

## 2024-11-18 PROCEDURE — 88304 TISSUE EXAM BY PATHOLOGIST: CPT

## 2024-11-18 PROCEDURE — 88334 PATH CONSLTJ SURG CYTO XM EA: CPT | Mod: 26,59

## 2024-11-18 PROCEDURE — C1889: CPT

## 2024-11-18 PROCEDURE — 88342 IMHCHEM/IMCYTCHM 1ST ANTB: CPT | Mod: 26

## 2024-11-18 PROCEDURE — 86901 BLOOD TYPING SEROLOGIC RH(D): CPT

## 2024-11-18 PROCEDURE — 88304 TISSUE EXAM BY PATHOLOGIST: CPT | Mod: 26

## 2024-11-18 PROCEDURE — 88333 PATH CONSLTJ SURG CYTO XM 1: CPT

## 2024-11-18 PROCEDURE — 86850 RBC ANTIBODY SCREEN: CPT

## 2024-11-18 DEVICE — SURGIFOAM PAD 8CM X 12.5CM X 10MM (100): Type: IMPLANTABLE DEVICE | Site: RIGHT | Status: FUNCTIONAL

## 2024-11-18 DEVICE — SURGIFLO HEMOSTATIC MATRIX KIT: Type: IMPLANTABLE DEVICE | Site: RIGHT | Status: FUNCTIONAL

## 2024-11-18 DEVICE — SURGCEL 4 X 8": Type: IMPLANTABLE DEVICE | Site: RIGHT | Status: FUNCTIONAL

## 2024-11-18 DEVICE — FLOSEAL NT 5ML: Type: IMPLANTABLE DEVICE | Site: RIGHT | Status: FUNCTIONAL

## 2024-11-18 RX ORDER — ACETAMINOPHEN 500MG 500 MG/1
2 TABLET, COATED ORAL
Qty: 0 | Refills: 0 | DISCHARGE
Start: 2024-11-18

## 2024-11-18 RX ORDER — SENNOSIDES 8.6 MG
2 TABLET ORAL AT BEDTIME
Refills: 0 | Status: DISCONTINUED | OUTPATIENT
Start: 2024-11-18 | End: 2024-11-18

## 2024-11-18 RX ORDER — OXYCODONE HYDROCHLORIDE 30 MG/1
1 TABLET ORAL
Qty: 30 | Refills: 0
Start: 2024-11-18 | End: 2024-11-22

## 2024-11-18 RX ORDER — ACETAMINOPHEN 500MG 500 MG/1
650 TABLET, COATED ORAL EVERY 6 HOURS
Refills: 0 | Status: DISCONTINUED | OUTPATIENT
Start: 2024-11-18 | End: 2024-11-18

## 2024-11-18 RX ORDER — ONDANSETRON HYDROCHLORIDE 4 MG/1
4 TABLET, FILM COATED ORAL ONCE
Refills: 0 | Status: DISCONTINUED | OUTPATIENT
Start: 2024-11-18 | End: 2024-11-18

## 2024-11-18 RX ORDER — 0.9 % SODIUM CHLORIDE 0.9 %
1000 INTRAVENOUS SOLUTION INTRAVENOUS
Refills: 0 | Status: DISCONTINUED | OUTPATIENT
Start: 2024-11-18 | End: 2024-11-18

## 2024-11-18 RX ORDER — OXYCODONE HYDROCHLORIDE 30 MG/1
5 TABLET ORAL EVERY 4 HOURS
Refills: 0 | Status: DISCONTINUED | OUTPATIENT
Start: 2024-11-18 | End: 2024-11-18

## 2024-11-18 RX ORDER — SENNOSIDES 8.6 MG
2 TABLET ORAL
Qty: 0 | Refills: 0 | DISCHARGE
Start: 2024-11-18

## 2024-11-18 RX ORDER — CHLORHEXIDINE GLUCONATE 1.2 MG/ML
1 RINSE ORAL ONCE
Refills: 0 | Status: COMPLETED | OUTPATIENT
Start: 2024-11-18 | End: 2024-11-18

## 2024-11-18 RX ADMIN — Medication 1 APPLICATION(S): at 07:09

## 2024-11-18 RX ADMIN — CHLORHEXIDINE GLUCONATE 1 APPLICATION(S): 1.2 RINSE ORAL at 07:09

## 2024-11-18 NOTE — PRE-ANESTHESIA EVALUATION ADULT - NSANTHINDVINFOSD_GEN_ALL_CORE
patient Please see your pediatrician in 1-2 days for their first check up. This appointment is very important. The pediatrician will check to be sure that your baby is not losing too much weight, is staying hydrated, is not having jaundice and is continuing to do well. Please see your pediatrician in 1 day for their first check up. This appointment is very important. The pediatrician will check to be sure that your baby is not losing too much weight, is staying hydrated, is not having jaundice and is continuing to do well.

## 2024-11-18 NOTE — BRIEF OPERATIVE NOTE - OPERATION/FINDINGS
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

## 2024-11-18 NOTE — ASU DISCHARGE PLAN (ADULT/PEDIATRIC) - NS MD DC FALL RISK RISK
For information on Fall & Injury Prevention, visit: [email or web address] OR  [email or web address] OR  https://www.cdc.gov/steadi/patient.html

## 2024-11-18 NOTE — BRIEF OPERATIVE NOTE - NSICDXBRIEFPROCEDURE_GEN_ALL_CORE_FT
PROCEDURES:  Excision, subcutaneous tumor, knee area, 3 cm or greater 18-Nov-2024 10:23:11 Right popliteal nerve sheath tumor resection Maribell Titus

## 2024-11-18 NOTE — H&P ADULT - NSHPLABSRESULTS_GEN_ALL_CORE
The previously noted posterior soft tissue masses significantly enlarged in size and now measures  3.2 x 1.9 x 4.9 cm. This is fusiform in shape along the course of the neurovascular bundle and  appears compatible with a nerve sheath tumor. The lesion is intermediate to low in signal on T1-  weighted imaging with intermediate to bright signal on T2 fat-suppressed imaging with fairly diffuse  and heterogeneous enhancement on postcontrast imaging. There is significant mass effect on the  adjacent popliteal vein without visualization of the vein at the level of the tumor and with  heterogeneous signal within the vein above this level. This may be related to flow artifact.  However, deep venous thrombosis cannot be excluded. Correlation with Doppler ultrasound can be  obtained for more complete evaluation.  ?

## 2024-11-18 NOTE — ASU DISCHARGE PLAN (ADULT/PEDIATRIC) - ASU DC SPECIAL INSTRUCTIONSFT
your medication from the pharmacy downstairs please.    Take stool softeners over the counter and/or laxatives if you are taking the narcotics to prevent constipation.    You have 2 follow up visits: the first is in one week is telemedicine visit.  The second is in 2 weeks is in person visit.  your medication from the pharmacy downstairs please.    Take stool softeners over the counter and/or laxatives if you are taking the narcotics to prevent constipation.    You have 2 follow up visits: the first is in one week is telemedicine visit with EZE Stewart on 11/21/24 at 10 am.  The second is in person visit with Dr. D'amico on Weds 11/27 at 12:30pm.    You may elevate the leg as needed and ice on and off for 20 minutes as needed for pain.  Do not put the ice directly on skin or on incision.  If you have mild pain you may take tylenol over the counter.

## 2024-11-18 NOTE — ASU DISCHARGE PLAN (ADULT/PEDIATRIC) - FINANCIAL ASSISTANCE
[facility] provides services at a reduced cost to those who are determined to be eligible through [facility]’s financial assistance program. Information regarding [facility]’s financial assistance program can be found by going to [email or web address] or by calling [phone].

## 2024-11-18 NOTE — ASU DISCHARGE PLAN (ADULT/PEDIATRIC) - PROVIDER TOKENS
PROVIDER:[TOKEN:[[id number]],FOLLOWUP:[2 weeks]],PROVIDER:[TOKEN:[[id number]],FOLLOWUP:[1 week]] PROVIDER:[TOKEN:[[id number]],SCHEDULEDAPPT:[11/27/2024],SCHEDULEDAPPTTIME:[12:30 PM],ESTABLISHEDPATIENT:[T]],PROVIDER:[TOKEN:[[id number]],SCHEDULEDAPPT:[11/21/2024],SCHEDULEDAPPTTIME:[10:00 AM],ESTABLISHEDPATIENT:[T]]

## 2024-11-18 NOTE — H&P ADULT - HISTORY OF PRESENT ILLNESS
53 y/o female, never smoker, with no significant PMHx with a soft tissue mass of right knee that has progressed in size over the past 19 months.  ?  In brief  - Feb 2023 patient saw ortho Dr. Blanchard for evaluation of posterior right knee discomfort without known trauma  - 3/7/23 MRI showed a 2 cm ovoid lesion within the popliteal fossa c/f nerve sheath tumor  - 10/14/24 repeated MRI which reported significant increase in size of posterior soft tissue mass; measuring 3.2 x 1.9 x 4.9 cm.  ?  Pt endorses continued pain behind and to the lateral side of her right knee that radiates down towards her calf. Pain is now daily and bothersome to her.  Denies weakness of her leg, toes.

## 2024-11-18 NOTE — ASU DISCHARGE PLAN (ADULT/PEDIATRIC) - CARE PROVIDER_API CALL
D'Amico, Randy Scott  Neurosurgery  [address] [address]  Phone: [phone]  Fax: [phone]  Follow Up Time: 2 weeks    Pat Fabian NP in Family Health  [address] [place] [address]  Phone: [phone]  Fax: [phone]  Follow Up Time: 1 week   D'Amico, Randy Scott  Neurosurgery  [address] [address]  Phone: [phone]  Fax: [phone]  Established Patient  Scheduled Appointment: 11/27/2024 12:30 PM    Pat Fabian NP in [facility]  [address] [place] [address]  Phone: [phone]  Fax: [phone]  Established Patient  Scheduled Appointment: 11/21/2024 10:00 AM

## 2024-11-19 ENCOUNTER — TRANSCRIPTION ENCOUNTER (OUTPATIENT)
Age: 54
End: 2024-11-19

## 2024-11-20 ENCOUNTER — NON-APPOINTMENT (OUTPATIENT)
Age: 54
End: 2024-11-20

## 2024-11-20 DIAGNOSIS — M79.89 OTHER SPECIFIED SOFT TISSUE DISORDERS: ICD-10-CM

## 2024-11-21 ENCOUNTER — APPOINTMENT (OUTPATIENT)
Dept: NEUROSURGERY | Facility: CLINIC | Age: 54
End: 2024-11-21
Payer: COMMERCIAL

## 2024-11-21 PROBLEM — Z09 POSTOP CHECK: Status: ACTIVE | Noted: 2024-11-20

## 2024-11-21 PROBLEM — Z51.89 VISIT FOR WOUND CHECK: Status: ACTIVE | Noted: 2024-11-21

## 2024-11-21 PROCEDURE — 99441: CPT

## 2024-11-25 ENCOUNTER — NON-APPOINTMENT (OUTPATIENT)
Age: 54
End: 2024-11-25

## 2024-11-26 ENCOUNTER — RESULT REVIEW (OUTPATIENT)
Age: 54
End: 2024-11-26

## 2024-11-27 ENCOUNTER — APPOINTMENT (OUTPATIENT)
Dept: NEUROSURGERY | Facility: CLINIC | Age: 54
End: 2024-11-27
Payer: COMMERCIAL

## 2024-11-27 DIAGNOSIS — Z51.89 ENCOUNTER FOR OTHER SPECIFIED AFTERCARE: ICD-10-CM

## 2024-11-27 DIAGNOSIS — C49.9 MALIGNANT NEOPLASM OF CONNECTIVE AND SOFT TISSUE, UNSPECIFIED: ICD-10-CM

## 2024-11-27 DIAGNOSIS — D49.89 NEOPLASM OF UNSPECIFIED BEHAVIOR OF OTHER SPECIFIED SITES: ICD-10-CM

## 2024-11-27 DIAGNOSIS — Z09 ENCOUNTER FOR FOLLOW-UP EXAMINATION AFTER COMPLETED TREATMENT FOR CONDITIONS OTHER THAN MALIGNANT NEOPLASM: ICD-10-CM

## 2024-11-27 PROCEDURE — 99024 POSTOP FOLLOW-UP VISIT: CPT

## 2024-11-29 ENCOUNTER — RESULT REVIEW (OUTPATIENT)
Age: 54
End: 2024-11-29

## 2024-12-02 ENCOUNTER — NON-APPOINTMENT (OUTPATIENT)
Age: 54
End: 2024-12-02

## 2024-12-04 ENCOUNTER — OUTPATIENT (OUTPATIENT)
Dept: OUTPATIENT SERVICES | Facility: HOSPITAL | Age: 54
LOS: 1 days | Discharge: HOME | End: 2024-12-04

## 2024-12-04 DIAGNOSIS — C49.9 MALIGNANT NEOPLASM OF CONNECTIVE AND SOFT TISSUE, UNSPECIFIED: ICD-10-CM

## 2024-12-04 DIAGNOSIS — Z98.890 OTHER SPECIFIED POSTPROCEDURAL STATES: Chronic | ICD-10-CM

## 2024-12-09 ENCOUNTER — APPOINTMENT (OUTPATIENT)
Dept: HEMATOLOGY ONCOLOGY | Facility: CLINIC | Age: 54
End: 2024-12-09
Payer: COMMERCIAL

## 2024-12-09 VITALS
RESPIRATION RATE: 16 BRPM | TEMPERATURE: 96.6 F | BODY MASS INDEX: 25.02 KG/M2 | HEIGHT: 61.18 IN | DIASTOLIC BLOOD PRESSURE: 74 MMHG | SYSTOLIC BLOOD PRESSURE: 114 MMHG | OXYGEN SATURATION: 99 % | WEIGHT: 132.5 LBS | HEART RATE: 68 BPM

## 2024-12-09 DIAGNOSIS — C49.9 MALIGNANT NEOPLASM OF CONNECTIVE AND SOFT TISSUE, UNSPECIFIED: ICD-10-CM

## 2024-12-09 DIAGNOSIS — Z78.9 OTHER SPECIFIED HEALTH STATUS: ICD-10-CM

## 2024-12-09 PROCEDURE — 99245 OFF/OP CONSLTJ NEW/EST HI 55: CPT

## 2024-12-10 PROBLEM — Z78.9 NO FAMILY HISTORY OF CANCER: Status: ACTIVE | Noted: 2024-12-10

## 2024-12-13 ENCOUNTER — APPOINTMENT (OUTPATIENT)
Dept: ORTHOPEDIC SURGERY | Facility: CLINIC | Age: 54
End: 2024-12-13
Payer: COMMERCIAL

## 2024-12-13 PROCEDURE — 99205 OFFICE O/P NEW HI 60 MIN: CPT

## 2024-12-16 ENCOUNTER — APPOINTMENT (OUTPATIENT)
Dept: BARIATRICS/WEIGHT MGMT | Facility: CLINIC | Age: 54
End: 2024-12-16

## 2024-12-18 ENCOUNTER — APPOINTMENT (OUTPATIENT)
Dept: BARIATRICS/WEIGHT MGMT | Facility: CLINIC | Age: 54
End: 2024-12-18

## 2024-12-19 ENCOUNTER — APPOINTMENT (OUTPATIENT)
Dept: ORTHOPEDIC SURGERY | Facility: CLINIC | Age: 54
End: 2024-12-19

## 2025-01-09 ENCOUNTER — APPOINTMENT (OUTPATIENT)
Dept: PLASTIC SURGERY | Facility: CLINIC | Age: 55
End: 2025-01-09
Payer: COMMERCIAL

## 2025-01-09 VITALS — BODY MASS INDEX: 25.49 KG/M2 | HEIGHT: 61.18 IN | HEART RATE: 61 BPM | WEIGHT: 135 LBS | OXYGEN SATURATION: 99 %

## 2025-01-09 PROCEDURE — 99205 OFFICE O/P NEW HI 60 MIN: CPT

## 2025-01-23 ENCOUNTER — APPOINTMENT (OUTPATIENT)
Dept: ORTHOPEDIC SURGERY | Facility: CLINIC | Age: 55
End: 2025-01-23
Payer: COMMERCIAL

## 2025-01-23 VITALS
WEIGHT: 135 LBS | HEART RATE: 66 BPM | DIASTOLIC BLOOD PRESSURE: 64 MMHG | OXYGEN SATURATION: 100 % | HEIGHT: 61 IN | SYSTOLIC BLOOD PRESSURE: 100 MMHG | BODY MASS INDEX: 25.49 KG/M2 | RESPIRATION RATE: 18 BRPM | TEMPERATURE: 98 F

## 2025-01-23 DIAGNOSIS — M75.81 OTHER SHOULDER LESIONS, RIGHT SHOULDER: ICD-10-CM

## 2025-01-23 PROCEDURE — 73030 X-RAY EXAM OF SHOULDER: CPT | Mod: RT

## 2025-01-23 PROCEDURE — 99203 OFFICE O/P NEW LOW 30 MIN: CPT

## 2025-02-04 ENCOUNTER — APPOINTMENT (OUTPATIENT)
Dept: VASCULAR SURGERY | Facility: CLINIC | Age: 55
End: 2025-02-04
Payer: COMMERCIAL

## 2025-02-04 VITALS
BODY MASS INDEX: 26.06 KG/M2 | DIASTOLIC BLOOD PRESSURE: 67 MMHG | SYSTOLIC BLOOD PRESSURE: 100 MMHG | HEIGHT: 61 IN | WEIGHT: 138 LBS | HEART RATE: 69 BPM

## 2025-02-04 DIAGNOSIS — C49.9 MALIGNANT NEOPLASM OF CONNECTIVE AND SOFT TISSUE, UNSPECIFIED: ICD-10-CM

## 2025-02-04 DIAGNOSIS — D49.89 NEOPLASM OF UNSPECIFIED BEHAVIOR OF OTHER SPECIFIED SITES: ICD-10-CM

## 2025-02-04 PROCEDURE — 93971 EXTREMITY STUDY: CPT

## 2025-02-04 PROCEDURE — 99204 OFFICE O/P NEW MOD 45 MIN: CPT

## 2025-02-25 PROBLEM — M23.8X1 OTHER INTERNAL DERANGEMENTS OF RIGHT KNEE: Chronic | Status: ACTIVE | Noted: 2025-02-10

## 2025-03-05 ENCOUNTER — APPOINTMENT (OUTPATIENT)
Dept: BARIATRICS/WEIGHT MGMT | Facility: CLINIC | Age: 55
End: 2025-03-05

## 2025-03-19 VITALS
OXYGEN SATURATION: 100 % | HEIGHT: 60 IN | HEART RATE: 64 BPM | WEIGHT: 132.28 LBS | DIASTOLIC BLOOD PRESSURE: 55 MMHG | SYSTOLIC BLOOD PRESSURE: 98 MMHG | TEMPERATURE: 98 F | RESPIRATION RATE: 18 BRPM

## 2025-03-19 NOTE — PRE-OP CHECKLIST - HEIGHT IN FEET
PRINCIPAL DISCHARGE DIAGNOSIS  Diagnosis: Obstructive sleep apnea  Assessment and Plan of Treatment:      5

## 2025-03-19 NOTE — PATIENT PROFILE ADULT - FALL HARM RISK - HARM RISK INTERVENTIONS

## 2025-03-19 NOTE — PATIENT PROFILE ADULT - FALL HARM RISK - FALL HARM RISK
Bone Condition/Surgery/Other This is a 65-year-old female referred by Dr. Shira Mendoza for  fu after colon ca screening. Patient's maternal grandfather had colon cancer.  She had a history of diverticulitis by CT scan done on February 21, 2017 that showed focal colitis the distal ascending colon.   Patient had a colonoscopy with me in 2017 where to small polyps were removed from the cecum.  I recommended follow-up exam in 3 years and she had this exam on June 26, 2020.  This revealed diverticula in the sigmoid and descending colon.  A 4 mm cecal polyp was removed.  This was a tubular adenoma so I recommended that the patient return for another colonoscopy in 3 years or June 2023. She tried to prep but pt got through most of the miralax prep but half way through it she started having a racing heart beat so she had to stop. Pt was drinking lots of liquids. Today's visit- Patient had a colonoscopy done at [facility] lab on July 22, 2024 for history of colon polyps.  This revealed one 4 mm cecal polyp that was removed.  Pathology showed a tubular adenoma so I recommended repeat exam in 3-5 years or July 2029. Pt goes to the bathroom daily. No gerd. Pt still good since cardiac ablation- no further Atrial fib. Has not had diverticulitis since 2017.

## 2025-03-19 NOTE — PATIENT PROFILE ADULT - FUNCTIONAL ASSESSMENT - DAILY ACTIVITY SCORE.
Federico Borges is a 54 y.o. male      Chief Complaint   Patient presents with   Junior Quiñonese of the roof this past Saturday hurt back     Diabetes    Medication Refill         1. Have you been to the ER, urgent care clinic since your last visit? No   Hospitalized since your last visit? No       2. Have you seen or consulted any other health care providers outside of the [address] [place] [address] since your last visit? Include any pap smears or colon screening.    No 24

## 2025-03-20 ENCOUNTER — APPOINTMENT (OUTPATIENT)
Dept: ORTHOPEDIC SURGERY | Facility: HOSPITAL | Age: 55
End: 2025-03-20

## 2025-03-25 ENCOUNTER — RESULT REVIEW (OUTPATIENT)
Age: 55
End: 2025-03-25

## 2025-03-27 ENCOUNTER — INPATIENT (INPATIENT)
Facility: HOSPITAL | Age: 55
LOS: 6 days | Discharge: HOME | End: 2025-04-03
Attending: ORTHOPAEDIC SURGERY | Admitting: ORTHOPAEDIC SURGERY
Payer: COMMERCIAL

## 2025-03-27 ENCOUNTER — APPOINTMENT (OUTPATIENT)
Age: 55
End: 2025-03-27

## 2025-03-27 ENCOUNTER — TRANSCRIPTION ENCOUNTER (OUTPATIENT)
Age: 55
End: 2025-03-27

## 2025-03-27 ENCOUNTER — RESULT REVIEW (OUTPATIENT)
Age: 55
End: 2025-03-27

## 2025-03-27 ENCOUNTER — APPOINTMENT (OUTPATIENT)
Dept: ORTHOPEDIC SURGERY | Facility: HOSPITAL | Age: 55
End: 2025-03-27

## 2025-03-27 DIAGNOSIS — Z98.890 OTHER SPECIFIED POSTPROCEDURAL STATES: Chronic | ICD-10-CM

## 2025-03-27 DIAGNOSIS — C49.20 MALIGNANT NEOPLASM OF CONNECTIVE AND SOFT TISSUE OF UNSPECIFIED LOWER LIMB, INCLUDING HIP: ICD-10-CM

## 2025-03-27 DIAGNOSIS — Z98.82 BREAST IMPLANT STATUS: Chronic | ICD-10-CM

## 2025-03-27 PROCEDURE — 14302 TIS TRNFR ADDL 30 SQ CM: CPT

## 2025-03-27 PROCEDURE — 15738 MUSCLE-SKIN GRAFT LEG: CPT

## 2025-03-27 PROCEDURE — 35703 EXPL N/FLWD SURG LXTR ART: CPT | Mod: GC

## 2025-03-27 PROCEDURE — 14301 TIS TRNFR ANY 30.1-60 SQ CM: CPT

## 2025-03-27 PROCEDURE — 88309 TISSUE EXAM BY PATHOLOGIST: CPT | Mod: 26

## 2025-03-27 PROCEDURE — 64898 NRV GRF MLTST ARM/LEG >4 CM: CPT | Mod: 59

## 2025-03-27 PROCEDURE — 64902 NERVE GRAFT ADD-ON: CPT

## 2025-03-27 PROCEDURE — 64905 NERVE PEDICLE TRANSFER: CPT

## 2025-03-27 PROCEDURE — 27364 RESECT THIGH/KNEE TUM 5 CM/>: CPT | Mod: RT

## 2025-03-27 DEVICE — CLIP APPLIER ETHICON LIGACLIP 11.5" MEDIUM: Type: IMPLANTABLE DEVICE | Site: RIGHT | Status: FUNCTIONAL

## 2025-03-27 DEVICE — TISSEEL 4ML: Type: IMPLANTABLE DEVICE | Site: RIGHT | Status: FUNCTIONAL

## 2025-03-27 DEVICE — LIGATING CLIPS WECK HORIZON SMALL (YELLOW) 24: Type: IMPLANTABLE DEVICE | Site: RIGHT | Status: FUNCTIONAL

## 2025-03-27 DEVICE — LIGATING CLIPS WECK HORIZON SMALL-WIDE (RED) 24: Type: IMPLANTABLE DEVICE | Site: RIGHT | Status: FUNCTIONAL

## 2025-03-27 DEVICE — GRAFT NERVE PROTECTOR 5X40MM: Type: IMPLANTABLE DEVICE | Site: RIGHT | Status: FUNCTIONAL

## 2025-03-27 DEVICE — CLIP APPLIER ETHICON LIGACLIP 9 3/8" SMALL: Type: IMPLANTABLE DEVICE | Site: RIGHT | Status: FUNCTIONAL

## 2025-03-27 DEVICE — LIGATING CLIPS WECK HORIZON MEDIUM (BLUE) 24: Type: IMPLANTABLE DEVICE | Site: RIGHT | Status: FUNCTIONAL

## 2025-03-27 DEVICE — SURGCEL 4 X 8": Type: IMPLANTABLE DEVICE | Site: RIGHT | Status: FUNCTIONAL

## 2025-03-27 RX ORDER — HYDROMORPHONE/SOD CHLOR,ISO/PF 2 MG/10 ML
0.5 SYRINGE (ML) INJECTION
Refills: 0 | Status: DISCONTINUED | OUTPATIENT
Start: 2025-03-27 | End: 2025-03-27

## 2025-03-27 RX ORDER — ACETAMINOPHEN 500 MG/5ML
1000 LIQUID (ML) ORAL EVERY 8 HOURS
Refills: 0 | Status: DISCONTINUED | OUTPATIENT
Start: 2025-03-27 | End: 2025-04-03

## 2025-03-27 RX ORDER — SENNA 187 MG
2 TABLET ORAL AT BEDTIME
Refills: 0 | Status: DISCONTINUED | OUTPATIENT
Start: 2025-03-27 | End: 2025-04-03

## 2025-03-27 RX ORDER — POLYETHYLENE GLYCOL 3350 17 G/17G
17 POWDER, FOR SOLUTION ORAL AT BEDTIME
Refills: 0 | Status: DISCONTINUED | OUTPATIENT
Start: 2025-03-27 | End: 2025-04-03

## 2025-03-27 RX ORDER — POVIDONE-IODINE 7.5 %
1 SOLUTION, NON-ORAL TOPICAL ONCE
Refills: 0 | Status: COMPLETED | OUTPATIENT
Start: 2025-03-27 | End: 2025-03-27

## 2025-03-27 RX ORDER — CEFAZOLIN SODIUM IN 0.9 % NACL 3 G/100 ML
2000 INTRAVENOUS SOLUTION, PIGGYBACK (ML) INTRAVENOUS EVERY 8 HOURS
Refills: 0 | Status: COMPLETED | OUTPATIENT
Start: 2025-03-27 | End: 2025-03-28

## 2025-03-27 RX ORDER — SODIUM CHLORIDE 9 G/1000ML
1000 INJECTION, SOLUTION INTRAVENOUS
Refills: 0 | Status: DISCONTINUED | OUTPATIENT
Start: 2025-03-28 | End: 2025-03-31

## 2025-03-27 RX ORDER — BUPIVACAINE 13.3 MG/ML
20 INJECTION, SUSPENSION, LIPOSOMAL INFILTRATION ONCE
Refills: 0 | Status: DISCONTINUED | OUTPATIENT
Start: 2025-03-27 | End: 2025-04-03

## 2025-03-27 RX ORDER — OXYCODONE HYDROCHLORIDE 30 MG/1
5 TABLET ORAL
Refills: 0 | Status: DISCONTINUED | OUTPATIENT
Start: 2025-03-27 | End: 2025-04-03

## 2025-03-27 RX ORDER — MAGNESIUM HYDROXIDE 400 MG/5ML
30 SUSPENSION ORAL DAILY
Refills: 0 | Status: DISCONTINUED | OUTPATIENT
Start: 2025-03-27 | End: 2025-04-03

## 2025-03-27 RX ORDER — ENOXAPARIN SODIUM 100 MG/ML
30 INJECTION SUBCUTANEOUS EVERY 12 HOURS
Refills: 0 | Status: DISCONTINUED | OUTPATIENT
Start: 2025-03-28 | End: 2025-03-28

## 2025-03-27 RX ORDER — OXYCODONE HYDROCHLORIDE 30 MG/1
10 TABLET ORAL
Refills: 0 | Status: DISCONTINUED | OUTPATIENT
Start: 2025-03-27 | End: 2025-04-02

## 2025-03-27 RX ORDER — ONDANSETRON HCL/PF 4 MG/2 ML
4 VIAL (ML) INJECTION EVERY 6 HOURS
Refills: 0 | Status: DISCONTINUED | OUTPATIENT
Start: 2025-03-27 | End: 2025-04-03

## 2025-03-27 RX ADMIN — POLYETHYLENE GLYCOL 3350 17 GRAM(S): 17 POWDER, FOR SOLUTION ORAL at 21:20

## 2025-03-27 RX ADMIN — Medication 100 MILLIGRAM(S): at 21:19

## 2025-03-27 RX ADMIN — Medication 1000 MILLIGRAM(S): at 21:20

## 2025-03-27 RX ADMIN — Medication 1 APPLICATION(S): at 07:36

## 2025-03-27 RX ADMIN — Medication 2 TABLET(S): at 21:20

## 2025-03-27 NOTE — BRIEF OPERATIVE NOTE - OPERATION/FINDINGS
Tumor bed (soft tissue posterior to popliteal artery en bloc to skin) had been mobilized. Assisted with freeing specimen from popliteal artery. Small branches which were involved were ligated with care to preserve significant geniculates. Incision over popliteal fossa carried along posterior midline to mid-calf in order to dissect out the small saphenous vein. This was not of adequate caliber for interposition vein bypass. Popliteal vein divided and included in specimen. 
Please see operative report for further detail.  
tibial nerve repair with sural cable grafting with axogen nerve wraps. Fasciocutaneous local flap closure of popliteal wound, R leg.

## 2025-03-27 NOTE — BRIEF OPERATIVE NOTE - NSICDXBRIEFPROCEDURE_GEN_ALL_CORE_FT
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  
PROCEDURES:  Repair, nerve, sural 27-Mar-2025 14:01:41  Abe Moyer

## 2025-03-27 NOTE — BRIEF OPERATIVE NOTE - NSICDXBRIEFPOSTOP_GEN_ALL_CORE_FT
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

## 2025-03-27 NOTE — H&P ADULT - NSHPLABSRESULTS_GEN_ALL_CORE
Preop cbc, bmp, pt/inr, ptt, ua wnl  Cr 0.39, H+H 13.4/40.6  preop cxr wnl per clearance  preop ekg wnl per clearance  3M DOS

## 2025-03-27 NOTE — PROGRESS NOTE ADULT - SUBJECTIVE AND OBJECTIVE BOX
Ortho Post Op Check    Procedure: radical resection of right knee sarcoma  Surgeon: Dr. Villa    Pt comfortable without complaints, pain controlled  Denies CP, SOB, N/V, numbness/tingling     Vital Signs Last 24 Hrs  T(C): 36.1 (03-27-25 @ 13:48), Max: 36.1 (03-27-25 @ 13:48)  T(F): 97 (03-27-25 @ 13:48), Max: 97 (03-27-25 @ 13:48)  HR: 65 (03-27-25 @ 15:18) (55 - 67)  BP: 99/54 (03-27-25 @ 15:18) (93/57 - 111/63)  BP(mean): 71 (03-27-25 @ 15:18) (69 - 82)  RR: 16 (03-27-25 @ 15:18) (9 - 16)  SpO2: 100% (03-27-25 @ 15:18) (98% - 100%)  I&O's Summary      General: Pt Alert and oriented, NAD  DSG C/D/I - Xeroform, gauze, abd Ace with KI, KAYCE x 1 holding suction   Pulses: 2+ DP. skin warm dry well perfused b/l  Sensation: decreased sensation to light touch over plantar aspect of right foot, and decreased sensation to the posterior lateral right foot. Sensation intact to light touch over dorsum of right foot (expected post op), sensesation intact over right great toe, 2nd, 3rd toe  Motor: EHL/TA 4+/5 FHL/GS 0/5 to RLE (expected post-op)              A/P: 55 y/o Female POD#0 s/p right knee sarcoma resection with Dr. 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 Villa    Pt comfortable without complaints, pain controlled  Denies CP, SOB, N/V, numbness/tingling     Vital Signs Last 24 Hrs  T(C): 36.1 (03-27-25 @ 13:48), Max: 36.1 (03-27-25 @ 13:48)  T(F): 97 (03-27-25 @ 13:48), Max: 97 (03-27-25 @ 13:48)  HR: 65 (03-27-25 @ 15:18) (55 - 67)  BP: 99/54 (03-27-25 @ 15:18) (93/57 - 111/63)  BP(mean): 71 (03-27-25 @ 15:18) (69 - 82)  RR: 16 (03-27-25 @ 15:18) (9 - 16)  SpO2: 100% (03-27-25 @ 15:18) (98% - 100%)  I&O's Summary      General: Pt Alert and oriented, NAD  DSG C/D/I - Xeroform, gauze, abd Ace with KI, KAYCE x 1 holding suction   Pulses: 2+ DP. skin warm dry well perfused b/l  Sensation: decreased sensation to light touch over plantar aspect of right foot, and decreased sensation to the posterior lateral right foot. Sensation intact to light touch over dorsum of right foot (expected post op), sensation intact over dorsum of great toe, 2nd, 3rd, 4th toe of right foot   Motor: EHL/TA 4+/5 FHL/GS 0/5 to RLE (expected post-op)              A/P: 55 y/o Female POD#0 s/p right knee sarcoma resection with Dr. Villa   - Stable  - Pain Control  - Serial motor exams  - KAYCE x1 in place - monitor output  - DVT ppx: SCDs  - Post op abx: Ancef  - PT, WBS: WBAT  - Dispo: PACU - pending PT eval    Ortho Pager [phone]

## 2025-03-27 NOTE — H&P ADULT - NSICDXPASTSURGICALHX_GEN_ALL_CORE_FT
PAST SURGICAL HISTORY:  H/O breast implant s/p removed    H/O knee surgery right 11/2024, tumor resection

## 2025-03-27 NOTE — BRIEF OPERATIVE NOTE - PRIMARY SURGEON
Patient  had to reschedule her appointment and was wondering if she needed to get TSH done prior to her next appointment. Patient was told to wait to her appointment next week with Dr Link and he will discuss timing of this testing. Patient was in agreement with plan.    <<----- Click to Select Surgeon

## 2025-03-27 NOTE — BRIEF OPERATIVE NOTE - NSICDXBRIEFPREOP_GEN_ALL_CORE_FT
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

## 2025-03-27 NOTE — PRE-ANESTHESIA EVALUATION ADULT - NSANTHADDINFOFT_GEN_ALL_CORE
Discussed risks of general anesthesia, prone positioning, and peripheral nerve injury-including risk of nerve injury. All questions answered and patient is in agreement

## 2025-03-27 NOTE — H&P ADULT - HISTORY OF PRESENT ILLNESS
Patient is a 54y F reporting a "tumor" near posterior lateral right knee, which was removed in November 2024. Pathology showed sarcoma. Denies pain,  numbness, tingling. Ambulates without assist. Presents today for elective radical resection of right knee sarcoma, vasulcar reconstruction, nerve graft and reconstruction right leg nerve with nerve graft and local regional muscle flap.

## 2025-03-27 NOTE — BRIEF OPERATIVE NOTE - COMMENTS
Right popliteal sarcoma bed resection with Tibial nerve resection, tibial nerve graft by Dr. Noble and plastic surgery team. Popliteal artery dissection performed by Dr. Coulter and vascular surgery team.

## 2025-03-27 NOTE — H&P ADULT - PROBLEM SELECTOR PLAN 1
Admit to orthopedics.  radical resection of right knee sarcoma, vasulcar reconstruction, nerve graft and reconstruction right leg nerve with nerve graft and local regional muscle flaps    Cleared for procedure by Dr. Etta Olson

## 2025-03-28 LAB
ANION GAP SERPL CALC-SCNC: 11 MMOL/L — SIGNIFICANT CHANGE UP (ref 5–17)
BUN SERPL-MCNC: 14 MG/DL — SIGNIFICANT CHANGE UP (ref 7–23)
CALCIUM SERPL-MCNC: 8.3 MG/DL — LOW (ref 8.4–10.5)
CHLORIDE SERPL-SCNC: 104 MMOL/L — SIGNIFICANT CHANGE UP (ref 96–108)
CO2 SERPL-SCNC: 25 MMOL/L — SIGNIFICANT CHANGE UP (ref 22–31)
CREAT SERPL-MCNC: 0.45 MG/DL — LOW (ref 0.5–1.3)
EGFR: 116 ML/MIN/1.73M2 — SIGNIFICANT CHANGE UP
EGFR: 116 ML/MIN/1.73M2 — SIGNIFICANT CHANGE UP
GLUCOSE SERPL-MCNC: 124 MG/DL — HIGH (ref 70–99)
HCT VFR BLD CALC: 30.5 % — LOW (ref 34.5–45)
HGB BLD-MCNC: 10 G/DL — LOW (ref 11.5–15.5)
MCHC RBC-ENTMCNC: 29.3 PG — SIGNIFICANT CHANGE UP (ref 27–34)
MCHC RBC-ENTMCNC: 32.8 G/DL — SIGNIFICANT CHANGE UP (ref 32–36)
MCV RBC AUTO: 89.4 FL — SIGNIFICANT CHANGE UP (ref 80–100)
NRBC BLD AUTO-RTO: 0 /100 WBCS — SIGNIFICANT CHANGE UP (ref 0–0)
PLATELET # BLD AUTO: 278 K/UL — SIGNIFICANT CHANGE UP (ref 150–400)
POTASSIUM SERPL-MCNC: 4.1 MMOL/L — SIGNIFICANT CHANGE UP (ref 3.5–5.3)
POTASSIUM SERPL-SCNC: 4.1 MMOL/L — SIGNIFICANT CHANGE UP (ref 3.5–5.3)
RBC # BLD: 3.41 M/UL — LOW (ref 3.8–5.2)
RBC # FLD: 13.2 % — SIGNIFICANT CHANGE UP (ref 10.3–14.5)
SODIUM SERPL-SCNC: 140 MMOL/L — SIGNIFICANT CHANGE UP (ref 135–145)
WBC # BLD: 10.99 K/UL — HIGH (ref 3.8–10.5)
WBC # FLD AUTO: 10.99 K/UL — HIGH (ref 3.8–10.5)

## 2025-03-28 PROCEDURE — 99222 1ST HOSP IP/OBS MODERATE 55: CPT

## 2025-03-28 RX ORDER — ENOXAPARIN SODIUM 100 MG/ML
60 INJECTION SUBCUTANEOUS EVERY 12 HOURS
Refills: 0 | Status: DISCONTINUED | OUTPATIENT
Start: 2025-03-28 | End: 2025-03-31

## 2025-03-28 RX ORDER — GABAPENTIN 400 MG/1
300 CAPSULE ORAL EVERY 8 HOURS
Refills: 0 | Status: DISCONTINUED | OUTPATIENT
Start: 2025-03-28 | End: 2025-04-03

## 2025-03-28 RX ADMIN — Medication 1000 MILLIGRAM(S): at 06:25

## 2025-03-28 RX ADMIN — Medication 100 MILLIGRAM(S): at 06:25

## 2025-03-28 RX ADMIN — GABAPENTIN 300 MILLIGRAM(S): 400 CAPSULE ORAL at 14:28

## 2025-03-28 RX ADMIN — ENOXAPARIN SODIUM 60 MILLIGRAM(S): 100 INJECTION SUBCUTANEOUS at 19:07

## 2025-03-28 RX ADMIN — Medication 1000 MILLIGRAM(S): at 14:28

## 2025-03-28 RX ADMIN — SODIUM CHLORIDE 100 MILLILITER(S): 9 INJECTION, SOLUTION INTRAVENOUS at 00:50

## 2025-03-28 RX ADMIN — Medication 1000 MILLIGRAM(S): at 21:21

## 2025-03-28 RX ADMIN — Medication 2 TABLET(S): at 21:21

## 2025-03-28 RX ADMIN — POLYETHYLENE GLYCOL 3350 17 GRAM(S): 17 POWDER, FOR SOLUTION ORAL at 21:21

## 2025-03-28 RX ADMIN — ENOXAPARIN SODIUM 30 MILLIGRAM(S): 100 INJECTION SUBCUTANEOUS at 06:25

## 2025-03-28 RX ADMIN — GABAPENTIN 300 MILLIGRAM(S): 400 CAPSULE ORAL at 21:22

## 2025-03-28 NOTE — PROGRESS NOTE ADULT - SUBJECTIVE AND OBJECTIVE BOX
Ortho Note    Patient seen and examined at bedside on AM rounds. Pain controlled. Endorses numbness to the right foot. Denies CP, SOB, N/V.     Vital Signs Last 24 Hrs  T(C): 36.8 (03-28-25 @ 15:40), Max: 36.8 (03-28-25 @ 15:40)  T(F): 98.3 (03-28-25 @ 15:40), Max: 98.3 (03-28-25 @ 15:40)  HR: 76 (03-28-25 @ 15:40) (76 - 76)  BP: 91/51 (03-28-25 @ 15:40) (91/51 - 91/51)  BP(mean): --  RR: 16 (03-28-25 @ 15:40) (16 - 16)  SpO2: 96% (03-28-25 @ 15:40) (96% - 96%)  I&O's Summary    27 Mar 2025 07:01  -  28 Mar 2025 07:00  --------------------------------------------------------  IN: 360 mL / OUT: 855 mL / NET: -495 mL    28 Mar 2025 07:01  -  28 Mar 2025 17:05  --------------------------------------------------------  IN: 0 mL / OUT: 735 mL / NET: -735 mL        General: Pt Alert and oriented, NAD  DSG C/D/I- ACE wrap to RLE. Sun brace to RLE locked at 20 degrees of flexion  Abdomen: soft NTND  Pulses: 2+ DP pulses palpable bilaterally, skin wwp, cap refill brisk, no calf tenderness bilaterally. RLE moderately swollen, compartments soft and compressible. RLE elevated on pillows.  Sensation: Intact to light touch throughout LLE. No sensation to light touch throughout the plantar aspect of the right foot.   Motor: EHL/FHL/TA/GS 5/5 LLE. RLE: FHL/TA 0/5, EHL/GS 3/5.                          10.0   10.99 )-----------( 278      ( 28 Mar 2025 05:30 )             30.5     03-28    140  |  104  |  14  ----------------------------<  124[H]  4.1   |  25  |  0.45[L]    Ca    8.3[L]      28 Mar 2025 05:30        A/P: 55 y/o Female POD#1 s/p right popliteal sarcoma bed resection with tibial nerve resection and grafting on 3/27 with Dr. Villa  - Vitals reviewed, patient hypotensive  - AM labs reviewed, stable  - Appreciate medicine recommendations  - Pain Control  - OOB/IS  - Bowel Regimen  - RLE duplex ordered to r/o DVT  - [place] brace to RLE at 20 degrees flexion   - Elevation RLE  - DVT ppx: Lovenox 60mg SQ q12h given patient's significant risk for DVT  - Monitor KAYCE output  - Appreciate Plastics recs  - Appreciate Vascular recs  - SCD to right foot to improve swelling   - PT, WBS: TTWB RLE  - Dispo: pending PT eval Ortho Note    Patient seen and examined at bedside on AM rounds. Pain controlled. Endorses numbness to the right foot. Denies CP, SOB, N/V.     Vital Signs Last 24 Hrs  T(C): 36.8 (03-28-25 @ 15:40), Max: 36.8 (03-28-25 @ 15:40)  T(F): 98.3 (03-28-25 @ 15:40), Max: 98.3 (03-28-25 @ 15:40)  HR: 76 (03-28-25 @ 15:40) (76 - 76)  BP: 91/51 (03-28-25 @ 15:40) (91/51 - 91/51)  BP(mean): --  RR: 16 (03-28-25 @ 15:40) (16 - 16)  SpO2: 96% (03-28-25 @ 15:40) (96% - 96%)  I&O's Summary    27 Mar 2025 07:01  -  28 Mar 2025 07:00  --------------------------------------------------------  IN: 360 mL / OUT: 855 mL / NET: -495 mL    28 Mar 2025 07:01  -  28 Mar 2025 17:05  --------------------------------------------------------  IN: 0 mL / OUT: 735 mL / NET: -735 mL        General: Pt Alert and oriented, NAD  DSG C/D/I- ACE wrap to RLE. Sun brace to RLE locked at 20 degrees of flexion  Abdomen: soft NTND  Pulses: 2+ DP pulses palpable bilaterally, skin wwp, cap refill brisk, no calf tenderness bilaterally. RLE moderately swollen, compartments soft and compressible. RLE elevated on pillows.  Sensation: Intact to light touch throughout LLE. No sensation to light touch throughout the plantar aspect of the right foot.   Motor: EHL/FHL/TA/GS 5/5 LLE. RLE: FHL/TA 0/5, EHL/GS 3/5.                          10.0   10.99 )-----------( 278      ( 28 Mar 2025 05:30 )             30.5     03-28    140  |  104  |  14  ----------------------------<  124[H]  4.1   |  25  |  0.45[L]    Ca    8.3[L]      28 Mar 2025 05:30        A/P: 53 y/o Female POD#1 s/p right popliteal sarcoma bed resection with tibial nerve resection and grafting on 3/27 with Dr. Villa  - Vitals reviewed, patient hypotensive  - AM labs reviewed, stable  - Appreciate medicine recommendations  - Pain Control  - OOB/IS  - Bowel Regimen  - RLE duplex ordered to r/o DVT  - [place] brace to RLE at 20 degrees flexion   - Elevation RLE  - DVT ppx: Lovenox 60mg SQ q12h given patient's significant risk for DVT  - Monitor KAYCE output  - Appreciate Plastics recs  - Appreciate Vascular recs  - SCD to right foot to improve swelling, left calf   - Compartment checks RLE  - PT, WBS: TTWB RLE  - Dispo: pending PT eval Ortho Note    Patient seen and examined at bedside on AM rounds. Pain controlled. Endorses numbness to the right foot. Denies CP, SOB, N/V.     Vital Signs Last 24 Hrs  T(C): 36.8 (03-28-25 @ 15:40), Max: 36.8 (03-28-25 @ 15:40)  T(F): 98.3 (03-28-25 @ 15:40), Max: 98.3 (03-28-25 @ 15:40)  HR: 76 (03-28-25 @ 15:40) (76 - 76)  BP: 91/51 (03-28-25 @ 15:40) (91/51 - 91/51)  BP(mean): --  RR: 16 (03-28-25 @ 15:40) (16 - 16)  SpO2: 96% (03-28-25 @ 15:40) (96% - 96%)  I&O's Summary    27 Mar 2025 07:01  -  28 Mar 2025 07:00  --------------------------------------------------------  IN: 360 mL / OUT: 855 mL / NET: -495 mL    28 Mar 2025 07:01  -  28 Mar 2025 17:05  --------------------------------------------------------  IN: 0 mL / OUT: 735 mL / NET: -735 mL        General: Pt Alert and oriented, NAD  DSG C/D/I- ACE wrap to RLE. Sun brace to RLE locked at 20 degrees of flexion  Abdomen: soft NTND  Pulses: 2+ DP pulses palpable bilaterally, skin wwp, cap refill brisk, no calf tenderness bilaterally. RLE moderately swollen, compartments soft and compressible. RLE elevated on pillows.  Sensation: Intact to light touch throughout LLE. No sensation to light touch throughout the plantar aspect of the right foot.   Motor: EHL/FHL/TA/GS 5/5 LLE. RLE: FHL/TA 0/5, EHL/GS 3/5.                          10.0   10.99 )-----------( 278      ( 28 Mar 2025 05:30 )             30.5     03-28    140  |  104  |  14  ----------------------------<  124[H]  4.1   |  25  |  0.45[L]    Ca    8.3[L]      28 Mar 2025 05:30        A/P: 53 y/o Female POD#1 s/p right popliteal sarcoma bed resection with tibial nerve resection and grafting on 3/27 with Dr. Villa  - Vitals reviewed, patient hypotensive  - AM labs reviewed, stable  - Appreciate medicine recommendations  - Pain Control  - OOB/IS  - Bowel Regimen  - RLE duplex ordered to r/o DVT - patient taken to US suite but imaging not taken due to dressing / brace, imaging re-ordered  - Sun brace to RLE at 20 degrees flexion   - Elevation RLE  - DVT ppx: Lovenox 60mg SQ q12h given patient's significant risk for DVT  - Monitor KAYCE output  - Appreciate Plastics recs  - Appreciate Vascular recs  - SCD to right foot to improve swelling, left calf   - Compartment checks RLE  - PT, WBS: TTWB RLE  - Dispo: pending PT eval

## 2025-03-28 NOTE — CONSULT NOTE ADULT - SUBJECTIVE AND OBJECTIVE BOX
HPI:  Patient is a 54y F reporting a "tumor" near posterior lateral right knee, which was removed in November 2024. Pathology showed sarcoma. Denies pain,  numbness, tingling. Ambulates without assist. Presents today for elective radical resection of right knee sarcoma, vasulcar reconstruction, nerve graft and reconstruction right leg nerve with nerve graft and local regional muscle flap.   (27 Mar 2025 09:10)    54F w RIGHT Post-lateral knee tumor s/p resection 11/2024 w Bx showing sarcoma, here for elective R knee sarcoma resection, vascular reconstruction, nerve graft reconstruction    #Post-op state - pain __. PPx: SQL. On bowel regimen and incentive spirometer  #R knee sarcoma   - tyelnol 1g q8,   - RPN: Oxyocodne 5/10 q3 for mod/severe pain    #Leukocytosis 11  #Acute blood loss anemia - 10 from baseline 13.4.     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 1000 milliGRAM(s) Oral every 8 hours  HYDROmorphone  Injectable 0.5 milliGRAM(s) IV Push every 15 minutes PRN breakthrough pain in PACU  ondansetron Injectable 4 milliGRAM(s) IV Push every 6 hours PRN Nausea and/or Vomiting  oxyCODONE    IR 5 milliGRAM(s) Oral every 3 hours PRN Moderate Pain (4 - 6)  oxyCODONE    IR 10 milliGRAM(s) Oral every 3 hours PRN Severe Pain (7 - 10)    Respiratory Medications    Cardiovascular Medications    Gastrointestinal Medications  lactated ringers. 1000 milliLiter(s) IV Continuous <Continuous>  magnesium hydroxide Suspension 30 milliLiter(s) Oral daily PRN Constipation  polyethylene glycol 3350 17 Gram(s) Oral at bedtime  senna 2 Tablet(s) Oral at bedtime    Genitourinary Medications    Hematologic/Oncologic Medications  enoxaparin Injectable 30 milliGRAM(s) SubCutaneous every 12 hours    Antimicrobial/Immunologic Medications    Endocrine/Metabolic Medications    Topical/Other Medications  BUpivacaine liposome 1.3% Injectable (no eMAR) 20 milliLiter(s) Local Injection once    --------------------------------------------------------------------------------------    VITAL SIGNS, INS/OUTS (last 24 hours):  --------------------------------------------------------------------------------------  ICU Vital Signs Last 24 Hrs  T(C): 36.7 (28 Mar 2025 05:29), Max: 36.7 (27 Mar 2025 19:15)  T(F): 98.1 (28 Mar 2025 05:29), Max: 98.1 (27 Mar 2025 19:15)  HR: 66 (28 Mar 2025 05:29) (55 - 79)  BP: 83/52 (28 Mar 2025 05:29) (83/52 - 111/63)  BP(mean): 73 (27 Mar 2025 18:08) (69 - 82)  ABP: 111/61 (27 Mar 2025 15:48) (102/53 - 114/63)  ABP(mean): 82 (27 Mar 2025 15:48) (73 - 86)  RR: 17 (28 Mar 2025 05:29) (9 - 40)  SpO2: 93% (28 Mar 2025 05:29) (93% - 100%)    O2 Parameters below as of 28 Mar 2025 05:29  Patient On (Oxygen Delivery Method): room air          I&O's Summary    27 Mar 2025 07:01  -  28 Mar 2025 07:00  --------------------------------------------------------  IN: 360 mL / OUT: 855 mL / NET: -495 mL    28 Mar 2025 07:01  -  28 Mar 2025 09:28  --------------------------------------------------------  IN: 0 mL / OUT: 400 mL / NET: -400 mL      --------------------------------------------------------------------------------------    EXAM:      LABS  --------------------------------------------------------------------------------------  Labs:  CAPILLARY BLOOD GLUCOSE                              10.0   10.99 )-----------( 278      ( 28 Mar 2025 05:30 )             30.5         03-28    140  |  104  |  14  ----------------------------<  124[H]  4.1   |  25  |  0.45[L]      Calcium: 8.3 mg/dL (03-28-25 @ 05:30)      LFTs:       ABG - ( 27 Mar 2025 08:54 )  pH: 7.42  /  pCO2: 38    /  pO2: 207   / HCO3: 25    / Base Excess: 0.2   /  SaO2: x                 Coags:            Urinalysis Basic - ( 28 Mar 2025 05:30 )    Color: x / Appearance: x / SG: x / pH: x  Gluc: 124 mg/dL / Ketone: x  / Bili: x / Urobili: x   Blood: x / Protein: x / Nitrite: x   Leuk Esterase: x / RBC: x / WBC x   Sq Epi: x / Non Sq Epi: x / Bacteria: x          --------------------------------------------------------------------------------------    OTHER LABS    IMAGING RESULTS  ****************       HPI:  Patient is a 54y F reporting a "tumor" near posterior lateral right knee, which was removed in November 2024. Pathology showed sarcoma. Denies pain,  numbness, tingling. Ambulates without assist. Presents today for elective radical resection of right knee sarcoma, vasulcar reconstruction, nerve graft and reconstruction right leg nerve with nerve graft and local regional muscle flap.   (27 Mar 2025 09:10)    54F w RIGHT Post-lateral knee tumor s/p resection 11/2024 w Bx showing sarcoma, here for elective R knee sarcoma resection, vascular reconstruction, nerve graft reconstruction    Pt in bed - resting comfortably. Denies LH/dizziness, chest pain, dyspnea. Denies fatigue. +flatus. Voiding. No N/V/abd pain. Reports decreased sensation in R foot. No fevers.     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 1000 milliGRAM(s) Oral every 8 hours  HYDROmorphone  Injectable 0.5 milliGRAM(s) IV Push every 15 minutes PRN breakthrough pain in PACU  ondansetron Injectable 4 milliGRAM(s) IV Push every 6 hours PRN Nausea and/or Vomiting  oxyCODONE    IR 5 milliGRAM(s) Oral every 3 hours PRN Moderate Pain (4 - 6)  oxyCODONE    IR 10 milliGRAM(s) Oral every 3 hours PRN Severe Pain (7 - 10)    Respiratory Medications    Cardiovascular Medications    Gastrointestinal Medications  lactated ringers. 1000 milliLiter(s) IV Continuous <Continuous>  magnesium hydroxide Suspension 30 milliLiter(s) Oral daily PRN Constipation  polyethylene glycol 3350 17 Gram(s) Oral at bedtime  senna 2 Tablet(s) Oral at bedtime    Genitourinary Medications    Hematologic/Oncologic Medications  enoxaparin Injectable 30 milliGRAM(s) SubCutaneous every 12 hours    Antimicrobial/Immunologic Medications    Endocrine/Metabolic Medications    Topical/Other Medications  BUpivacaine liposome 1.3% Injectable (no eMAR) 20 milliLiter(s) Local Injection once    --------------------------------------------------------------------------------------    VITAL SIGNS, INS/OUTS (last 24 hours):  --------------------------------------------------------------------------------------  ICU Vital Signs Last 24 Hrs  T(C): 36.7 (28 Mar 2025 05:29), Max: 36.7 (27 Mar 2025 19:15)  T(F): 98.1 (28 Mar 2025 05:29), Max: 98.1 (27 Mar 2025 19:15)  HR: 66 (28 Mar 2025 05:29) (55 - 79)  BP: 83/52 (28 Mar 2025 05:29) (83/52 - 111/63)  BP(mean): 73 (27 Mar 2025 18:08) (69 - 82)  ABP: 111/61 (27 Mar 2025 15:48) (102/53 - 114/63)  ABP(mean): 82 (27 Mar 2025 15:48) (73 - 86)  RR: 17 (28 Mar 2025 05:29) (9 - 40)  SpO2: 93% (28 Mar 2025 05:29) (93% - 100%)    O2 Parameters below as of 28 Mar 2025 05:29  Patient On (Oxygen Delivery Method): room air          I&O's Summary    27 Mar 2025 07:01  -  28 Mar 2025 07:00  --------------------------------------------------------  IN: 360 mL / OUT: 855 mL / NET: -495 mL    28 Mar 2025 07:01  -  28 Mar 2025 09:28  --------------------------------------------------------  IN: 0 mL / OUT: 400 mL / NET: -400 mL      --------------------------------------------------------------------------------------    EXAM:  GEN: female in NAD on RA  HEENT: NC/AT, MMM  CV: RRR, nml S1S2, no murmurs  PULM: nml effort, CTAB  ABD: Soft, non-distended, NABS, non-tender  NEURO  A/O x3, moving all extremities, Sensation intact  RLE in brace - no sensation in R foot. Plantarflex 5/5 b/l. Plantarextension 1/5 on RLE.  PSYCH: Appropriate      LABS  --------------------------------------------------------------------------------------  Labs:  CAPILLARY BLOOD GLUCOSE                              10.0   10.99 )-----------( 278      ( 28 Mar 2025 05:30 )             30.5         03-28    140  |  104  |  14  ----------------------------<  124[H]  4.1   |  25  |  0.45[L]      Calcium: 8.3 mg/dL (03-28-25 @ 05:30)      LFTs:       ABG - ( 27 Mar 2025 08:54 )  pH: 7.42  /  pCO2: 38    /  pO2: 207   / HCO3: 25    / Base Excess: 0.2   /  SaO2: x                 Coags:            Urinalysis Basic - ( 28 Mar 2025 05:30 )    Color: x / Appearance: x / SG: x / pH: x  Gluc: 124 mg/dL / Ketone: x  / Bili: x / Urobili: x   Blood: x / Protein: x / Nitrite: x   Leuk Esterase: x / RBC: x / WBC x   Sq Epi: x / Non Sq Epi: x / Bacteria: x          --------------------------------------------------------------------------------------    OTHER LABS    IMAGING RESULTS  ****************

## 2025-03-28 NOTE — CHART NOTE - NSCHARTNOTEFT_GEN_A_CORE
From vascular surgery perspective, please start therapeutic anticoagulation as soon as medically possible per primary team. Discussed with Dr. Coulter.

## 2025-03-28 NOTE — PROGRESS NOTE ADULT - SUBJECTIVE AND OBJECTIVE BOX
Subjective: patient seen and examined at bedside, no acute complaints. patient states she has some numbness at bottom of R foot which is expected. Patient states she does not have any pain and feels comfortable. Patients states she is trying to drink more fluids since her blood pressure is slightly low.    ROS: Denies headache, dizziness, blurred vision, chest pain, SOB, abdominal pain, nausea or vomiting       Social   acetaminophen     Tablet .. 1000 milliGRAM(s) Oral every 8 hours  BUpivacaine liposome 1.3% Injectable (no eMAR) 20 milliLiter(s) Local Injection once  enoxaparin Injectable 30 milliGRAM(s) SubCutaneous every 12 hours  gabapentin 300 milliGRAM(s) Oral every 8 hours  HYDROmorphone  Injectable 0.5 milliGRAM(s) IV Push every 15 minutes PRN  lactated ringers. 1000 milliLiter(s) IV Continuous <Continuous>  magnesium hydroxide Suspension 30 milliLiter(s) Oral daily PRN  ondansetron Injectable 4 milliGRAM(s) IV Push every 6 hours PRN  oxyCODONE    IR 5 milliGRAM(s) Oral every 3 hours PRN  oxyCODONE    IR 10 milliGRAM(s) Oral every 3 hours PRN  polyethylene glycol 3350 17 Gram(s) Oral at bedtime  senna 2 Tablet(s) Oral at bedtime  acetaminophen     Tablet .. 1000 milliGRAM(s) Oral every 8 hours  BUpivacaine liposome 1.3% Injectable (no eMAR) 20 milliLiter(s) Local Injection once  enoxaparin Injectable 30 milliGRAM(s) SubCutaneous every 12 hours  gabapentin 300 milliGRAM(s) Oral every 8 hours  HYDROmorphone  Injectable 0.5 milliGRAM(s) IV Push every 15 minutes PRN  lactated ringers. 1000 milliLiter(s) IV Continuous <Continuous>  magnesium hydroxide Suspension 30 milliLiter(s) Oral daily PRN  ondansetron Injectable 4 milliGRAM(s) IV Push every 6 hours PRN  oxyCODONE    IR 5 milliGRAM(s) Oral every 3 hours PRN  oxyCODONE    IR 10 milliGRAM(s) Oral every 3 hours PRN  polyethylene glycol 3350 17 Gram(s) Oral at bedtime  senna 2 Tablet(s) Oral at bedtime      Allergies    No Known Allergies    Intolerances        Vital Signs Last 24 Hrs  T(C): 37 (28 Mar 2025 09:37), Max: 37 (28 Mar 2025 09:37)  T(F): 98.6 (28 Mar 2025 09:37), Max: 98.6 (28 Mar 2025 09:37)  HR: 69 (28 Mar 2025 09:37) (55 - 79)  BP: 94/57 (28 Mar 2025 09:37) (83/52 - 111/63)  BP(mean): 73 (27 Mar 2025 18:08) (69 - 82)  RR: 16 (28 Mar 2025 09:37) (9 - 40)  SpO2: 97% (28 Mar 2025 09:37) (93% - 100%)    Parameters below as of 28 Mar 2025 09:37  Patient On (Oxygen Delivery Method): room air      I&O's Summary    27 Mar 2025 07:01  -  28 Mar 2025 07:00  --------------------------------------------------------  IN: 360 mL / OUT: 855 mL / NET: -495 mL    28 Mar 2025 07:01  -  28 Mar 2025 10:55  --------------------------------------------------------  IN: 0 mL / OUT: 400 mL / NET: -400 mL          Physical Exam:  General: NAD, resting comfortably in bed  Pulmonary: No respiratory distress, nonlabored breathing, on room air  Cardiovascular: NSR  Abdominal: Soft, NT, ND  Extremities:   - RLE: knee brace in place, dressing c/d/i, KAYCE drain x1 with serosang output. R foot with some edema. RLE with decreased sensation to light touch over plantar aspect of right foot, however sensation intact over dorsal aspect of right foot. Able to plantarflex and dorsiflex, and wiggle toes. Compartments soft.  - LLE WWP, no edema. motor/sensory grossly intact.  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    Recommendations:  - Continue neurovasc checks, compartment checks  - Appreciate primary team care  - Call [phone] with any questions or concerns

## 2025-03-28 NOTE — PROGRESS NOTE ADULT - SUBJECTIVE AND OBJECTIVE BOX
SUBJECTIVE: Pt seen and examined on morning rounds. Pt reports new onset numbness of the distal posterior leg, as expected. Pt denies cp/sob/n/v/ha. Pt endorses good pain control.    Vital Signs Last 24 Hrs  T(C): 37 (28 Mar 2025 09:37), Max: 37 (28 Mar 2025 09:37)  T(F): 98.6 (28 Mar 2025 09:37), Max: 98.6 (28 Mar 2025 09:37)  HR: 69 (28 Mar 2025 09:37) (55 - 79)  BP: 94/57 (28 Mar 2025 09:37) (83/52 - 111/63)  BP(mean): 73 (27 Mar 2025 18:08) (69 - 82)  RR: 16 (28 Mar 2025 09:37) (9 - 40)  SpO2: 97% (28 Mar 2025 09:37) (93% - 100%)    Parameters below as of 28 Mar 2025 09:37  Patient On (Oxygen Delivery Method): room air    Physical Exam:  General: NAD, resting comfortably in bed  RLE: Hinged knee brace in place, locked in 20deg flexion.  JPx1 holding suction  SILT grossly SPN/DPN/Saph, Noah/Tib not intact  Motor 4+/5 TA/EHL, 0/5 GS/FHL  Pulses 2+ DP    LLE:  SILT SPN/DPN/Saph/Noah/Tib  Motor 5/5 TA/GS/EHL/FHL  Pulses 2+      Assessment/Plan:  54yF s/p right knee popliteal sarcoma bed resection by Dr. ABIOLA Villa on 03-27   - Stable  - Pain Control  - Serial motor exams  - KAYCE x1 in place - monitor output  - DVT ppx: SCDs, Lovenox 30 q12hr  - PT, WBS: toe touch weight bearing RLE  - Dispo: pending PT eval

## 2025-03-28 NOTE — CONSULT NOTE ADULT - ASSESSMENT
54F w RIGHT Post-lateral knee tumor s/p resection 11/2024 w Bx showing sarcoma, here for elective R knee sarcoma resection, vascular reconstruction, nerve graft reconstruction    #Post-op state - pain __. PPx: SQL. On bowel regimen and incentive spirometer  #R knee sarcoma   - tyelnol 1g q8,   - RPN: Oxyocodne 5/10 q3 for mod/severe pain    #Leukocytosis 11  #Acute blood loss anemia - 10 from baseline 13.4.     #Hypotension - 80/50 this AM  Plan  RLE Doppler - needs to be ordered urgent -- concern for dissection  INCOMPLETE  AKYCE Drain 54F w RIGHT Post-lateral knee tumor s/p resection 11/2024 w Bx showing sarcoma, here for elective R knee sarcoma resection, vascular reconstruction, nerve graft reconstruction    #Post-op state - pain controlled. PPx: SQL. On bowel regimen and incentive spirometer  #R knee sarcoma   - tyelnol 1g q8,   - RPN: Oxyocodne 5/10 q3 for mod/severe pain    #Leukocytosis 11  likely reactive - pt afebrile and non-toxic appearing  #Acute blood loss anemia - 10 from baseline 13.4. Asymptomatic    #Hypotension - 80/50 this AM. clinically asymptomatic    Plan  f/u RLE Doppler - changed priority to be ordered urgent   KAYCE Drain  Monitor for orthostasis, symptomatic anemia  PT and wt bearing status per orthopedics  CBC w diff, BMP in AM  Above d/w ortho NP Mago

## 2025-03-28 NOTE — PROGRESS NOTE ADULT - SUBJECTIVE AND OBJECTIVE BOX
Plastic Surgery Progress Note    Subjective  - Pt seen and examined on Rounds  - pt reports good pain control    Objective  GEN: NAD, comfortable  RESP: No increased WOB  RLE: Dressing in place c/d/i pt in knee immobilizer in slightly flexed position.  KAYCE drain SS    Vital Signs  T(C): 36.7 (03-28-25 @ 05:29), Max: 36.7 (03-27-25 @ 19:15)  T(F): 98.1 (03-28-25 @ 05:29), Max: 98.1 (03-27-25 @ 19:15)  HR: 66 (03-28-25 @ 05:29) (55 - 79)  BP: 83/52 (03-28-25 @ 05:29) (83/52 - 111/63)  RR: 17 (03-28-25 @ 05:29) (9 - 40)  SpO2: 93% (03-28-25 @ 05:29) (93% - 100%)      27 Mar 2025 07:01  -  28 Mar 2025 07:00  --------------------------------------------------------  IN:    Oral Fluid: 360 mL  Total IN: 360 mL    OUT:    Blood Loss (mL): 55 mL    Voided (mL): 800 mL  Total OUT: 855 mL    Total NET: -495 mL      28 Mar 2025 07:01  -  28 Mar 2025 09:05  --------------------------------------------------------  IN:  Total IN: 0 mL    OUT:    Voided (mL): 400 mL  Total OUT: 400 mL    Total NET: -400 mL

## 2025-03-29 LAB
ANION GAP SERPL CALC-SCNC: 9 MMOL/L — SIGNIFICANT CHANGE UP (ref 5–17)
BUN SERPL-MCNC: 10 MG/DL — SIGNIFICANT CHANGE UP (ref 7–23)
CALCIUM SERPL-MCNC: 8.6 MG/DL — SIGNIFICANT CHANGE UP (ref 8.4–10.5)
CHLORIDE SERPL-SCNC: 106 MMOL/L — SIGNIFICANT CHANGE UP (ref 96–108)
CO2 SERPL-SCNC: 27 MMOL/L — SIGNIFICANT CHANGE UP (ref 22–31)
CREAT SERPL-MCNC: 0.38 MG/DL — LOW (ref 0.5–1.3)
EGFR: 120 ML/MIN/1.73M2 — SIGNIFICANT CHANGE UP
EGFR: 120 ML/MIN/1.73M2 — SIGNIFICANT CHANGE UP
GLUCOSE SERPL-MCNC: 94 MG/DL — SIGNIFICANT CHANGE UP (ref 70–99)
HCT VFR BLD CALC: 31 % — LOW (ref 34.5–45)
HGB BLD-MCNC: 10.1 G/DL — LOW (ref 11.5–15.5)
MCHC RBC-ENTMCNC: 29.4 PG — SIGNIFICANT CHANGE UP (ref 27–34)
MCHC RBC-ENTMCNC: 32.6 G/DL — SIGNIFICANT CHANGE UP (ref 32–36)
MCV RBC AUTO: 90.4 FL — SIGNIFICANT CHANGE UP (ref 80–100)
NRBC BLD AUTO-RTO: 0 /100 WBCS — SIGNIFICANT CHANGE UP (ref 0–0)
PLATELET # BLD AUTO: 282 K/UL — SIGNIFICANT CHANGE UP (ref 150–400)
POTASSIUM SERPL-MCNC: 4 MMOL/L — SIGNIFICANT CHANGE UP (ref 3.5–5.3)
POTASSIUM SERPL-SCNC: 4 MMOL/L — SIGNIFICANT CHANGE UP (ref 3.5–5.3)
RBC # BLD: 3.43 M/UL — LOW (ref 3.8–5.2)
RBC # FLD: 13.6 % — SIGNIFICANT CHANGE UP (ref 10.3–14.5)
SODIUM SERPL-SCNC: 142 MMOL/L — SIGNIFICANT CHANGE UP (ref 135–145)
WBC # BLD: 7.48 K/UL — SIGNIFICANT CHANGE UP (ref 3.8–10.5)
WBC # FLD AUTO: 7.48 K/UL — SIGNIFICANT CHANGE UP (ref 3.8–10.5)

## 2025-03-29 PROCEDURE — 93971 EXTREMITY STUDY: CPT | Mod: 26,RT

## 2025-03-29 PROCEDURE — 71250 CT THORAX DX C-: CPT | Mod: 26

## 2025-03-29 PROCEDURE — 99232 SBSQ HOSP IP/OBS MODERATE 35: CPT

## 2025-03-29 RX ADMIN — ENOXAPARIN SODIUM 60 MILLIGRAM(S): 100 INJECTION SUBCUTANEOUS at 17:24

## 2025-03-29 RX ADMIN — Medication 1000 MILLIGRAM(S): at 15:33

## 2025-03-29 RX ADMIN — GABAPENTIN 300 MILLIGRAM(S): 400 CAPSULE ORAL at 05:45

## 2025-03-29 RX ADMIN — POLYETHYLENE GLYCOL 3350 17 GRAM(S): 17 POWDER, FOR SOLUTION ORAL at 21:06

## 2025-03-29 RX ADMIN — ENOXAPARIN SODIUM 60 MILLIGRAM(S): 100 INJECTION SUBCUTANEOUS at 05:45

## 2025-03-29 RX ADMIN — Medication 1000 MILLIGRAM(S): at 05:45

## 2025-03-29 RX ADMIN — Medication 2 TABLET(S): at 21:06

## 2025-03-29 RX ADMIN — GABAPENTIN 300 MILLIGRAM(S): 400 CAPSULE ORAL at 21:06

## 2025-03-29 RX ADMIN — Medication 1000 MILLIGRAM(S): at 21:06

## 2025-03-29 RX ADMIN — GABAPENTIN 300 MILLIGRAM(S): 400 CAPSULE ORAL at 15:33

## 2025-03-29 NOTE — PROGRESS NOTE ADULT - SUBJECTIVE AND OBJECTIVE BOX
INTERVAL HPI/OVERNIGHT EVENTS: eric o/n    SUBJECTIVE: Patient seen and examined at bedside.   reports continued decreased sensation in R foot. No LH/dizziness, fatigue, chest pain, dyspnea. States baseline -110s.  Eating wo N/V/abd pain. +flatus. Voiding. No fevers.    OBJECTIVE:    VITAL SIGNS:  ICU Vital Signs Last 24 Hrs  T(C): 36.8 (29 Mar 2025 08:52), Max: 36.8 (28 Mar 2025 15:40)  T(F): 98.2 (29 Mar 2025 08:52), Max: 98.3 (28 Mar 2025 15:40)  HR: 74 (29 Mar 2025 08:52) (74 - 83)  BP: 92/53 (29 Mar 2025 08:52) (90/52 - 103/65)  BP(mean): --  ABP: --  ABP(mean): --  RR: 17 (29 Mar 2025 08:52) (16 - 17)  SpO2: 97% (29 Mar 2025 08:52) (95% - 97%)    O2 Parameters below as of 29 Mar 2025 08:52  Patient On (Oxygen Delivery Method): room air              03-28 @ 07:01  -  03-29 @ 07:00  --------------------------------------------------------  IN: 0 mL / OUT: 780 mL / NET: -780 mL      CAPILLARY BLOOD GLUCOSE          PHYSICAL EXAM:  GEN: female in NAD on RA  HEENT: NC/AT, MMM  CV: RRR, nml S1S2, no murmurs  PULM: nml effort, CTAB  ABD: Soft, non-distended, NABS, non-tender  NEURO  A/O x3, moving all extremities, Sensation intact  RLE in brace - no sensation in R foot. Plantarflex 5/5 b/l. Plantarextension 1/5 on RLE.  PSYCH: Appropriate    MEDICATIONS:  MEDICATIONS  (STANDING):  acetaminophen     Tablet .. 1000 milliGRAM(s) Oral every 8 hours  BUpivacaine liposome 1.3% Injectable (no eMAR) 20 milliLiter(s) Local Injection once  enoxaparin Injectable 60 milliGRAM(s) SubCutaneous every 12 hours  gabapentin 300 milliGRAM(s) Oral every 8 hours  lactated ringers. 1000 milliLiter(s) (100 mL/Hr) IV Continuous <Continuous>  polyethylene glycol 3350 17 Gram(s) Oral at bedtime  senna 2 Tablet(s) Oral at bedtime    MEDICATIONS  (PRN):  HYDROmorphone  Injectable 0.5 milliGRAM(s) IV Push every 15 minutes PRN breakthrough pain in PACU  magnesium hydroxide Suspension 30 milliLiter(s) Oral daily PRN Constipation  ondansetron Injectable 4 milliGRAM(s) IV Push every 6 hours PRN Nausea and/or Vomiting  oxyCODONE    IR 5 milliGRAM(s) Oral every 3 hours PRN Moderate Pain (4 - 6)  oxyCODONE    IR 10 milliGRAM(s) Oral every 3 hours PRN Severe Pain (7 - 10)      ALLERGIES:  Allergies    No Known Allergies    Intolerances        LABS:                        10.1   7.48  )-----------( 282      ( 29 Mar 2025 05:30 )             31.0     03-29    142  |  106  |  10  ----------------------------<  94  4.0   |  27  |  0.38[L]    Ca    8.6      29 Mar 2025 05:30        Urinalysis Basic - ( 29 Mar 2025 05:30 )    Color: x / Appearance: x / SG: x / pH: x  Gluc: 94 mg/dL / Ketone: x  / Bili: x / Urobili: x   Blood: x / Protein: x / Nitrite: x   Leuk Esterase: x / RBC: x / WBC x   Sq Epi: x / Non Sq Epi: x / Bacteria: x        RADIOLOGY & ADDITIONAL TESTS: Reviewed.

## 2025-03-29 NOTE — PROGRESS NOTE ADULT - SUBJECTIVE AND OBJECTIVE BOX
SUBJECTIVE: Pt seen and examined on morning rounds. Pt reports good pain control and continued numbness of plantar foot. Pt denies cp/sob/n/v/ha    Vital Signs Last 24 Hrs  T(C): 37.4 (29 Mar 2025 16:05), Max: 37.4 (29 Mar 2025 16:05)  T(F): 99.3 (29 Mar 2025 16:05), Max: 99.3 (29 Mar 2025 16:05)  HR: 71 (29 Mar 2025 16:55) (71 - 83)  BP: 113/62 (29 Mar 2025 16:55) (90/52 - 113/62)  BP(mean): --  RR: 16 (29 Mar 2025 16:05) (16 - 17)  SpO2: 95% (29 Mar 2025 16:55) (95% - 97%)    Parameters below as of 29 Mar 2025 16:55  Patient On (Oxygen Delivery Method): room air        Physical Exam:  General: NAD, resting comfortably in bed  RLE: Hinged knee brace in place, locked in 20deg flexion.  JPx1 holding suction  SILT grossly SPN/DPN/Saph, Noah/Tib not intact  Motor 4+/5 TA/EHL, 0/5 GS/FHL  Rfoot wwp, <2sec cap refill in all toes    Assessment/Plan:  54yF s/p right knee popliteal sarcoma bed resection by Dr. ABIOLA Villa on 03-27   - Stable  - Pain Control  - Serial motor exams  - KAYCE x1 in place - monitor output  - DVT ppx: SCDs, Lovenox 60 q12hr  - F/u doppler RLE  - f/u pt eval  - WBS: toe touch weight bearing RLE  - Dispo: pending PT eval

## 2025-03-29 NOTE — PHYSICAL THERAPY INITIAL EVALUATION ADULT - PERTINENT HX OF CURRENT PROBLEM, REHAB EVAL
54y F reporting a "tumor" near posterior lateral right knee, which was removed in November 2024. Pathology showed sarcoma. Denies pain,  numbness, tingling. Ambulates without assist. Presents today for elective radical resection of right knee sarcoma, vascular reconstruction, nerve graft and reconstruction right leg nerve with nerve graft and local regional muscle flap.

## 2025-03-29 NOTE — PROGRESS NOTE ADULT - SUBJECTIVE AND OBJECTIVE BOX
Plastic Surgery Progress Note    Subjective  - ATNNER    Objective    RESP: No increased WOB  RLE: Dressing in place c/d/i pt in knee immobilizer in slightly flexed position.  KAYCE drain SS. Sensation in SP/S nerve diminished.    Vital Signs Last 24 Hrs  T(C): 36.5 (29 Mar 2025 05:05), Max: 36.8 (28 Mar 2025 15:40)  T(F): 97.7 (29 Mar 2025 05:05), Max: 98.3 (28 Mar 2025 15:40)  HR: 83 (29 Mar 2025 05:05) (76 - 83)  BP: 103/65 (29 Mar 2025 05:05) (90/52 - 103/65)  BP(mean): --  RR: 17 (29 Mar 2025 05:05) (16 - 17)  SpO2: 97% (29 Mar 2025 05:05) (95% - 97%)    Parameters below as of 29 Mar 2025 05:05  Patient On (Oxygen Delivery Method): room air      I&O's Detail    28 Mar 2025 07:01  -  29 Mar 2025 07:00  --------------------------------------------------------  IN:  Total IN: 0 mL    OUT:    Blood Loss (mL): 80 mL    Voided (mL): 700 mL  Total OUT: 780 mL    Total NET: -780 mL

## 2025-03-29 NOTE — PHYSICAL THERAPY INITIAL EVALUATION ADULT - GENERAL OBSERVATIONS, REHAB EVAL
Patient received semi-armas in bed  in NAD on RA, +SCDs, +Heplock, +Sioux in 20 degrees of knee flexion. Cleared and pre-medicated by CARTER Oleary. Agreeable to PT.

## 2025-03-29 NOTE — PHYSICAL THERAPY INITIAL EVALUATION ADULT - ADDITIONAL COMMENTS
Patient lives with spouse and two children in private house with 5 steps to enter and multiple steps inside. Does not use any Assistive Devices at baseline. Denies recent Hx of falls.

## 2025-03-29 NOTE — PHYSICAL THERAPY INITIAL EVALUATION ADULT - RANGE OF MOTION EXAMINATION, REHAB EVAL
except for right knee with shubham locked in 20 degrees of knee flexion/no ROM deficits were identified

## 2025-03-29 NOTE — PHYSICAL THERAPY INITIAL EVALUATION ADULT - MANUAL MUSCLE TESTING RESULTS, REHAB EVAL
B UE and B LE >3+/5 upon functional assessment against gravity except for right knee (not tested) and impairment with PF, inversion and toes flexion/grossly assessed due to

## 2025-03-30 LAB
ANION GAP SERPL CALC-SCNC: 14 MMOL/L — SIGNIFICANT CHANGE UP (ref 5–17)
BUN SERPL-MCNC: 12 MG/DL — SIGNIFICANT CHANGE UP (ref 7–23)
CALCIUM SERPL-MCNC: 8.9 MG/DL — SIGNIFICANT CHANGE UP (ref 8.4–10.5)
CHLORIDE SERPL-SCNC: 98 MMOL/L — SIGNIFICANT CHANGE UP (ref 96–108)
CO2 SERPL-SCNC: 26 MMOL/L — SIGNIFICANT CHANGE UP (ref 22–31)
CREAT SERPL-MCNC: 0.33 MG/DL — LOW (ref 0.5–1.3)
EGFR: 125 ML/MIN/1.73M2 — SIGNIFICANT CHANGE UP
EGFR: 125 ML/MIN/1.73M2 — SIGNIFICANT CHANGE UP
GLUCOSE SERPL-MCNC: 100 MG/DL — HIGH (ref 70–99)
HCT VFR BLD CALC: 34.1 % — LOW (ref 34.5–45)
HGB BLD-MCNC: 11.6 G/DL — SIGNIFICANT CHANGE UP (ref 11.5–15.5)
MCHC RBC-ENTMCNC: 29.7 PG — SIGNIFICANT CHANGE UP (ref 27–34)
MCHC RBC-ENTMCNC: 34 G/DL — SIGNIFICANT CHANGE UP (ref 32–36)
MCV RBC AUTO: 87.4 FL — SIGNIFICANT CHANGE UP (ref 80–100)
NRBC BLD AUTO-RTO: 0 /100 WBCS — SIGNIFICANT CHANGE UP (ref 0–0)
PLATELET # BLD AUTO: 309 K/UL — SIGNIFICANT CHANGE UP (ref 150–400)
POTASSIUM SERPL-MCNC: 3.9 MMOL/L — SIGNIFICANT CHANGE UP (ref 3.5–5.3)
POTASSIUM SERPL-SCNC: 3.9 MMOL/L — SIGNIFICANT CHANGE UP (ref 3.5–5.3)
RBC # BLD: 3.9 M/UL — SIGNIFICANT CHANGE UP (ref 3.8–5.2)
RBC # FLD: 13.5 % — SIGNIFICANT CHANGE UP (ref 10.3–14.5)
SODIUM SERPL-SCNC: 138 MMOL/L — SIGNIFICANT CHANGE UP (ref 135–145)
WBC # BLD: 7.25 K/UL — SIGNIFICANT CHANGE UP (ref 3.8–10.5)
WBC # FLD AUTO: 7.25 K/UL — SIGNIFICANT CHANGE UP (ref 3.8–10.5)

## 2025-03-30 PROCEDURE — 99232 SBSQ HOSP IP/OBS MODERATE 35: CPT

## 2025-03-30 RX ADMIN — GABAPENTIN 300 MILLIGRAM(S): 400 CAPSULE ORAL at 05:25

## 2025-03-30 RX ADMIN — Medication 1000 MILLIGRAM(S): at 13:27

## 2025-03-30 RX ADMIN — Medication 4 MILLIGRAM(S): at 22:27

## 2025-03-30 RX ADMIN — ENOXAPARIN SODIUM 60 MILLIGRAM(S): 100 INJECTION SUBCUTANEOUS at 05:25

## 2025-03-30 RX ADMIN — GABAPENTIN 300 MILLIGRAM(S): 400 CAPSULE ORAL at 21:10

## 2025-03-30 RX ADMIN — Medication 1000 MILLIGRAM(S): at 05:25

## 2025-03-30 RX ADMIN — POLYETHYLENE GLYCOL 3350 17 GRAM(S): 17 POWDER, FOR SOLUTION ORAL at 21:10

## 2025-03-30 RX ADMIN — ENOXAPARIN SODIUM 60 MILLIGRAM(S): 100 INJECTION SUBCUTANEOUS at 17:05

## 2025-03-30 RX ADMIN — Medication 1000 MILLIGRAM(S): at 21:10

## 2025-03-30 RX ADMIN — Medication 2 TABLET(S): at 21:10

## 2025-03-30 RX ADMIN — GABAPENTIN 300 MILLIGRAM(S): 400 CAPSULE ORAL at 13:27

## 2025-03-30 RX ADMIN — MAGNESIUM HYDROXIDE 30 MILLILITER(S): 400 SUSPENSION ORAL at 21:10

## 2025-03-30 NOTE — PROGRESS NOTE ADULT - SUBJECTIVE AND OBJECTIVE BOX
SUBJECTIVE: Pt seen and examined on morning rounds. Pt reports continued R foot plantar numbness. Pt denies cp/sob/n/v/ha    Vital Signs Last 24 Hrs  T(C): 36.5 (30 Mar 2025 05:25), Max: 37.4 (29 Mar 2025 16:05)  T(F): 97.7 (30 Mar 2025 05:25), Max: 99.3 (29 Mar 2025 16:05)  HR: 63 (30 Mar 2025 05:25) (63 - 92)  BP: 98/57 (30 Mar 2025 05:25) (88/56 - 113/62)  BP(mean): --  RR: 18 (30 Mar 2025 05:25) (16 - 18)  SpO2: 99% (30 Mar 2025 05:25) (95% - 99%)    Parameters below as of 30 Mar 2025 05:25  Patient On (Oxygen Delivery Method): room air    Physical Exam:  General: NAD, resting comfortably in bed  RLE: Hinged knee brace in place, locked in 20deg flexion.  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   - Stable  - Pain Control  - KAYCE x1 in place - monitor output  - DVT ppx: SCDs, Lovenox 60 q12hr  - Limited doppler RLE negative for DVT  - CT chest significant for Small calcified lung nodules and right upper lobe bronchiectasis, likely from prior granulomatous infection  - PT rec home pt pending stair navigation  - WBS: toe touch weight bearing RLE  - Dispo: HPT

## 2025-03-30 NOTE — OCCUPATIONAL THERAPY INITIAL EVALUATION ADULT - ASSISTIVE DEVICE FOR TRANSFER: SIT/STAND, REHAB EVAL
rolling walker Tremfya Counseling: I discussed with the patient the risks of guselkumab including but not limited to immunosuppression, serious infections, and drug reactions.  The patient understands that monitoring is required including a PPD at baseline and must alert us or the primary physician if symptoms of infection or other concerning signs are noted.

## 2025-03-30 NOTE — OCCUPATIONAL THERAPY INITIAL EVALUATION ADULT - MANUAL MUSCLE TESTING RESULTS, REHAB EVAL
grossly assessed due to  B UE and B LE >3+/5 upon functional assessment against gravity except for right knee (not tested) and impairment with PF, inversion and toes flexion

## 2025-03-30 NOTE — OCCUPATIONAL THERAPY INITIAL EVALUATION ADULT - IMPAIRED TRANSFERS: SIT/STAND, REHAB EVAL
impaired balance/decreased flexibility/impaired postural control/decreased ROM/decreased sensation/decreased strength

## 2025-03-30 NOTE — OCCUPATIONAL THERAPY INITIAL EVALUATION ADULT - DIAGNOSIS, OT EVAL
Pt p/w impaired RLE ROM, decreased RLE sensation, decreased RLE strength, impaired balance, and decreased functional activity tolerance, impacting ability to perform functional mobility/ADLs.

## 2025-03-30 NOTE — OCCUPATIONAL THERAPY INITIAL EVALUATION ADULT - GENERAL OBSERVATIONS, REHAB EVAL
OT IE completed. Orders received, chart reviewed, pt cleared for OT by CARTER Oleary. Pt received ambulating in room with PT HEIDI Pierre, +alex, +MARIBELL JONES (locked in 20deg flexion), +KAYCE. Pt A&Ox4, agreeable to OT, and tolerated session well.

## 2025-03-30 NOTE — OCCUPATIONAL THERAPY INITIAL EVALUATION ADULT - ADDITIONAL COMMENTS
Pt lives in private home with spouse and children with 3 HAFSA. Pt has additional flight of stairs to second level of home, however reporting that she plans to stay on ground floor s/p discharge from [facility]. PTA, pt able to perform all mobility/ADLs independently and w/o use of AD/AE. Pt has tub-shower.

## 2025-03-30 NOTE — PROGRESS NOTE ADULT - SUBJECTIVE AND OBJECTIVE BOX
Plastic Surgery Progress Note    Subjective  - TANNER  - Duplex negative for DVT    Objective    Vital Signs Last 24 Hrs  T(C): 36.5 (30 Mar 2025 05:25), Max: 37.4 (29 Mar 2025 16:05)  T(F): 97.7 (30 Mar 2025 05:25), Max: 99.3 (29 Mar 2025 16:05)  HR: 63 (30 Mar 2025 05:25) (63 - 92)  BP: 98/57 (30 Mar 2025 05:25) (88/56 - 113/62)  BP(mean): --  RR: 18 (30 Mar 2025 05:25) (16 - 18)  SpO2: 99% (30 Mar 2025 05:25) (95% - 99%)    Parameters below as of 30 Mar 2025 05:25  Patient On (Oxygen Delivery Method): room air          I&O's Detail    29 Mar 2025 07:01  -  30 Mar 2025 07:00  --------------------------------------------------------  IN:  Total IN: 0 mL    OUT:    Blood Loss (mL): 15 mL    Bulb (mL): 9 mL    Voided (mL): 900 mL  Total OUT: 924 mL    Total NET: -924 mL

## 2025-03-30 NOTE — PROGRESS NOTE ADULT - SUBJECTIVE AND OBJECTIVE BOX
INTERVAL HPI/OVERNIGHT EVENTS: eric o/n    SUBJECTIVE: Patient seen and examined at bedside.   Pain is controlled. Still limited sensation in L foot. Walked w PT - no LH/dizziness, chest pain, dyspnea. Eating wo N/V/abd pain.    OBJECTIVE:    VITAL SIGNS:  ICU Vital Signs Last 24 Hrs  T(C): 37 (30 Mar 2025 08:55), Max: 37.4 (29 Mar 2025 16:05)  T(F): 98.6 (30 Mar 2025 08:55), Max: 99.3 (29 Mar 2025 16:05)  HR: 88 (30 Mar 2025 09:20) (63 - 92)  BP: 114/75 (30 Mar 2025 09:20) (88/56 - 114/75)  BP(mean): --  ABP: --  ABP(mean): --  RR: 17 (30 Mar 2025 08:55) (16 - 18)  SpO2: 97% (30 Mar 2025 09:20) (94% - 99%)    O2 Parameters below as of 30 Mar 2025 09:20  Patient On (Oxygen Delivery Method): room air              03-29 @ 07:01  -  03-30 @ 07:00  --------------------------------------------------------  IN: 0 mL / OUT: 924 mL / NET: -924 mL      CAPILLARY BLOOD GLUCOSE          PHYSICAL EXAM:  GEN: female in NAD on RA  HEENT: NC/AT, MMM  CV: RRR, nml S1S2, no murmurs  PULM: nml effort, CTAB  ABD: Soft, non-distended, NABS, non-tender  NEURO  A/O x3, moving all extremities, Sensation intact  RLE in brace - no sensation in R foot. Plantarflex 5/5 b/l. Plantarextension 1/5 on RLE.  PSYCH: Appropriate      MEDICATIONS:  MEDICATIONS  (STANDING):  acetaminophen     Tablet .. 1000 milliGRAM(s) Oral every 8 hours  BUpivacaine liposome 1.3% Injectable (no eMAR) 20 milliLiter(s) Local Injection once  enoxaparin Injectable 60 milliGRAM(s) SubCutaneous every 12 hours  gabapentin 300 milliGRAM(s) Oral every 8 hours  lactated ringers. 1000 milliLiter(s) (100 mL/Hr) IV Continuous <Continuous>  polyethylene glycol 3350 17 Gram(s) Oral at bedtime  senna 2 Tablet(s) Oral at bedtime    MEDICATIONS  (PRN):  HYDROmorphone  Injectable 0.5 milliGRAM(s) IV Push every 15 minutes PRN breakthrough pain in PACU  magnesium hydroxide Suspension 30 milliLiter(s) Oral daily PRN Constipation  ondansetron Injectable 4 milliGRAM(s) IV Push every 6 hours PRN Nausea and/or Vomiting  oxyCODONE    IR 5 milliGRAM(s) Oral every 3 hours PRN Moderate Pain (4 - 6)  oxyCODONE    IR 10 milliGRAM(s) Oral every 3 hours PRN Severe Pain (7 - 10)      ALLERGIES:  Allergies    No Known Allergies    Intolerances        LABS:                        11.6   7.25  )-----------( 309      ( 30 Mar 2025 10:00 )             34.1     03-30    138  |  98  |  12  ----------------------------<  100[H]  3.9   |  26  |  0.33[L]    Ca    8.9      30 Mar 2025 10:00        Urinalysis Basic - ( 30 Mar 2025 10:00 )    Color: x / Appearance: x / SG: x / pH: x  Gluc: 100 mg/dL / Ketone: x  / Bili: x / Urobili: x   Blood: x / Protein: x / Nitrite: x   Leuk Esterase: x / RBC: x / WBC x   Sq Epi: x / Non Sq Epi: x / Bacteria: x        RADIOLOGY & ADDITIONAL TESTS: Reviewed.

## 2025-03-30 NOTE — OCCUPATIONAL THERAPY INITIAL EVALUATION ADULT - MODALITIES TREATMENT COMMENTS
Pt able to perform toilet transfer with CGAx1 (using commode placed over toilet) 2/2 impaired strength, RLE TTWB, and RLE in KI, impacting ability to weight shift. Pt able to ambulate in room/hallway with CGAx1 using RW, with pt maintaining RLE NWB t/o 2/2 inability to feel floor with R foot 2/2 severely impaired sensation. Pt required 1 seated rest break in hallway 2/2 fatigue and dizziness, VSS. Pt returned to room and performed UB dressing and bed mobility with supervision. Pt left semi supine in bed, +all lines, +call bell, +RLE elevated and in KI, NAD.

## 2025-03-30 NOTE — OCCUPATIONAL THERAPY INITIAL EVALUATION ADULT - PERTINENT HX OF CURRENT PROBLEM, REHAB EVAL
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

## 2025-03-30 NOTE — OCCUPATIONAL THERAPY INITIAL EVALUATION ADULT - RANGE OF MOTION EXAMINATION, LOWER EXTREMITY
with exception of R Knee NT 2/2 in KI (lcked in 20 deg flexion) due to surgical ROM restriction/bilateral LE Active ROM was WFL  (within functional limits)

## 2025-03-31 ENCOUNTER — TRANSCRIPTION ENCOUNTER (OUTPATIENT)
Age: 55
End: 2025-03-31

## 2025-03-31 LAB
ANION GAP SERPL CALC-SCNC: 11 MMOL/L — SIGNIFICANT CHANGE UP (ref 5–17)
BUN SERPL-MCNC: 11 MG/DL — SIGNIFICANT CHANGE UP (ref 7–23)
CALCIUM SERPL-MCNC: 9.2 MG/DL — SIGNIFICANT CHANGE UP (ref 8.4–10.5)
CHLORIDE SERPL-SCNC: 100 MMOL/L — SIGNIFICANT CHANGE UP (ref 96–108)
CO2 SERPL-SCNC: 26 MMOL/L — SIGNIFICANT CHANGE UP (ref 22–31)
CREAT SERPL-MCNC: 0.37 MG/DL — LOW (ref 0.5–1.3)
EGFR: 121 ML/MIN/1.73M2 — SIGNIFICANT CHANGE UP
EGFR: 121 ML/MIN/1.73M2 — SIGNIFICANT CHANGE UP
GLUCOSE SERPL-MCNC: 111 MG/DL — HIGH (ref 70–99)
HCT VFR BLD CALC: 34.8 % — SIGNIFICANT CHANGE UP (ref 34.5–45)
HGB BLD-MCNC: 11.5 G/DL — SIGNIFICANT CHANGE UP (ref 11.5–15.5)
MCHC RBC-ENTMCNC: 30.1 PG — SIGNIFICANT CHANGE UP (ref 27–34)
MCHC RBC-ENTMCNC: 33 G/DL — SIGNIFICANT CHANGE UP (ref 32–36)
MCV RBC AUTO: 91.1 FL — SIGNIFICANT CHANGE UP (ref 80–100)
NRBC BLD AUTO-RTO: 0 /100 WBCS — SIGNIFICANT CHANGE UP (ref 0–0)
PLATELET # BLD AUTO: 314 K/UL — SIGNIFICANT CHANGE UP (ref 150–400)
POTASSIUM SERPL-MCNC: 4 MMOL/L — SIGNIFICANT CHANGE UP (ref 3.5–5.3)
POTASSIUM SERPL-SCNC: 4 MMOL/L — SIGNIFICANT CHANGE UP (ref 3.5–5.3)
RBC # BLD: 3.82 M/UL — SIGNIFICANT CHANGE UP (ref 3.8–5.2)
RBC # FLD: 13.5 % — SIGNIFICANT CHANGE UP (ref 10.3–14.5)
SODIUM SERPL-SCNC: 137 MMOL/L — SIGNIFICANT CHANGE UP (ref 135–145)
WBC # BLD: 7.02 K/UL — SIGNIFICANT CHANGE UP (ref 3.8–10.5)
WBC # FLD AUTO: 7.02 K/UL — SIGNIFICANT CHANGE UP (ref 3.8–10.5)

## 2025-03-31 PROCEDURE — 99232 SBSQ HOSP IP/OBS MODERATE 35: CPT

## 2025-03-31 RX ORDER — POLYETHYLENE GLYCOL 3350 17 G/17G
17 POWDER, FOR SOLUTION ORAL
Refills: 0 | Status: DISCONTINUED | OUTPATIENT
Start: 2025-03-31 | End: 2025-04-03

## 2025-03-31 RX ORDER — BISACODYL 5 MG
10 TABLET, DELAYED RELEASE (ENTERIC COATED) ORAL DAILY
Refills: 0 | Status: DISCONTINUED | OUTPATIENT
Start: 2025-03-31 | End: 2025-04-03

## 2025-03-31 RX ORDER — ENOXAPARIN SODIUM 100 MG/ML
40 INJECTION SUBCUTANEOUS EVERY 24 HOURS
Refills: 0 | Status: DISCONTINUED | OUTPATIENT
Start: 2025-03-31 | End: 2025-03-31

## 2025-03-31 RX ORDER — ENOXAPARIN SODIUM 100 MG/ML
60 INJECTION SUBCUTANEOUS EVERY 12 HOURS
Refills: 0 | Status: DISCONTINUED | OUTPATIENT
Start: 2025-03-31 | End: 2025-04-02

## 2025-03-31 RX ORDER — SIMETHICONE 80 MG
80 TABLET,CHEWABLE ORAL ONCE
Refills: 0 | Status: COMPLETED | OUTPATIENT
Start: 2025-03-31 | End: 2025-03-31

## 2025-03-31 RX ADMIN — POLYETHYLENE GLYCOL 3350 17 GRAM(S): 17 POWDER, FOR SOLUTION ORAL at 22:36

## 2025-03-31 RX ADMIN — Medication 10 MILLIGRAM(S): at 07:06

## 2025-03-31 RX ADMIN — ENOXAPARIN SODIUM 60 MILLIGRAM(S): 100 INJECTION SUBCUTANEOUS at 18:45

## 2025-03-31 RX ADMIN — GABAPENTIN 300 MILLIGRAM(S): 400 CAPSULE ORAL at 05:15

## 2025-03-31 RX ADMIN — Medication 1000 MILLIGRAM(S): at 23:36

## 2025-03-31 RX ADMIN — POLYETHYLENE GLYCOL 3350 17 GRAM(S): 17 POWDER, FOR SOLUTION ORAL at 18:45

## 2025-03-31 RX ADMIN — Medication 80 MILLIGRAM(S): at 07:44

## 2025-03-31 RX ADMIN — Medication 1000 MILLIGRAM(S): at 05:14

## 2025-03-31 RX ADMIN — Medication 2 TABLET(S): at 22:36

## 2025-03-31 RX ADMIN — GABAPENTIN 300 MILLIGRAM(S): 400 CAPSULE ORAL at 22:36

## 2025-03-31 RX ADMIN — ENOXAPARIN SODIUM 60 MILLIGRAM(S): 100 INJECTION SUBCUTANEOUS at 05:15

## 2025-03-31 RX ADMIN — Medication 1000 MILLIGRAM(S): at 22:36

## 2025-03-31 RX ADMIN — Medication 1000 MILLIGRAM(S): at 13:41

## 2025-03-31 RX ADMIN — GABAPENTIN 300 MILLIGRAM(S): 400 CAPSULE ORAL at 13:41

## 2025-03-31 RX ADMIN — Medication 4 MILLIGRAM(S): at 21:55

## 2025-03-31 NOTE — DISCHARGE NOTE PROVIDER - PROVIDER TOKENS
PROVIDER:[TOKEN:[[id number]],FOLLOWUP:[2 weeks]] PROVIDER:[TOKEN:[[id number]],FOLLOWUP:[2 weeks]],PROVIDER:[TOKEN:[[id number]],FOLLOWUP:[Routine]],PROVIDER:[TOKEN:[[id number]],FOLLOWUP:[2 weeks]]

## 2025-03-31 NOTE — DISCHARGE NOTE NURSING/CASE MANAGEMENT/SOCIAL WORK - FINANCIAL ASSISTANCE
[facility] provides services at a reduced cost to those who are determined to be eligible through [facility]’s financial assistance program. Information regarding [facility]’s financial assistance program can be found by going to [email or web address] or by calling [phone].

## 2025-03-31 NOTE — PROGRESS NOTE ADULT - SUBJECTIVE AND OBJECTIVE BOX
SUBJECTIVE: Patient seen on morning rounds with chief resident resting comfortably in bed. Patient has no acute complaints at this time. Pt is tolerating diet and pain is well controlled on current regimen.        MEDICATIONS  (STANDING):  acetaminophen     Tablet .. 1000 milliGRAM(s) Oral every 8 hours  BUpivacaine liposome 1.3% Injectable (no eMAR) 20 milliLiter(s) Local Injection once  enoxaparin Injectable 60 milliGRAM(s) SubCutaneous every 12 hours  gabapentin 300 milliGRAM(s) Oral every 8 hours  lactated ringers. 1000 milliLiter(s) (100 mL/Hr) IV Continuous <Continuous>  polyethylene glycol 3350 17 Gram(s) Oral at bedtime  polyethylene glycol 3350 17 Gram(s) Oral two times a day  senna 2 Tablet(s) Oral at bedtime    MEDICATIONS  (PRN):  bisacodyl Suppository 10 milliGRAM(s) Rectal daily PRN Constipation  HYDROmorphone  Injectable 0.5 milliGRAM(s) IV Push every 15 minutes PRN breakthrough pain in PACU  magnesium hydroxide Suspension 30 milliLiter(s) Oral daily PRN Constipation  ondansetron Injectable 4 milliGRAM(s) IV Push every 6 hours PRN Nausea and/or Vomiting  oxyCODONE    IR 5 milliGRAM(s) Oral every 3 hours PRN Moderate Pain (4 - 6)  oxyCODONE    IR 10 milliGRAM(s) Oral every 3 hours PRN Severe Pain (7 - 10)  simethicone 80 milliGRAM(s) Chew once PRN Dyspepsia      Vital Signs Last 24 Hrs  T(C): 36.9 (31 Mar 2025 05:22), Max: 37 (30 Mar 2025 08:55)  T(F): 98.4 (31 Mar 2025 05:22), Max: 98.6 (30 Mar 2025 08:55)  HR: 69 (31 Mar 2025 05:22) (69 - 93)  BP: 97/62 (31 Mar 2025 05:22) (97/62 - 114/75)  BP(mean): --  RR: 18 (31 Mar 2025 05:22) (16 - 18)  SpO2: 97% (31 Mar 2025 05:22) (94% - 97%)    Parameters below as of 31 Mar 2025 05:22  Patient On (Oxygen Delivery Method): room air        PHYSICAL EXAM:    Constitutional: A&Ox3, NAD, resting comfortably in bed    Respiratory: non labored breathing, no respiratory distress    Extremities: RLE incisions CDI. Wound redressed with xeroform, abd pads, kerlex, ace wrap. Knee immobilizer in place. KAYCE x 1 with ss output. R foot with some edema. Soft, intact sensation, dorsiflexion and plantar flexion intact.                   I&O's Detail    30 Mar 2025 07:01  -  31 Mar 2025 07:00  --------------------------------------------------------  IN:  Total IN: 0 mL    OUT:    Blood Loss (mL): 0 mL    Bulb (mL): 2.5 mL    Voided (mL): 550 mL  Total OUT: 552.5 mL    Total NET: -552.5 mL          LABS:                        11.5   7.02  )-----------( 314      ( 31 Mar 2025 05:30 )             34.8     03-31    137  |  100  |  11  ----------------------------<  111[H]  4.0   |  26  |  0.37[L]    Ca    9.2      31 Mar 2025 05:30        Urinalysis Basic - ( 31 Mar 2025 05:30 )    Color: x / Appearance: x / SG: x / pH: x  Gluc: 111 mg/dL / Ketone: x  / Bili: x / Urobili: x   Blood: x / Protein: x / Nitrite: x   Leuk Esterase: x / RBC: x / WBC x   Sq Epi: x / Non Sq Epi: x / Bacteria: x        RADIOLOGY & ADDITIONAL STUDIES:

## 2025-03-31 NOTE — DISCHARGE NOTE PROVIDER - NSDCCPCAREPLAN_GEN_ALL_CORE_FT
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

## 2025-03-31 NOTE — PROGRESS NOTE ADULT - SUBJECTIVE AND OBJECTIVE BOX
SUBJECTIVE: Pt seen and examined on morning rounds. Pt reports no pain, had an episode of nausea and vomiting x1 overnight, resolved after zofran x1. No BM since OR. Pt denies cp/sob/ha    Vital Signs Last 24 Hrs  T(C): 36.9 (31 Mar 2025 05:22), Max: 37 (30 Mar 2025 08:55)  T(F): 98.4 (31 Mar 2025 05:22), Max: 98.6 (30 Mar 2025 08:55)  HR: 69 (31 Mar 2025 05:22) (69 - 93)  BP: 97/62 (31 Mar 2025 05:22) (97/62 - 114/75)  BP(mean): --  RR: 18 (31 Mar 2025 05:22) (16 - 18)  SpO2: 97% (31 Mar 2025 05:22) (94% - 97%)    Parameters below as of 31 Mar 2025 05:22  Patient On (Oxygen Delivery Method): room air      Physical Exam:  General: NAD, resting comfortably in bed  RLE: Hinged knee brace in place, locked in 20deg flexion.  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   - Stable  - Pain Control  - KAYCE x1 in place - monitor outputs and remove per plastics rec's  - DVT ppx: SCDs, Lovenox 60 q12hr  - Limited doppler RLE negative for DVT on 3/29  - CT chest significant for Small calcified lung nodules and right upper lobe bronchiectasis, likely from prior granulomatous infection on 3/29  - PT rec home pt pending stair navigation  - WBS: toe touch weight bearing RLE  - Dispo: HPT

## 2025-03-31 NOTE — DISCHARGE NOTE PROVIDER - CARE PROVIDERS DIRECT ADDRESSES
,[email or web address]

## 2025-03-31 NOTE — DISCHARGE NOTE PROVIDER - NSDCFUADDINST_GEN_ALL_CORE_FT
ACTIVITY:   - touch Toe Weight bear to the Right lower extremity with assistive device . No strenuous activity, heavy lifting, driving or returning to work until cleared by MD.   - Apply a cold compress to the surgical site several times daily to reduce pain and swelling. For icing, twenty-minute sessions followed by an hour off is recommended. You should ice as frequently as possible. Ice should NEVER be placed directly on the skin. Wearing compression stockings during the first week after surgery can help reduce swelling in your knee, calf and foot, but is not required.      (KNEE REPLACEMENTS)   DO place a pillow under your heel when elevating the leg, to encourage full extension of the knee. Do NOT place a pillow behind your knee for comfort, as this can lead to permanent difficulty straightening your leg. It is normal to develop some swelling in the leg, ankle, and foot because of gravity.     (POSTERIOR HIP REPLACEMENTS)   Hip precautions:   The following precautions will remain in effect for 6 weeks following surgery:   · Do not cross your knees.   · Do not flex your hip (i.e., bring your knee toward your chest) beyond 90 degrees.   · Use a raised toilet seat. Do not use low chairs or couches.     · Sleep with a pillow between your knees.   · Do not reach down to  items on the floor.     (ANTERIOR HIP REPLACEMENTS)   Hip precautions:   · There are no specific range of motion precautions required with this approach to hip replacement.   · A raised toilet seat is not required, although you may find it easier to use one.   · You do not need to sleep with a pillow between your legs.     DRESSING/SHOWERING:   (AQUACEL – brown gel dressing)   - You may shower, your dressing is water-resistant. Do not soak in bathtubs. Remove dressing after postop day 7, then leave incision open to air. Keep your incision clean and dry. Do not pick at your incision. Do not apply creams, ointments or oils to your incision until cleared by your surgeon. Do not soak your incision in sitting water (ie tubs, pools, lakes, etc.) until cleared by your surgeon. Do not scrub the incision – instead, allow soap and water to flow over the incision and then pat it dry with a clean towel.     (PREVENA or CORNELIUS – incisional wound vac)   - You have an incisional wound vac dressing with tubing to attached canister/battery pack. You may shower but must keep battery pack dry at all times. The battery dies in 7 days then can remove dressing and leave open to air. Keep your incision clean and dry. Do not pick at your incision. Do not apply creams, ointments or oils to your incision until cleared by your surgeon. Do not soak your incision in sitting water (ie tubs, pools, lakes, etc.) until cleared by your surgeon. Do not scrub the incision – instead, allow soap and water to flow over the incision and then pat it dry with a clean towel.     MEDICATION/ANTICOAGULATION:   -You have been prescribed ***, as a preventative to help prevent postoperative blood clots. Please take this medication as prescribed.    - You have been prescribed medications for pain:     - Tylenol for mild to moderate pain. Do not exceed 3,000mg daily.     - For more severe pain, take Tylenol with the addition of narcotic pain medication. Take this medication as prescribed. This medication may cause drowsiness or dizziness. Do not operate machinery. This medication may cause constipation.   - For any additional medications, follow instructions on the bottle.    -Try to have regular bowel movements. Take stool softener or laxative if necessary. You may wish to take Miralax daily until you have regular bowel movements.    - If you have been prescribed Aspirin or an anti-inflammatory, please take prilosec (omeprazole) once a day, before breakfast, until no longer taking Aspirin or anti-inflammatory. This will help protect your stomach.   - If you have a pain management physician, please follow-up with them postoperatively.    - If you experience any negative side effects of your medications, please call your surgeon's office to discuss.      FOLLOW-UP:   - Call to schedule an appt with  *** for follow up.   - Please follow-up with your primary care physician or any other specialist you see postoperatively, if needed.    - Contact your doctor or go to the emergency room if you experience: fever greater than 101.5, chills, chest pain, difficulty breathing, redness or excessive drainage around the incision, other concerns.    Plastics  Follow up with Plastics in       Vascular   Followup with Vascular in    ACTIVITY:   - touch Toe Weight bear to the Right lower extremity with assistive device . No strenuous activity, heavy lifting, driving or returning to work until cleared by MD.   - Apply a cold compress to the surgical site several times daily to reduce pain and swelling. For icing, twenty-minute sessions followed by an hour off is recommended. You should ice as frequently as possible. Ice should NEVER be placed directly on the skin. Wearing compression stockings during the first week after surgery can help reduce swelling in your knee, calf and foot, but is not required.        (ANTERIOR HIP REPLACEMENTS)   DRESSING/SHOWERING:   Xeroform, gauze, abdominal pad, wrapped with  ace bandage, shubham in 20deg ext (locked). 1  KAYCE drain managed with plastics   - Do not get the dressing wet. Follow up with Plastics in 1 week to have your dressing changed. Dr. Abe Moyer PGY4  Plastic Surgery  [id number]# [facility] pager  [phone] [facility] pager  [phone] [facility] pager  [facility] Green Team [phone]  Available on Teams         MEDICATION/ANTICOAGULATION:   -You have been prescribed ***, as a preventative to help prevent postoperative blood clots. Please take this medication as prescribed.    - You have been prescribed medications for pain:     - Tylenol for mild to moderate pain. Do not exceed 3,000mg daily.     - For more severe pain, take Tylenol with the addition of narcotic pain medication. Take this medication as prescribed. This medication may cause drowsiness or dizziness. Do not operate machinery. This medication may cause constipation.   - For any additional medications, follow instructions on the bottle.    -Try to have regular bowel movements. Take stool softener or laxative if necessary. You may wish to take Miralax daily until you have regular bowel movements.    - If you have been prescribed Aspirin or an anti-inflammatory, please take prilosec (omeprazole) once a day, before breakfast, until no longer taking Aspirin or anti-inflammatory. This will help protect your stomach.   - If you have a pain management physician, please follow-up with them postoperatively.    - If you experience any negative side effects of your medications, please call your surgeon's office to discuss.      FOLLOW-UP:   - Call to schedule an appt with  *** for follow up.   - Please follow-up with your primary care physician or any other specialist you see postoperatively, if needed.    - Contact your doctor or go to the emergency room if you experience: fever greater than 101.5, chills, chest pain, difficulty breathing, redness or excessive drainage around the incision, other concerns.    Plastics  Follow up with Plastics in       Vascular   Followup with Vascular in    ACTIVITY:   - touch Toe Weight bear to the Right lower extremity with assistive device . No strenuous activity, heavy lifting, driving or returning to work until cleared by MD.   - Apply a cold compress to the surgical site several times daily to reduce pain and swelling. For icing, twenty-minute sessions followed by an hour off is recommended. You should ice as frequently as possible. Ice should NEVER be placed directly on the skin. Wearing compression stockings during the first week after surgery can help reduce swelling in your knee, calf and foot, but is not required.        (ANTERIOR HIP REPLACEMENTS)   DRESSING/SHOWERING:   Xeroform, gauze, abdominal pad, wrapped with  ace bandage, shubham in 20deg ext (locked). 1  KAYCE drain managed with plastics   - Do not get the dressing wet. Follow up with Plastics in 1 week to have your dressing changed. Dr. Abe Moyer PGY4  Plastic Surgery  [id number]# [facility] pager  [phone] [facility] pager  [phone] [facility] pager  [facility] Green Team [phone]  Available on Teams         MEDICATION/ANTICOAGULATION:   -You have been prescribed eliquis, as a preventative to help prevent postoperative blood clots. Please take this medication as prescribed.    - You have been prescribed medications for pain:     - Tylenol for mild to moderate pain. Do not exceed 3,000mg daily.     - For more severe pain, take Tylenol with the addition of narcotic pain medication. Take this medication as prescribed. This medication may cause drowsiness or dizziness. Do not operate machinery. This medication may cause constipation.   - For any additional medications, follow instructions on the bottle.    -Try to have regular bowel movements. Take stool softener or laxative if necessary. You may wish to take Miralax daily until you have regular bowel movements.    - If you have been prescribed Aspirin or an anti-inflammatory, please take prilosec (omeprazole) once a day, before breakfast, until no longer taking Aspirin or anti-inflammatory. This will help protect your stomach.   - If you have a pain management physician, please follow-up with them postoperatively.    - If you experience any negative side effects of your medications, please call your surgeon's office to discuss.      FOLLOW-UP:   - Call to schedule an appt with Dr. Villa for follow up.   - Please follow-up with your primary care physician or any other specialist you see postoperatively, if needed.    - Contact your doctor or go to the emergency room if you experience: fever greater than 101.5, chills, chest pain, difficulty breathing, redness or excessive drainage around the incision, other concerns.    Plastics  Follow up with Plastics in       Vascular   Followup with Vascular in [facility] 3 months from dc for a lower extremity Doppler  You were dcd on eliquis 5mg PO twice a day for 3 months    Plastics-   empty your Kayce drain 3x a day and write down the amount  Followup with Dr. Noble in 1-2 weeks from DC   ACTIVITY:   - Touch Toe Weight bear to the Right lower extremity with assistive device . No strenuous activity, heavy lifting, driving or returning to work until cleared by MD.   - Apply a cold compress to the surgical site several times daily to reduce pain and swelling. For icing, twenty-minute sessions followed by an hour off is recommended. You should ice as frequently as possible. Ice should NEVER be placed directly on the skin. Wearing compression stockings during the first week after surgery can help reduce swelling in your knee, calf and foot, but is not required.        (ANTERIOR HIP REPLACEMENTS)   DRESSING/SHOWERING:   Xeroform, gauze, abdominal pad, wrapped with  ace bandage, shubham in 20deg ext (locked).  - Do not get the dressing wet. Follow up with Plastics in 1 week to have your dressing changed.       MEDICATION/ANTICOAGULATION:   -You have been prescribed eliquis 2.5mg twice a day , as a preventative to help prevent postoperative blood clots. Please take this medication as prescribed.    - You have been prescribed medications for pain:     - Tylenol for mild to moderate pain. Do not exceed 3,000mg daily.     - For more severe pain, take Tylenol with the addition of narcotic pain medication. Take this medication as prescribed. This medication may cause drowsiness or dizziness. Do not operate machinery. This medication may cause constipation.   - For any additional medications, follow instructions on the bottle.    -Try to have regular bowel movements. Take stool softener or laxative if necessary. You may wish to take Miralax daily until you have regular bowel movements.    - If you have been prescribed Aspirin or an anti-inflammatory, please take prilosec (omeprazole) once a day, before breakfast, until no longer taking Aspirin or anti-inflammatory. This will help protect your stomach.   - If you have a pain management physician, please follow-up with them postoperatively.    - If you experience any negative side effects of your medications, please call your surgeon's office to discuss.      FOLLOW-UP:   - Call to schedule an appt with Dr. Villa for follow up.   - Please follow-up with your primary care physician or any other specialist you see postoperatively, if needed.    - Contact your doctor or go to the emergency room if you experience: fever greater than 101.5, chills, chest pain, difficulty breathing, redness or excessive drainage around the incision, other concerns.        Vascular   Followup with Vascular in [facility] 3 months from discharge for a lower extremity Doppler  You were discharged home on eliquis 2.5mg PO twice a day for 3 months    Plastics-   Followup with Dr. Noble in 1-2 weeks from Discharged for wound assessment   ACTIVITY:   - Touch Toe Weight bear to the Right lower extremity with assistive device . No strenuous activity, heavy lifting, driving or returning to work until cleared by MD.   - Apply a cold compress to the surgical site several times daily to reduce pain and swelling. For icing, twenty-minute sessions followed by an hour off is recommended. You should ice as frequently as possible. Ice should NEVER be placed directly on the skin. Wearing compression stockings during the first week after surgery can help reduce swelling in your knee, calf and foot, but is not required.        (ANTERIOR HIP REPLACEMENTS)   DRESSING/SHOWERING:   Xeroform, gauze, abdominal pad, wrapped with  ace bandage, shubham in 20deg ext (locked).  - Do not get the dressing wet. Follow up with Plastics in 1 week to have your dressing changed.       MEDICATION/ANTICOAGULATION:   -You have been prescribed eliquis 2.5mg twice a day , as a preventative to help prevent postoperative blood clots. Please take this medication as prescribed.    - You have been prescribed medications for pain:     - Tylenol for mild to moderate pain. Do not exceed 3,000mg daily.     - For more severe pain, take Tylenol with the addition of narcotic pain medication. Take this medication as prescribed. This medication may cause drowsiness or dizziness. Do not operate machinery. This medication may cause constipation.   - For any additional medications, follow instructions on the bottle.    -Try to have regular bowel movements. Take stool softener or laxative if necessary. You may wish to take Miralax daily until you have regular bowel movements.    - If you have been prescribed Aspirin or an anti-inflammatory, please take prilosec (omeprazole) once a day, before breakfast, until no longer taking Aspirin or anti-inflammatory. This will help protect your stomach.   - If you have a pain management physician, please follow-up with them postoperatively.    - If you experience any negative side effects of your medications, please call your surgeon's office to discuss.      FOLLOW-UP:   - Call to schedule an appt with Dr. Villa for follow up.   - Please follow-up with your primary care physician or any other specialist you see postoperatively, if needed.    - Contact your doctor or go to the emergency room if you experience: fever greater than 101.5, chills, chest pain, difficulty breathing, redness or excessive drainage around the incision, other concerns.        Vascular   Followup with Vascular in [facility] 3 months from discharge for a lower extremity Doppler  You were discharged home on eliquis 2.5mg PO twice a day for 3 months    Plastics-   Followup with Dr. Noble in 1-2 weeks from Discharged for wound assessment.

## 2025-03-31 NOTE — PROGRESS NOTE ADULT - SUBJECTIVE AND OBJECTIVE BOX
Ortho Note    Subjective:  Pt comfortable without complaints, pain controlled with current pain medication regimen   Denies CP, SOB, N/V,   Reviewed plan of care with patient at bedside    Vital Signs Last 24 Hrs  T(C): 36.4 (03-31-25 @ 09:15), Max: 36.4 (03-31-25 @ 09:15)  T(F): 97.5 (03-31-25 @ 09:15), Max: 97.5 (03-31-25 @ 09:15)  HR: 73 (03-31-25 @ 09:15) (73 - 73)  BP: 109/60 (03-31-25 @ 09:15) (109/60 - 109/60)  BP(mean): --  RR: 16 (03-31-25 @ 09:15) (16 - 16)  SpO2: 96% (03-31-25 @ 09:15) (96% - 96%)  AVSS    Objective:    Physical Exam:  General: Pt Alert and oriented, NAD  RLE: Hinged knee brace in place, locked in 20 deg flexion.- Rodrick x1- changed and managed by plastics   Pulses: +2 pedal pulses   Sensation: reports numbness to her Right foot, otherwise silt intact  Motor 4+/5 TA/EHL, 0/5 GS/FHL        Plan of Care:  A/P: 54y Female 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 Villa to review with patient     - appreciate medicine recs  - appreciated vascular recs-     - Plastics- Recommendations:  - Continue neurovascular and compartment checks  - Rest of care per primary team  - Please call [phone] with any questions or concerns    - bowel regimen, IS use, PPI  - Dispo- home pending medical clearance, PT clearance     Ortho Pager [phone] Ortho Note    Subjective:  Pt comfortable without complaints, pain controlled with current pain medication regimen   Denies CP, SOB, N/V,   Reviewed plan of care with patient at bedside    Vital Signs Last 24 Hrs  T(C): 36.4 (03-31-25 @ 09:15), Max: 36.4 (03-31-25 @ 09:15)  T(F): 97.5 (03-31-25 @ 09:15), Max: 97.5 (03-31-25 @ 09:15)  HR: 73 (03-31-25 @ 09:15) (73 - 73)  BP: 109/60 (03-31-25 @ 09:15) (109/60 - 109/60)  BP(mean): --  RR: 16 (03-31-25 @ 09:15) (16 - 16)  SpO2: 96% (03-31-25 @ 09:15) (96% - 96%)  AVSS    Objective:    Physical Exam:  General: Pt Alert and oriented, NAD  RLE: Hinged knee brace in place, locked in 20 deg flexion.- Rodrick x1- changed and managed by plastics   Pulses: +2 pedal pulses   Sensation: reports numbness to her Right foot, otherwise silt intact  Motor 4+/5 TA/EHL, 0/5 GS/FHL        Plan of Care:  A/P: 54y Female 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 Villa to review with patient     - appreciate medicine recs  - appreciated vascular recs-     - Plastics- Recommendations:  - Continue neurovascular and compartment checks  - Rest of care per primary team  - Please call [phone] with any questions or concerns    - bowel regimen, IS use, PPI  - Dispo- home pending medical clearance, PT clearance     Ortho Pager [phone]

## 2025-03-31 NOTE — DISCHARGE NOTE PROVIDER - HOSPITAL COURSE
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 Keep KAYCE per plastics. Foot SCDs RLE to reduce swelling. TTWB RLE per plastics. Keep RLE Elevated.  3/29: vascular rec RLE groin and thigh duplex, CT C- chest ordered for mets screening.  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 Keep KAYCE per plastics. Foot SCDs RLE to reduce swelling. TTWB RLE per plastics. Keep RLE Elevated.  3/29: vascular rec RLE groin and thigh duplex, CT C- chest ordered for mets screening.  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 Keep KAYCE per plastics. Foot SCDs RLE to reduce swelling. TTWB RLE per plastics. Keep RLE Elevated.  3/29: vascular rec RLE groin and thigh duplex, CT C- chest ordered for mets screening.  3/30: ROGELIO  3-31  4-1- wheel chair note started and written       who she follows up with in plastics?: Dr. luna 1-2 weeks from dc    Vascular?  can dc on any oral ac- eliquis 5mg PO BID x3 months   can follow up with Dr. 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 Keep KAYCE per plastics. Foot SCDs RLE to reduce swelling. TTWB RLE per plastics. Keep RLE Elevated.  3/29: vascular rec RLE groin and thigh duplex, CT C- chest ordered for mets screening.  3/30: ROGELIO  3-31  4-1- wheel chair note started and written   4-2- dcd lovenox started on eliquis 5mg PO BID  4-3- cleared pt- wheelchair delivered- plastic dcd drain , switched to eliquis 2.5mg PO BID      who she follows up with in plastics?: Dr. luna 1-2 weeks from dc     can dc on any oral ac- eliquis 2.5mg PO BID x3 months   can follow up with Dr. Boggs in 3 months for duplex-

## 2025-03-31 NOTE — DISCHARGE NOTE PROVIDER - NSDCFUSCHEDAPPT_GEN_ALL_CORE_FT
Jerilyn Noble  [facility]  PLASTICSUR [address]  Scheduled Appointment: 04/24/2025    Tulio Villa  [facility]  ORTHOSURG [address]  Scheduled Appointment: 04/25/2025    Frantz Coulter  [facility]  VASCULAR [address]  Scheduled Appointment: 04/29/2025

## 2025-03-31 NOTE — PROGRESS NOTE ADULT - SUBJECTIVE AND OBJECTIVE BOX
INTERVAL HPI/OVERNIGHT EVENTS: eric o/n    SUBJECTIVE: Patient seen and examined at bedside.   Feels well. Pain is controlled. No Lh/dizziness, chest pain, dyspnea. 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    OBJECTIVE:    VITAL SIGNS:  ICU Vital Signs Last 24 Hrs  T(C): 36.7 (31 Mar 2025 15:25), Max: 36.9 (31 Mar 2025 05:22)  T(F): 98.1 (31 Mar 2025 15:25), Max: 98.4 (31 Mar 2025 05:22)  HR: 85 (31 Mar 2025 15:25) (69 - 93)  BP: 99/66 (31 Mar 2025 15:25) (97/62 - 109/60)  BP(mean): --  ABP: --  ABP(mean): --  RR: 16 (31 Mar 2025 15:25) (16 - 18)  SpO2: 96% (31 Mar 2025 15:25) (96% - 97%)    O2 Parameters below as of 31 Mar 2025 15:25  Patient On (Oxygen Delivery Method): room air              03-30 @ 07:01  -  03-31 @ 07:00  --------------------------------------------------------  IN: 0 mL / OUT: 552.5 mL / NET: -552.5 mL      CAPILLARY BLOOD GLUCOSE          PHYSICAL EXAM:  GEN: female in NAD on RA  HEENT: NC/AT, MMM  CV: RRR, nml S1S2, no murmurs  PULM: nml effort, CTAB  ABD: Soft, non-distended, NABS, non-tender  NEURO  A/O x3, moving all extremities, Sensation intact  RLE in brace - no sensation in R foot. Plantarflex 5/5 b/l. Plantarextension 1/5 on RLE.  PSYCH: Appropriate      MEDICATIONS:  MEDICATIONS  (STANDING):  acetaminophen     Tablet .. 1000 milliGRAM(s) Oral every 8 hours  BUpivacaine liposome 1.3% Injectable (no eMAR) 20 milliLiter(s) Local Injection once  enoxaparin Injectable 60 milliGRAM(s) SubCutaneous every 12 hours  gabapentin 300 milliGRAM(s) Oral every 8 hours  polyethylene glycol 3350 17 Gram(s) Oral at bedtime  polyethylene glycol 3350 17 Gram(s) Oral two times a day  senna 2 Tablet(s) Oral at bedtime    MEDICATIONS  (PRN):  bisacodyl Suppository 10 milliGRAM(s) Rectal daily PRN Constipation  HYDROmorphone  Injectable 0.5 milliGRAM(s) IV Push every 15 minutes PRN breakthrough pain in PACU  magnesium hydroxide Suspension 30 milliLiter(s) Oral daily PRN Constipation  ondansetron Injectable 4 milliGRAM(s) IV Push every 6 hours PRN Nausea and/or Vomiting  oxyCODONE    IR 5 milliGRAM(s) Oral every 3 hours PRN Moderate Pain (4 - 6)  oxyCODONE    IR 10 milliGRAM(s) Oral every 3 hours PRN Severe Pain (7 - 10)      ALLERGIES:  Allergies    No Known Allergies    Intolerances        LABS:                        11.5   7.02  )-----------( 314      ( 31 Mar 2025 05:30 )             34.8     03-31    137  |  100  |  11  ----------------------------<  111[H]  4.0   |  26  |  0.37[L]    Ca    9.2      31 Mar 2025 05:30        Urinalysis Basic - ( 31 Mar 2025 05:30 )    Color: x / Appearance: x / SG: x / pH: x  Gluc: 111 mg/dL / Ketone: x  / Bili: x / Urobili: x   Blood: x / Protein: x / Nitrite: x   Leuk Esterase: x / RBC: x / WBC x   Sq Epi: x / Non Sq Epi: x / Bacteria: x        RADIOLOGY & ADDITIONAL TESTS: Reviewed.

## 2025-03-31 NOTE — DISCHARGE NOTE PROVIDER - CARE PROVIDER_API CALL
Tulio Villa  Musculoskeletal Oncology  [address]  [place] [address]  Phone: [phone]  Fax: [phone]  Follow Up Time: 2 weeks   Tulio Villa  Musculoskeletal Oncology  [address]  [place] [address]  Phone: [phone]  Fax: [phone]  Follow Up Time: 2 weeks    Frantz Coulter  Vascular Surgery  [address]  [place] [address]  Phone: [phone]  Fax: [phone]  Follow Up Time: Routine    Jerilyn Noble  Plastic Surgery  [address] [place] [address]  Phone: [phone]  Fax: [phone]  Follow Up Time: 2 weeks

## 2025-03-31 NOTE — DISCHARGE NOTE NURSING/CASE MANAGEMENT/SOCIAL WORK - PATIENT PORTAL LINK FT
You can access the FollowMyHealth Patient Portal offered by [facility] by registering at the following website: [email or web address]. By joining [facility]’s FollowMyHealth portal, you will also be able to view your health information using other applications (apps) compatible with our system.

## 2025-03-31 NOTE — DISCHARGE NOTE NURSING/CASE MANAGEMENT/SOCIAL WORK - NSDCPEELIQUIS_GEN_ALL_CORE
DISCHARGE SUMMARY FROM [facility] NURSE    The patient is stable for discharge. I have reviewed the discharge instructions with the patient. The patient verbalized understanding. All questions were fully answered. The patient verbalized no complaints. Hard scripts and medication handouts were given and reviewed with the patient. Appropriate educational materials and medication side effects teaching were also provided. Cardiac monitor and IV line(s) were removed. The following personal items collected during admission were returned to the patient/family  Home medications:none  Dental Appliance: Dental Appliances: None  Vision:    Hearing Aid:    Jewelry: Jewelry: None  Clothing: Clothing: None  Other Valuables: Other Valuables: None  Valuables sent to safe: There were no personal belongings, valuables or home medications left at patient's bedside,  or safe. Apixaban/Eliquis - Compliance/Apixaban/Eliquis - Dietary Advice/Apixaban/Eliquis - Follow up monitoring/Apixaban/Eliquis - Potential for adverse drug reactions and interactions

## 2025-03-31 NOTE — DISCHARGE NOTE PROVIDER - NSDCMRMEDTOKEN_GEN_ALL_CORE_FT
acetaminophen 500 mg oral tablet: 2 tab(s) orally every 8 hours  enoxaparin: 60 milligram(s) subcutaneous every 12 hours  gabapentin 300 mg oral capsule: 1 cap(s) orally every 8 hours  magnesium hydroxide 8% oral suspension: 30 milliliter(s) orally once a day As needed Constipation  oxyCODONE 10 mg oral tablet: 1 tab(s) orally every 3 hours As needed Severe Pain (7 - 10)  oxyCODONE 5 mg oral tablet: 1 tab(s) orally every 3 hours As needed Moderate Pain (4 - 6)  senna leaf extract oral tablet: 2 tab(s) orally once a day (at bedtime)  Standard Wheelchair: Standard wheelchair with anti-tippers, brake ext, swing away leg rest, and back/seat cushion&quot;. Length of Need: 99 months.  - height  152.4cm, weight- 60 kg   acetaminophen 500 mg oral tablet: 2 tab(s) orally every 8 hours MDD: 3  apixaban 2.5 mg oral tablet: 1 tab(s) orally 2 times a day MDD: 2  gabapentin 300 mg oral capsule: 1 cap(s) orally every 8 hours MDD: 3  magnesium hydroxide 8% oral suspension: 30 milliliter(s) orally once a day As needed Constipation  oxyCODONE 5 mg oral tablet: 1 tab(s) orally every 4 hours as needed for  severe pain MDD: 6  senna leaf extract oral tablet: 2 tab(s) orally once a day (at bedtime)   acetaminophen 500 mg oral tablet: 2 tab(s) orally every 8 hours MDD: 3  apixaban 2.5 mg oral tablet: 1 tab(s) orally 2 times a day MDD: 2  gabapentin 300 mg oral capsule: 1 cap(s) orally every 8 hours MDD: 3  magnesium hydroxide 8% oral suspension: 30 milliliter(s) orally once a day As needed Constipation  ondansetron 4 mg oral tablet: 1 tab(s) orally every 8 hours as needed for  nausea MDD: 3  oxyCODONE 5 mg oral tablet: 1 tab(s) orally every 4 hours as needed for  severe pain MDD: 6  senna leaf extract oral tablet: 2 tab(s) orally once a day (at bedtime)   acetaminophen 325 mg oral tablet: 2 tab(s) orally every 6 hours As needed Mild Pain (1 - 3)  acetaminophen 500 mg oral tablet: 2 tab(s) orally every 8 hours MDD: 3  apixaban 2.5 mg oral tablet: 1 tab(s) orally 2 times a day MDD: 2  gabapentin 300 mg oral capsule: 1 cap(s) orally every 8 hours MDD: 3  oxyCODONE 5 mg oral tablet: 1 tab(s) orally every 4 hours as needed for Severe Pain (7 - 10) MDD: 6 tab  oxyCODONE 5 mg oral tablet: 1 tab(s) orally every 4 hours as needed for  severe pain MDD: 6  senna leaf extract oral tablet: 2 tab(s) orally once a day (at bedtime)  senna leaf extract oral tablet: 2 tab(s) orally once a day (at bedtime)

## 2025-03-31 NOTE — PROGRESS NOTE ADULT - SUBJECTIVE AND OBJECTIVE BOX
Subjective: Patient seen and examined at bedside. Denies acute complaints this morning. Reports feeling comfortable.    ROS: Denies headache, blurred vision, chest pain, SOB, abdominal pain, nausea or vomiting       Social   acetaminophen     Tablet .. 1000 milliGRAM(s) Oral every 8 hours  bisacodyl Suppository 10 milliGRAM(s) Rectal daily PRN  BUpivacaine liposome 1.3% Injectable (no eMAR) 20 milliLiter(s) Local Injection once  enoxaparin Injectable 60 milliGRAM(s) SubCutaneous every 12 hours  gabapentin 300 milliGRAM(s) Oral every 8 hours  HYDROmorphone  Injectable 0.5 milliGRAM(s) IV Push every 15 minutes PRN  lactated ringers. 1000 milliLiter(s) IV Continuous <Continuous>  magnesium hydroxide Suspension 30 milliLiter(s) Oral daily PRN  ondansetron Injectable 4 milliGRAM(s) IV Push every 6 hours PRN  oxyCODONE    IR 5 milliGRAM(s) Oral every 3 hours PRN  oxyCODONE    IR 10 milliGRAM(s) Oral every 3 hours PRN  polyethylene glycol 3350 17 Gram(s) Oral at bedtime  polyethylene glycol 3350 17 Gram(s) Oral two times a day  senna 2 Tablet(s) Oral at bedtime  acetaminophen     Tablet .. 1000 milliGRAM(s) Oral every 8 hours  bisacodyl Suppository 10 milliGRAM(s) Rectal daily PRN  BUpivacaine liposome 1.3% Injectable (no eMAR) 20 milliLiter(s) Local Injection once  enoxaparin Injectable 60 milliGRAM(s) SubCutaneous every 12 hours  gabapentin 300 milliGRAM(s) Oral every 8 hours  HYDROmorphone  Injectable 0.5 milliGRAM(s) IV Push every 15 minutes PRN  lactated ringers. 1000 milliLiter(s) IV Continuous <Continuous>  magnesium hydroxide Suspension 30 milliLiter(s) Oral daily PRN  ondansetron Injectable 4 milliGRAM(s) IV Push every 6 hours PRN  oxyCODONE    IR 5 milliGRAM(s) Oral every 3 hours PRN  oxyCODONE    IR 10 milliGRAM(s) Oral every 3 hours PRN  polyethylene glycol 3350 17 Gram(s) Oral at bedtime  polyethylene glycol 3350 17 Gram(s) Oral two times a day  senna 2 Tablet(s) Oral at bedtime      Allergies    No Known Allergies    Intolerances        Vital Signs Last 24 Hrs  T(C): 36.9 (31 Mar 2025 05:22), Max: 36.9 (30 Mar 2025 16:05)  T(F): 98.4 (31 Mar 2025 05:22), Max: 98.4 (30 Mar 2025 16:05)  HR: 69 (31 Mar 2025 05:22) (69 - 93)  BP: 97/62 (31 Mar 2025 05:22) (97/62 - 114/75)  BP(mean): --  RR: 18 (31 Mar 2025 05:22) (16 - 18)  SpO2: 97% (31 Mar 2025 05:22) (96% - 97%)    Parameters below as of 31 Mar 2025 05:22  Patient On (Oxygen Delivery Method): room air      I&O's Summary    30 Mar 2025 07:01  -  31 Mar 2025 07:00  --------------------------------------------------------  IN: 0 mL / OUT: 552.5 mL / NET: -552.5 mL          Physical Exam:  General: NAD, resting comfortably in bed  Pulmonary: No respiratory distress, nonlabored breathing, on room air  Cardiovascular: NSR  Abdominal: Soft, NT, ND  Extremities: RLE: knee brace in place, dressing c/d/i, KAYCE drain x1 with serosang output. Compartments soft. 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    Recommendations:  - Continue neurovascular and compartment checks  - Rest of care per primary team  - Please call [phone] with any questions or concerns

## 2025-03-31 NOTE — DISCHARGE NOTE NURSING/CASE MANAGEMENT/SOCIAL WORK - NSDCPEFALRISK_GEN_ALL_CORE
For information on Fall & Injury Prevention, visit: [email or web address] OR  [email or web address] OR  https://www.cdc.gov/steadi/patient.html

## 2025-04-01 LAB
ANION GAP SERPL CALC-SCNC: 8 MMOL/L — SIGNIFICANT CHANGE UP (ref 5–17)
BUN SERPL-MCNC: 13 MG/DL — SIGNIFICANT CHANGE UP (ref 7–23)
CALCIUM SERPL-MCNC: 8.8 MG/DL — SIGNIFICANT CHANGE UP (ref 8.4–10.5)
CHLORIDE SERPL-SCNC: 106 MMOL/L — SIGNIFICANT CHANGE UP (ref 96–108)
CO2 SERPL-SCNC: 25 MMOL/L — SIGNIFICANT CHANGE UP (ref 22–31)
CREAT SERPL-MCNC: 0.39 MG/DL — LOW (ref 0.5–1.3)
EGFR: 120 ML/MIN/1.73M2 — SIGNIFICANT CHANGE UP
EGFR: 120 ML/MIN/1.73M2 — SIGNIFICANT CHANGE UP
GLUCOSE SERPL-MCNC: 101 MG/DL — HIGH (ref 70–99)
HCT VFR BLD CALC: 32.5 % — LOW (ref 34.5–45)
HGB BLD-MCNC: 10.8 G/DL — LOW (ref 11.5–15.5)
MCHC RBC-ENTMCNC: 29.6 PG — SIGNIFICANT CHANGE UP (ref 27–34)
MCHC RBC-ENTMCNC: 33.2 G/DL — SIGNIFICANT CHANGE UP (ref 32–36)
MCV RBC AUTO: 89 FL — SIGNIFICANT CHANGE UP (ref 80–100)
NRBC BLD AUTO-RTO: 0 /100 WBCS — SIGNIFICANT CHANGE UP (ref 0–0)
PLATELET # BLD AUTO: 299 K/UL — SIGNIFICANT CHANGE UP (ref 150–400)
POTASSIUM SERPL-MCNC: 4.2 MMOL/L — SIGNIFICANT CHANGE UP (ref 3.5–5.3)
POTASSIUM SERPL-SCNC: 4.2 MMOL/L — SIGNIFICANT CHANGE UP (ref 3.5–5.3)
RBC # BLD: 3.65 M/UL — LOW (ref 3.8–5.2)
RBC # FLD: 13.8 % — SIGNIFICANT CHANGE UP (ref 10.3–14.5)
SODIUM SERPL-SCNC: 139 MMOL/L — SIGNIFICANT CHANGE UP (ref 135–145)
WBC # BLD: 7.42 K/UL — SIGNIFICANT CHANGE UP (ref 3.8–10.5)
WBC # FLD AUTO: 7.42 K/UL — SIGNIFICANT CHANGE UP (ref 3.8–10.5)

## 2025-04-01 PROCEDURE — 99231 SBSQ HOSP IP/OBS SF/LOW 25: CPT

## 2025-04-01 RX ORDER — ACETAMINOPHEN 500 MG/5ML
2 LIQUID (ML) ORAL
Qty: 0 | Refills: 0 | DISCHARGE
Start: 2025-04-01

## 2025-04-01 RX ORDER — GABAPENTIN 400 MG/1
1 CAPSULE ORAL
Qty: 0 | Refills: 0 | DISCHARGE
Start: 2025-04-01

## 2025-04-01 RX ORDER — ENOXAPARIN SODIUM 100 MG/ML
60 INJECTION SUBCUTANEOUS
Qty: 0 | Refills: 0 | DISCHARGE
Start: 2025-04-01

## 2025-04-01 RX ORDER — SIMETHICONE 80 MG
80 TABLET,CHEWABLE ORAL ONCE
Refills: 0 | Status: COMPLETED | OUTPATIENT
Start: 2025-04-01 | End: 2025-04-01

## 2025-04-01 RX ORDER — MAGNESIUM HYDROXIDE 400 MG/5ML
30 SUSPENSION ORAL
Qty: 0 | Refills: 0 | DISCHARGE
Start: 2025-04-01

## 2025-04-01 RX ORDER — OXYCODONE HYDROCHLORIDE 30 MG/1
1 TABLET ORAL
Qty: 0 | Refills: 0 | DISCHARGE
Start: 2025-04-01

## 2025-04-01 RX ORDER — SENNA 187 MG
2 TABLET ORAL
Qty: 0 | Refills: 0 | DISCHARGE
Start: 2025-04-01

## 2025-04-01 RX ADMIN — Medication 80 MILLIGRAM(S): at 22:22

## 2025-04-01 RX ADMIN — Medication 1000 MILLIGRAM(S): at 05:25

## 2025-04-01 RX ADMIN — Medication 1000 MILLIGRAM(S): at 22:19

## 2025-04-01 RX ADMIN — GABAPENTIN 300 MILLIGRAM(S): 400 CAPSULE ORAL at 14:57

## 2025-04-01 RX ADMIN — Medication 1000 MILLIGRAM(S): at 14:57

## 2025-04-01 RX ADMIN — GABAPENTIN 300 MILLIGRAM(S): 400 CAPSULE ORAL at 05:25

## 2025-04-01 RX ADMIN — GABAPENTIN 300 MILLIGRAM(S): 400 CAPSULE ORAL at 22:19

## 2025-04-01 RX ADMIN — Medication 1000 MILLIGRAM(S): at 06:25

## 2025-04-01 RX ADMIN — ENOXAPARIN SODIUM 60 MILLIGRAM(S): 100 INJECTION SUBCUTANEOUS at 05:25

## 2025-04-01 RX ADMIN — Medication 1000 MILLIGRAM(S): at 23:19

## 2025-04-01 RX ADMIN — ENOXAPARIN SODIUM 60 MILLIGRAM(S): 100 INJECTION SUBCUTANEOUS at 17:49

## 2025-04-01 NOTE — PROGRESS NOTE ADULT - SUBJECTIVE AND OBJECTIVE BOX
Plastic Surgery    SUBJECTIVE: Pt seen and examined on rounds with team. NAEON.      VITALS  T(C): 36.8 (04-01-25 @ 05:24), Max: 36.8 (04-01-25 @ 05:24)  HR: 67 (04-01-25 @ 05:24) (67 - 85)  BP: 92/61 (04-01-25 @ 05:24) (92/61 - 109/60)  RR: 18 (04-01-25 @ 05:24) (16 - 18)  SpO2: 95% (04-01-25 @ 05:24) (95% - 98%)  CAPILLARY BLOOD GLUCOSE          Is/Os    03-30 @ 07:01  -  03-31 @ 07:00  --------------------------------------------------------  IN:  Total IN: 0 mL    OUT:    Blood Loss (mL): 0 mL    Bulb (mL): 2.5 mL    Voided (mL): 550 mL  Total OUT: 552.5 mL    Total NET: -552.5 mL      03-31 @ 07:01  -  04-01 @ 06:32  --------------------------------------------------------  IN:    Oral Fluid: 480 mL  Total IN: 480 mL    OUT:    Blood Loss (mL): 3 mL    Bulb (mL): 2.5 mL    Voided (mL): 1250 mL  Total OUT: 1255.5 mL    Total NET: -775.5 mL        PHYSICAL EXAM:    Constitutional: A&Ox3, NAD, resting comfortably in bed    Respiratory: non labored breathing, no respiratory distress    Extremities: RLE incisions CDI. Wound redressed with xeroform, abd pads, kerlex, ace wrap. Knee immobilizer in place. KAYCE x 1 with ss output. R foot with some edema. Soft, intact sensation, dorsiflexion and plantar flexion intact.       LABS  CBC (03-31 @ 05:30)                              11.5                           7.02    )----------------(  314        --    % Neutrophils, --    % Lymphocytes, ANC: --                                  34.8      BMP (03-31 @ 05:30)             137     |  100     |  11    		Ca++ --      Ca 9.2                ---------------------------------( 111[H]		Mg --                 4.0     |  26      |  0.37[L]			Ph --

## 2025-04-01 NOTE — DIETITIAN INITIAL EVALUATION ADULT - ADD RECOMMEND
1. Continue Regular diet  2. Encourage and monitor PO intake, honor preferences as able   3. Monitor wt trends, GI function, skin integrity  4. Monitor lytes, renal indices, blood glucose, LFTs    5. Pain and bowel regimen per team

## 2025-04-01 NOTE — PROGRESS NOTE ADULT - SUBJECTIVE AND OBJECTIVE BOX
The patient has a mobility limitation that significantly impairs their ability to participate in MRADL’s in the home such as toileting or bathing. The patient’s mobility limitation cannot be resolved by use of a cane or walker and patient’s home has adequate access and space for a wheelchair.  The use of a wheelchair will improve the patient’s ability to participate in MRADL’s and patient will use it on a regular basis in the home.  Patient has a willingness and is not limited by strength or endurance to use a wheelchair and has a hired caregiver in the home who is willing and able to provide assistance.  Without a wheelchair patient will be bedbound.

## 2025-04-01 NOTE — PROGRESS NOTE ADULT - SUBJECTIVE AND OBJECTIVE BOX
INTERVAL HPI/OVERNIGHT EVENTS: eric o/n    SUBJECTIVE: Patient seen and examined at bedside.   Seated in bed - states pain is controlled. Feels sensation is slowly returning to R leg and foot. No LH/dizziness when she mobilizes. Eating wo N/V/Abd pain. +BM. No fever, chest pain, dyspnea.   OBJECTIVE:    VITAL SIGNS:  ICU Vital Signs Last 24 Hrs  T(C): 36.4 (01 Apr 2025 10:13), Max: 36.8 (01 Apr 2025 05:24)  T(F): 97.6 (01 Apr 2025 10:13), Max: 98.3 (01 Apr 2025 05:24)  HR: 66 (01 Apr 2025 10:13) (66 - 78)  BP: 99/61 (01 Apr 2025 10:13) (92/61 - 100/69)  BP(mean): --  ABP: --  ABP(mean): --  RR: 16 (01 Apr 2025 10:13) (16 - 18)  SpO2: 95% (01 Apr 2025 10:13) (95% - 98%)    O2 Parameters below as of 01 Apr 2025 10:13  Patient On (Oxygen Delivery Method): room air              03-31 @ 07:01  -  04-01 @ 07:00  --------------------------------------------------------  IN: 480 mL / OUT: 1255.5 mL / NET: -775.5 mL      CAPILLARY BLOOD GLUCOSE          PHYSICAL EXAM:  GEN: female in NAD on RA  HEENT: NC/AT, MMM  CV: RRR, nml S1S2, no murmurs  PULM: nml effort, CTAB  ABD: Soft, non-distended, NABS, non-tender  NEURO  A/O x3, moving all extremities, Sensation intact  RLE in brace -Plantarflex 5/5 b/l. Plantarextension 1/5 on RLE.  PSYCH: Appropriate      MEDICATIONS:  MEDICATIONS  (STANDING):  acetaminophen     Tablet .. 1000 milliGRAM(s) Oral every 8 hours  BUpivacaine liposome 1.3% Injectable (no eMAR) 20 milliLiter(s) Local Injection once  enoxaparin Injectable 60 milliGRAM(s) SubCutaneous every 12 hours  gabapentin 300 milliGRAM(s) Oral every 8 hours  polyethylene glycol 3350 17 Gram(s) Oral at bedtime  polyethylene glycol 3350 17 Gram(s) Oral two times a day  senna 2 Tablet(s) Oral at bedtime    MEDICATIONS  (PRN):  bisacodyl Suppository 10 milliGRAM(s) Rectal daily PRN Constipation  HYDROmorphone  Injectable 0.5 milliGRAM(s) IV Push every 15 minutes PRN breakthrough pain in PACU  magnesium hydroxide Suspension 30 milliLiter(s) Oral daily PRN Constipation  ondansetron Injectable 4 milliGRAM(s) IV Push every 6 hours PRN Nausea and/or Vomiting  oxyCODONE    IR 5 milliGRAM(s) Oral every 3 hours PRN Moderate Pain (4 - 6)  oxyCODONE    IR 10 milliGRAM(s) Oral every 3 hours PRN Severe Pain (7 - 10)      ALLERGIES:  Allergies    No Known Allergies    Intolerances        LABS:                        10.8   7.42  )-----------( 299      ( 01 Apr 2025 05:30 )             32.5     04-01    139  |  106  |  13  ----------------------------<  101[H]  4.2   |  25  |  0.39[L]    Ca    8.8      01 Apr 2025 05:30        Urinalysis Basic - ( 01 Apr 2025 05:30 )    Color: x / Appearance: x / SG: x / pH: x  Gluc: 101 mg/dL / Ketone: x  / Bili: x / Urobili: x   Blood: x / Protein: x / Nitrite: x   Leuk Esterase: x / RBC: x / WBC x   Sq Epi: x / Non Sq Epi: x / Bacteria: x        RADIOLOGY & ADDITIONAL TESTS: Reviewed.

## 2025-04-01 NOTE — DIETITIAN INITIAL EVALUATION ADULT - OTHER INFO
Patient is a 54y F reporting a "tumor" near posterior lateral right knee, which was removed in November 2024. Pathology showed sarcoma. Denies pain,  numbness, tingling. Ambulates without assist. 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 Received lying in bed, able to articulate nutrition hx. Ordered for Regular diet. Confirms no known food allergies and denies difficulty chewing or swallowing. Reports fair appetite in-house, however notes she has been experiencing "burping" and x2 episodes of emesis s/p OR. Pt is trying to eat bland foods, consumed toast and jam for breakfast this morning. PTA, pt endorses good appetite and intake at baseline. Notes she has recently gained weight and is trying to lose. Observed with no overt signs or symptoms of muscle or fat wasting. Based on ASPEN guidelines, pt meets criteria for protein calorie malnutrition. Labs and medications reviewed. Electrolytes WNL, Cr 0.39<low>, glucose 101-111 x24 hours. Ordered for miralax, dulcolax, magnesium, zofran, pain regimen. Skin: surgical incision R knee, +3 R leg edema noted. GI: +BM 3/31 per flowsheets. See nutrition recommendations below.    Patient is a 54y F reporting a "tumor" near posterior lateral right knee, which was removed in November 2024. Pathology showed sarcoma. Denies pain,  numbness, tingling. Ambulates without assist. 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 Received lying in bed, able to articulate nutrition hx. Ordered for Regular diet. Confirms no known food allergies and denies difficulty chewing or swallowing. Reports fair appetite in-house, however notes she has been experiencing "burping" and x2 episodes of emesis s/p OR. Pt is trying to eat bland foods, consumed toast and jam for breakfast this morning. PTA, pt endorses good appetite and intake at baseline. Pt reports current weight of ~145 pounds and notes she has recently gained weight, would like to lose. Current dosing weight: 132 pounds- RD will monitor weight trends as able. Observed with no overt signs or symptoms of muscle or fat wasting. Based on ASPEN guidelines, pt does not meet criteria for protein calorie malnutrition. Labs and medications reviewed. Electrolytes WNL, Cr 0.39<low>, glucose 101-111 x24 hours. Ordered for miralax, dulcolax, magnesium, zofran, pain regimen. Skin: surgical incision R knee, +3 R leg edema noted. GI: +BM 3/31 per flowsheets. See nutrition recommendations below.

## 2025-04-01 NOTE — DIETITIAN INITIAL EVALUATION ADULT - PERTINENT MEDS FT
MEDICATIONS  (STANDING):  acetaminophen     Tablet .. 1000 milliGRAM(s) Oral every 8 hours  BUpivacaine liposome 1.3% Injectable (no eMAR) 20 milliLiter(s) Local Injection once  enoxaparin Injectable 60 milliGRAM(s) SubCutaneous every 12 hours  gabapentin 300 milliGRAM(s) Oral every 8 hours  polyethylene glycol 3350 17 Gram(s) Oral at bedtime  polyethylene glycol 3350 17 Gram(s) Oral two times a day  senna 2 Tablet(s) Oral at bedtime    MEDICATIONS  (PRN):  bisacodyl Suppository 10 milliGRAM(s) Rectal daily PRN Constipation  HYDROmorphone  Injectable 0.5 milliGRAM(s) IV Push every 15 minutes PRN breakthrough pain in PACU  magnesium hydroxide Suspension 30 milliLiter(s) Oral daily PRN Constipation  ondansetron Injectable 4 milliGRAM(s) IV Push every 6 hours PRN Nausea and/or Vomiting  oxyCODONE    IR 5 milliGRAM(s) Oral every 3 hours PRN Moderate Pain (4 - 6)  oxyCODONE    IR 10 milliGRAM(s) Oral every 3 hours PRN Severe Pain (7 - 10)

## 2025-04-01 NOTE — DIETITIAN INITIAL EVALUATION ADULT - PERSON TAUGHT/METHOD
RD encouraged continued PO intake as tolerated. Discussed importance of adequate kcal/protein intake for post-op healing. Reviewed general healthful diet recommendations including emphasis on lean proteins, fiber,/verbal instruction/patient instructed RD encouraged continued PO intake as tolerated. Discussed importance of adequate kcal/protein intake for post-op healing. Reviewed general healthful diet recommendations including emphasis on lean proteins, fruits, vegetables, whole grains. Pt verbalized appreciation and understanding./verbal instruction/patient instructed

## 2025-04-01 NOTE — PROGRESS NOTE ADULT - SUBJECTIVE AND OBJECTIVE BOX
Ortho Note    Subjective:  Pt comfortable without complaints, pain controlled with current pain medication regimen   Denies CP, SOB, N/V,   Reviewed plan of care with patient at bedside    Vital Signs Last 24 Hrs  T(C): 36.4 (04-01-25 @ 10:13), Max: 36.8 (04-01-25 @ 05:24)  T(F): 97.6 (04-01-25 @ 10:13), Max: 98.3 (04-01-25 @ 05:24)  HR: 66 (04-01-25 @ 10:13) (66 - 67)  BP: 99/61 (04-01-25 @ 10:13) (92/61 - 100/69)  BP(mean): --  RR: 16 (04-01-25 @ 10:13) (16 - 18)  SpO2: 95% (04-01-25 @ 10:13) (95% - 95%)  AVSS    Objective:    Physical Exam:  General: Pt Alert and oriented, NAD  RLE: Hinged knee brace in place, locked in 20 deg flexion.- Rodrick x1- changed and managed by plastics   Pulses: +2 pedal pulses   Sensation: reports numbness to her Right foot, otherwise silt intact  Motor 4+/5 TA/EHL, 0/5 GS/FHL          Plan of Care:  A/P: 54y Female 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 Villa to review findings with patient     - appreciate medicine recs  - appreciated vascular recs-     - Plastics- Recommendations:  - Continue neurovascular and compartment checks  - Rest of care per primary team  - Please call [phone] with any questions or concerns    - bowel regimen, IS use, PPI  - patient recommended for a wheelchair by PT- application started  - patient with right foot numbness- diabetic educator consulted for teaching on how to perform skin checks on the right foot   - Dispo- home pending medical clearance, PT clearance , patient

## 2025-04-01 NOTE — PROGRESS NOTE ADULT - TIME BILLING
Face to face visit. Review of meds, imaging, labs, orders. Coordination of care and documentation
Face to face visit. Review of meds, labs, orders. Coordination of care and documentation
Face to face visit. Review of meds, imaging, labs, orders. Coordination of care and documentation
Face to face visit. Review of meds, imaging, labs, orders. Coordination of care and documentation

## 2025-04-01 NOTE — DIETITIAN INITIAL EVALUATION ADULT - PERTINENT LABORATORY DATA
04-01    139  |  106  |  13  ----------------------------<  101[H]  4.2   |  25  |  0.39[L]    Ca    8.8      01 Apr 2025 05:30

## 2025-04-01 NOTE — PROGRESS NOTE ADULT - SUBJECTIVE AND OBJECTIVE BOX
SUBJECTIVE: Pt seen and examined on morning rounds. Pt reports no pain, some tightness/discomfort behind her knee. Pt denies cp/sob/n/v/ha    Vital Signs Last 24 Hrs  T(C): 36.8 (01 Apr 2025 05:24), Max: 36.8 (01 Apr 2025 05:24)  T(F): 98.3 (01 Apr 2025 05:24), Max: 98.3 (01 Apr 2025 05:24)  HR: 67 (01 Apr 2025 05:24) (67 - 85)  BP: 92/61 (01 Apr 2025 05:24) (92/61 - 109/60)  BP(mean): --  RR: 18 (01 Apr 2025 05:24) (16 - 18)  SpO2: 95% (01 Apr 2025 05:24) (95% - 98%)    Parameters below as of 01 Apr 2025 05:24  Patient On (Oxygen Delivery Method): room air        Physical Exam:  General: NAD, resting comfortably in bed  RLE: Hinged knee brace in place, locked in 20deg flexion, kerlix/ace wrap distal thigh to foot.  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 ABIOLA Villa on 03-27   - Stable  - Pain Control  - KAYCE x1 in place - monitor outputs and remove per plastics rec's  - DVT ppx: SCDs, Lovenox 60 q12hr  - Limited doppler RLE negative for DVT on 3/29  - Appreciate consulting service rec's: medicine, plastic surgery, and vascular surgery  - CT chest significant for Small calcified lung nodules and right upper lobe bronchiectasis, likely from prior granulomatous infection on 3/29  - PT rec home pt pending stair navigation  - WBS: toe touch weight bearing RLE  - Dispo: HPT

## 2025-04-01 NOTE — DIETITIAN INITIAL EVALUATION ADULT - ENTER TO (ML/KG)
Individualized Feeding Plan for Breastfeeding   Lactation Services [phone]      As much as possible, hold your baby on your chest so babys bare skin is against your bare skin with a blanket covering babys back, especially 30 minutes before it is time for baby to eat. Watch for early feeding cues such as, licking lips, sucking motions, rooting, hands to mouth. Crying is a late feeding cue. Feed your baby at least 8 times in 24 hours, or more if your baby is showing feeding cues. If baby is sleepy put baby skin to skin and watch for hunger cues. To rouse baby: unwrap, undress, massage hands, feet, & back, change diaper, gently change babys position from lying to sitting. 15-20 minutes on the first breast of active breastfeeding is considered a good feeding. Good, active breastfeeding is when baby is alert, tugging the nipple, their ear may move, and you can hear swallows. Allow baby to finish the first side before changing sides. Sleeping at the breast or only brief, light sucks should not be considered a good, full breastfeed. At each feeding:  __x__1. Do Suck Practice on finger before each feeding until sucking pattern is smooth. Try using index finger. Nail down towards tongue. __x__2. Hand Express for a few minutes prior to latching to help start milk flow. __x__3. Baby needs to NURSE WELL x 15-20 minutes on at least first breast, burp and offer 2nd breast at every feeding. If no sustained latch only attempt at breast for 10 minutes. If baby does NOT latch on and feed well on at least one side, or if doing pump/bottle you should:   __x__4. Double pump for 15 minutes with breast massage and compression. Hand express for an additional 2-3 minutes per side. Pump after each feeding attempt or poor feeding, up to 8 times per day. If you are not putting baby to the breast you need to pump 8 times a day. Pump every 3 hours. __x__5.  Give baby all of the breast milk you obtain from pumping. Finish each feeding with formula as needed. AVERAGE INTAKES OF COLOSTRUM BY HEALTHY  INFANTS:  Time  Day Intake (ml per feeding)  Based on 8 feedings per day. 1st 24 hrs  1 2-10 ml  24-48 hrs  2 5-15 ml  48-72 hrs  3 15-30 ml (0.5-1 oz) amount per feeding (8 feeds per day)  72-96 hrs  4 30-45 ml (1-1.5oz)                          5-6       45-60 ml (1.5-2oz)                           7          60-75ml (2-2.5oz)    By day 7, baby will need 60-75 ml or 2-2.5 oz at each feeding based on 8 feedings per day & babys weight. (1oz = 30ml). Total milk volume needed in 24 hours by Day 7 is 8-20 oz per day based on baby's birthweight of 7-5. The more often baby eats, the less volume they need per feeding. If baby is eating more often than the minimum of 8 times per day, they may take less per feeding. Comments: If pumping, suggest using olive oil or coconut oil on your nipples before pumping to help reduce the friction. Use feeding plan until follow up with pediatrician. Continue to attempt at the breast for most feeds. Pump every 3 hours if no latch. Give all pumped colostrum/breastmilk at each feeding. Formula supplement at each feeding as needed. OUTPATIENT APPOINTMENT Suggested. Outpatient services are located on the 4th floor at [place]. Check in at the 4th floor registration desk (the same one you used when you came to have your baby).   Call for questions [phone]

## 2025-04-01 NOTE — DIETITIAN INITIAL EVALUATION ADULT - OTHER CALCULATIONS
pounds, %  pounds, %  Pt above % ideal body weight thus ideal body weight used for all calculations. Needs adjusted for age, post-op healing.

## 2025-04-01 NOTE — PROGRESS NOTE ADULT - SUBJECTIVE AND OBJECTIVE BOX
DAILY PROGRESS NOTE    S:  Patient examined bedside, NAC. Patient to be seen by PT/OT today for dispo planning. Patient states leg is tender, but not worsening. Denies chest pain/SOB/fevers/chills.    O:     T(C): 36.4 (04-01-25 @ 10:13), Max: 36.8 (04-01-25 @ 05:24)  HR: 66 (04-01-25 @ 10:13) (66 - 85)  BP: 99/61 (04-01-25 @ 10:13) (92/61 - 100/69)  RR: 16 (04-01-25 @ 10:13) (16 - 18)  SpO2: 95% (04-01-25 @ 10:13) (95% - 98%)    Physical Exam:       I/O (24h)  I:   O:   N:     Labs:       Rads:     A/P:      DAILY PROGRESS NOTE    S:  Patient examined bedside, NAC. Patient to be seen by PT/OT today for dispo planning. Patient states leg is tender, but not worsening. Denies chest pain/SOB/fevers/chills.    O:     T(C): 36.4 (04-01-25 @ 10:13), Max: 36.8 (04-01-25 @ 05:24)  HR: 66 (04-01-25 @ 10:13) (66 - 85)  BP: 99/61 (04-01-25 @ 10:13) (92/61 - 100/69)  RR: 16 (04-01-25 @ 10:13) (16 - 18)  SpO2: 95% (04-01-25 @ 10:13) (95% - 98%)    Physical Exam:   General: NAD, resting comfortably in bed  Pulmonary: No respiratory distress, nonlabored breathing, on room air  Cardiovascular: NSR  Abdominal: Soft, NT, ND  Extremities: RLE: knee brace in place, dressing c/d/i, KAYCE drain x1 with sang output. Compartments soft. b/l feet WWP  Pulses: palpable DP/PT b/l      I/O (24h)    31 Mar 2025 07:01  -  01 Apr 2025 07:00  --------------------------------------------------------  IN:    Oral Fluid: 480 mL  Total IN: 480 mL    OUT:    Blood Loss (mL): 3 mL    Bulb (mL): 2.5 mL    Voided (mL): 1250 mL  Total OUT: 1255.5 mL    Total NET: -775.5 mL        Labs:     LABS:  cret                        10.8   7.42  )-----------( 299      ( 01 Apr 2025 05:30 )             32.5     04-01    139  |  106  |  13  ----------------------------<  101[H]  4.2   |  25  |  0.39[L]    Ca    8.8      01 Apr 2025 05:30

## 2025-04-02 LAB
ANION GAP SERPL CALC-SCNC: 9 MMOL/L — SIGNIFICANT CHANGE UP (ref 5–17)
BUN SERPL-MCNC: 9 MG/DL — SIGNIFICANT CHANGE UP (ref 7–23)
CALCIUM SERPL-MCNC: 8.7 MG/DL — SIGNIFICANT CHANGE UP (ref 8.4–10.5)
CHLORIDE SERPL-SCNC: 106 MMOL/L — SIGNIFICANT CHANGE UP (ref 96–108)
CO2 SERPL-SCNC: 24 MMOL/L — SIGNIFICANT CHANGE UP (ref 22–31)
CREAT SERPL-MCNC: 0.37 MG/DL — LOW (ref 0.5–1.3)
EGFR: 121 ML/MIN/1.73M2 — SIGNIFICANT CHANGE UP
EGFR: 121 ML/MIN/1.73M2 — SIGNIFICANT CHANGE UP
GLUCOSE SERPL-MCNC: 97 MG/DL — SIGNIFICANT CHANGE UP (ref 70–99)
HCT VFR BLD CALC: 29.9 % — LOW (ref 34.5–45)
HGB BLD-MCNC: 9.8 G/DL — LOW (ref 11.5–15.5)
MCHC RBC-ENTMCNC: 29.9 PG — SIGNIFICANT CHANGE UP (ref 27–34)
MCHC RBC-ENTMCNC: 32.8 G/DL — SIGNIFICANT CHANGE UP (ref 32–36)
MCV RBC AUTO: 91.2 FL — SIGNIFICANT CHANGE UP (ref 80–100)
NRBC BLD AUTO-RTO: 0 /100 WBCS — SIGNIFICANT CHANGE UP (ref 0–0)
PLATELET # BLD AUTO: 312 K/UL — SIGNIFICANT CHANGE UP (ref 150–400)
POTASSIUM SERPL-MCNC: 4 MMOL/L — SIGNIFICANT CHANGE UP (ref 3.5–5.3)
POTASSIUM SERPL-SCNC: 4 MMOL/L — SIGNIFICANT CHANGE UP (ref 3.5–5.3)
RBC # BLD: 3.28 M/UL — LOW (ref 3.8–5.2)
RBC # FLD: 13.9 % — SIGNIFICANT CHANGE UP (ref 10.3–14.5)
SODIUM SERPL-SCNC: 139 MMOL/L — SIGNIFICANT CHANGE UP (ref 135–145)
WBC # BLD: 7.9 K/UL — SIGNIFICANT CHANGE UP (ref 3.8–10.5)
WBC # FLD AUTO: 7.9 K/UL — SIGNIFICANT CHANGE UP (ref 3.8–10.5)

## 2025-04-02 PROCEDURE — 99233 SBSQ HOSP IP/OBS HIGH 50: CPT

## 2025-04-02 RX ORDER — SODIUM CHLORIDE 9 G/1000ML
1000 INJECTION, SOLUTION INTRAVENOUS ONCE
Refills: 0 | Status: COMPLETED | OUTPATIENT
Start: 2025-04-02 | End: 2025-04-02

## 2025-04-02 RX ORDER — APIXABAN 2.5 MG/1
5 TABLET, FILM COATED ORAL
Refills: 0 | Status: DISCONTINUED | OUTPATIENT
Start: 2025-04-03 | End: 2025-04-03

## 2025-04-02 RX ORDER — SIMETHICONE 80 MG
80 TABLET,CHEWABLE ORAL
Refills: 0 | Status: DISCONTINUED | OUTPATIENT
Start: 2025-04-02 | End: 2025-04-03

## 2025-04-02 RX ADMIN — OXYCODONE HYDROCHLORIDE 5 MILLIGRAM(S): 30 TABLET ORAL at 08:35

## 2025-04-02 RX ADMIN — POLYETHYLENE GLYCOL 3350 17 GRAM(S): 17 POWDER, FOR SOLUTION ORAL at 21:24

## 2025-04-02 RX ADMIN — Medication 2 TABLET(S): at 21:23

## 2025-04-02 RX ADMIN — GABAPENTIN 300 MILLIGRAM(S): 400 CAPSULE ORAL at 14:58

## 2025-04-02 RX ADMIN — ENOXAPARIN SODIUM 60 MILLIGRAM(S): 100 INJECTION SUBCUTANEOUS at 05:11

## 2025-04-02 RX ADMIN — Medication 80 MILLIGRAM(S): at 14:21

## 2025-04-02 RX ADMIN — Medication 1000 MILLIGRAM(S): at 22:24

## 2025-04-02 RX ADMIN — GABAPENTIN 300 MILLIGRAM(S): 400 CAPSULE ORAL at 21:24

## 2025-04-02 RX ADMIN — GABAPENTIN 300 MILLIGRAM(S): 400 CAPSULE ORAL at 05:11

## 2025-04-02 RX ADMIN — Medication 1000 MILLIGRAM(S): at 06:11

## 2025-04-02 RX ADMIN — Medication 80 MILLIGRAM(S): at 18:06

## 2025-04-02 RX ADMIN — Medication 1000 MILLIGRAM(S): at 21:24

## 2025-04-02 RX ADMIN — SODIUM CHLORIDE 1000 MILLILITER(S): 9 INJECTION, SOLUTION INTRAVENOUS at 22:50

## 2025-04-02 RX ADMIN — Medication 1000 MILLIGRAM(S): at 05:11

## 2025-04-02 RX ADMIN — Medication 1000 MILLIGRAM(S): at 14:58

## 2025-04-02 RX ADMIN — Medication 4 MILLIGRAM(S): at 21:23

## 2025-04-02 RX ADMIN — OXYCODONE HYDROCHLORIDE 10 MILLIGRAM(S): 30 TABLET ORAL at 13:36

## 2025-04-02 RX ADMIN — Medication 4 MILLIGRAM(S): at 14:07

## 2025-04-02 NOTE — PROGRESS NOTE ADULT - SUBJECTIVE AND OBJECTIVE BOX
Patient seen and evaluated this morning. States that excessive bleeding was noted early this AM requiring dressing change. Currently stable, complaining of worsening pain at LE. Otherwise awaiting dispo with PT milestones to meet.     PE  Gen - lying in bed sleeping on arrival  RLE dressing saturated in blood with large clots under dressing, sanguinous oozing from incision, incision otherwise intact    A/P  hold lovenox for now. Follow up with vascular re: long term anticoagulation plan  continue work with PT to arrange appropriate discharge. From my standpoint patient can be toe touch weight bearing with brace  dressing change daily: abd pad, kerlix, ace  will need wound re-checked later prior to discharge.

## 2025-04-02 NOTE — PROGRESS NOTE ADULT - SUBJECTIVE AND OBJECTIVE BOX
RE: Plan of Care    Gene Tran DO    Thank you for referring Mike Maynard. The following information reflects my assessment and plan of care.           Plan of Care 23   Effective from: 2023  Effective to: 2023    Plan ID: [id number]            Participants as of 2023    Name Type Comments Contact Info    Ashwini Scanlon OT Occupational Therapist  [phone]    Milady John MD PCP - General  [phone]           Mike Maynard \"FLORENCIA\" MRN:[id number] (:1953 70 year old M)             Evaluation     Author: Ashwini Scanlon OT Status: Addendum Last edited: 2023  3:30 PM       Occupational Therapy Evaluation    Visit Type: Initial Evaluation  Visit: 1  Referring Provider: Gene Tran DO  Medical Diagnosis (from order): Diagnosis Information    Diagnosis  434.91 (ICD-9-CM) - I63.9 (ICD-10-CM) - Cerebrovascular accident (CVA), unspecified mechanism (CMD)       Treatment Diagnosis: impaired home environment safety, headaches, increased risk for falls, impaired regulation abilities, impaired dual tasking, visual field impairment, impaired safety awareness, impaired community safety  Onset  - Date of onset: 2023  Patient alert and oriented X3.  Chart reviewed at time of initial evaluation (relevant co-morbidities, allergies, tests and medications listed):  Past Medical History:  Smoking hx, Stroke (CMD) (23; possibly 23- no workup done)    Medications:  azithromycin (ZITHROMAX)   promethazine-dextromethorphan (PROMETHAZINE-DM)   amLODIPine (NORVASC)   divalproex (DEPAKOTE ER)   atorvastatin (LIPITOR)   clopidogrel (PLAVIX)   lisinopril-hydroCHLOROthiazide (ZESTORETIC)   insulin glargine (Lantus SoloStar)   aspirin (ECOTRIN)          SUBJECTIVE                                                                                                               Patient verbally consented to allow observer present during session. (Student)    Patient  Feeling okay this morning. Pain being controlled.  Still has drain in place.  Denies chest pain, shortness of breath. Last BM 4/1.      OVERNIGHT EVENTS: NAEO      Remaining ROS negative       PHYSICAL EXAM:    General: NAD, sitting up in bed  HEENT: NC/AT; MMM  Cardiovascular: +S1/S2, RRR  Respiratory: CTA B/L; no W/R/R  Gastrointestinal: soft, NT/ND; +BSx4  Extremities: right lower extremity in brace, drain in place  Psychiatric: pleasant mood and affect      VITAL SIGNS:  Vital Signs Last 24 Hrs  T(C): 36.7 (02 Apr 2025 08:22), Max: 36.8 (02 Apr 2025 05:12)  T(F): 98.1 (02 Apr 2025 08:22), Max: 98.2 (02 Apr 2025 05:12)  HR: 69 (02 Apr 2025 08:22) (65 - 71)  BP: 96/60 (02 Apr 2025 08:22) (96/60 - 106/65)  BP(mean): --  RR: 17 (02 Apr 2025 08:22) (15 - 17)  SpO2: 97% (02 Apr 2025 08:22) (97% - 98%)    Parameters below as of 02 Apr 2025 08:22  Patient On (Oxygen Delivery Method): room air      MEDICATIONS:  MEDICATIONS  (STANDING):  acetaminophen     Tablet .. 1000 milliGRAM(s) Oral every 8 hours  BUpivacaine liposome 1.3% Injectable (no eMAR) 20 milliLiter(s) Local Injection once  gabapentin 300 milliGRAM(s) Oral every 8 hours  polyethylene glycol 3350 17 Gram(s) Oral at bedtime  polyethylene glycol 3350 17 Gram(s) Oral two times a day  senna 2 Tablet(s) Oral at bedtime    MEDICATIONS  (PRN):  bisacodyl Suppository 10 milliGRAM(s) Rectal daily PRN Constipation  HYDROmorphone  Injectable 0.5 milliGRAM(s) IV Push every 15 minutes PRN breakthrough pain in PACU  magnesium hydroxide Suspension 30 milliLiter(s) Oral daily PRN Constipation  ondansetron Injectable 4 milliGRAM(s) IV Push every 6 hours PRN Nausea and/or Vomiting  oxyCODONE    IR 5 milliGRAM(s) Oral every 3 hours PRN Moderate Pain (4 - 6)  oxyCODONE    IR 10 milliGRAM(s) Oral every 3 hours PRN Severe Pain (7 - 10)  simethicone 80 milliGRAM(s) Chew two times a day PRN Dyspepsia      ALLERGIES:  Allergies    No Known Allergies    Intolerances        LABS:                        9.8    7.90  )-----------( 312      ( 02 Apr 2025 07:43 )             29.9     04-02    139  |  106  |  9   ----------------------------<  97  4.0   |  24  |  0.37[L]    Ca    8.7      02 Apr 2025 07:43        Urinalysis Basic - ( 02 Apr 2025 07:43 )    Color: x / Appearance: x / SG: x / pH: x  Gluc: 97 mg/dL / Ketone: x  / Bili: x / Urobili: x   Blood: x / Protein: x / Nitrite: x   Leuk Esterase: x / RBC: x / WBC x   Sq Epi: x / Non Sq Epi: x / Bacteria: x      CAPILLARY BLOOD GLUCOSE          RADIOLOGY & ADDITIONAL TESTS: Reviewed. presents to initial OT eval due to recent CVA (4/25/23). Patient came with daughter and grandson. He recently was admitted to [facility] on 4/29/23 and discharged 5/523. A recent neuropsychological exam  (6/9/23), led to recent dx of frontotemporal dementia (FTD). Patient/family's  current concerns are behavioral changes, difficulty with memory and lack of ability to participate in any IADLs spending the majority of the day sleeping or watching TV.    His daughter mentions frequent aggressive behaviors and difficulty using the phone. Reports that at night patient will wander and that he cannot go to places alone. Patient will get lost even in the home.             Pain / Symptoms  - Patient denies pain / symptoms.    Patient Goals: independence with ADLs/IADLs, reduce caregiver burden, improve community safety/access, prevent falls/reduce fall risk and return to previous activities.    Prior treatment  - no therapies Was recently psychiatrically admitted (4/29/23- 5/5/23)  - Discharged from hospital, home health, or skilled nursing facility in last 30 days: yes  Home Environment   - Patient lives with: significant other, caregiver of infirmed family member and children  - Type of home: multiple level home (2 steps to enter, stays on first floor)  - Bathroom setup: tub with shower  - Assistance available: consistent  - Reported 2 or more falls or an unexplained fall with injury in the last year.  - Feel safe at home / work / school: yes    Personal Occupations Profile Affected: functional mobility/transfers, communication managment, community mobility, financial managment, home establishment/managements, meal preparation/cleanup, driving, health management/maintenance, sleep participation, shopping, medication managment, social participation family     Function (patient/family/caregiver reported):   Bathing:        • washing upper body assist current: independent (Was frequently losing where clothes are)       •  washing lower body assist current: independent  Dressing:        • pull over shirt assist current: independent       • pull around shirt assist current: independent       • don/doff pants assist current: independent       • don/doff sock/compressin socks assist current: independent       • don/doff and tie shoes assist current: independent  Current: Morning routine: forgets sequence at times.   Item Retrieval: Forgets and can't find where things are   Home management: Dependent  Meal Preparation: Dependent  Medication Management: Dependent with use of locked medication box  Job Performance: was a , unable to work   Driving: Dependent, not driving due to vision/cognition  Phone/Computer Use: independent with flip phone but forgets to take with him      Prior Level: Prior to stroke was independent with lawn and household repairs     OBJECTIVE                                                                                                                    Cognitive Status   Orientation    - Oriented to: person, situation and place  Functional Communication   - Overall Status: impaired  Attention Span    - Attention: impaired   - Attention impairment: reduced memory  Following Direction   - follows one step commands consistently  Executive Function    - Organization: impaired   - Problem Solving: impaired  Memory   - - decreased recall of recent events - decreased short term memory impaired    Vitals:  Blood Pressure (mmhg):      - Seated:Seated BP symptoms: BP did drop during session, orange juice was provided.      Range of Motion (ROM)   (degrees unless noted; active unless noted; norms in ( ); negative=lacking to 0, positive=beyond 0)  WFL: LUE, RUE    Strength  (out of 5 unless noted, standard test position unless noted)   WFL: LUE, RUE          Coordination  LUE:      - Finger-Nose: intact  RUE:      - Finger-Nose: intact          Vision  Current Vision: wears glasses only for reading and limited field  of vision (Used readers here: +2.5)  Visual Fields   - Left: homonymous hemianopsia; Right: homonymous hemianopsia  Dynavision  Mode A: 36   Right upper: 1.22  Right Lower: 1.31  Left upper: 1.92  Left lower: 1.82      Outcome/Assessments  Patient Specific Functional Scale:   Activity: Be able to locate my clothing in bedroom prior to dressing, Score: 3  Activity: Follow a routine with written supports as needed, Score: 2  Activity: Make a beverage and snack without assistance, Score: 3  Average Score: 2.67  Each activity is scored: 0=unable to perform activity to 10=able to perform activity at the same level as before injury or problem      Treatment     Therapeutic Activity  CPT Shop  Score: 5/6 general cue      Activities of Daily Living/Self Care  Needs Assessment:  Behavior/Mental state needs: Wandering, Verbal aggression, Physical aggression, Sexual disinhibition, Anxiety, Sleep disturbance, Mood swings and Hallucinations  Thinking/Memory needs: Recognizing strangers, Remembering routines and Repetitive questions  Self care/toileting: None    Dementia Severity Rating Scale:  Person completing form: Daughter (SE)  Live with participant? Yes  Time spent with the participant: 5 or > days per week  Relationship to participant: child    DSRS Score: 20  0-18 --- Mild  19-36 -- Moderate  37-54 -- Severe   Comment: Daughter stated that he does choke a lot on his food, resulting in vomiting and then refusing to eat      Skilled input: verbal instruction/cues    Writer verbally educated and received verbal consent for hand placement, positioning of patient, and techniques to be performed today from patient for therapist position for techniques as described above and how they are pertinent to the patient's plan of care.      ASSESSMENT                                                                                                          70 year old patient that has reported functional limitations listed  above.  Treatment Diagnosis: impaired home environment safety, headaches, increased risk for falls, impaired regulation abilities, impaired dual tasking, visual field impairment, impaired safety awareness, impaired community safety    Patient presents with a history of a stroke resulting in memory deficits impacting IADLs such as home management tasks and following routines, as well as social participation with his family. Recent stroke has increased caregiver support needed at home specifically with locating clothing items. The needs assessment and DSRS revealed that patient's FTD has impacted the patients routines significantly and that he would benefit from skilled OT intervention to address the memory and visual deficits. A possible home visit may be needed to determine if any modifcations can be made to the environment to support memory and visual deficity.     Prognosis: Patient will benefit from skilled therapy. good.  Clinical decision making: Moderate - Patient has several limitations (3-5), comorbidities and/or complexities, as noted in detailed assessment above, that impact their occupational profile.  Resulting in several treatment options and minimal to moderate task modification consistent with moderate clinical decision making complexity.  Education:   - Present and ready to learn: patient    PLAN                                                                                                                         The following skilled interventions to be implemented to achieve goals listed below:  Neuromuscular Re-Education (96103)  Therapeutic Activity (34916)  Therapeutic Exercise (40754)  Manual Therapy (87574)  Activities of Daily Living/Self Care (88738)    Frequency / Duration  1 times per week tapering as patient progresses for 12 weeks for an estimated total of 8 visits    Patient involved in and agreed to plan of care and goals.  Patient given attendance policy at time of initial  evaluation.    Suggestions for next session as indicated: Progress per plan of care, consider speech referral, finish eval (oculomotor exam and acuity, CPT (wash, phone, med, toast subtests, and SLUMS,      Establish AM/PM routine for memory support,  Address needs assessment (sensory approach to managing aggression, sleep/wake schedules), consider GPS if pt taking walks alone, assess ability to use personal phone. Progressive visual scanning including dyanvision and lighthouse strategy, Robnets, Item retrieval      Goals  Long Term Goals: to be met by end of plan of care   1. Patient's PSFS will increase by total of 4 points, indicating a clinically significant change in self-reported ability to perform functional activities at same level prior to injury or problem  2. Patient will verbalize at least 1-2 strategies for each problem area located within the needs assessment  3. Patient will demonstrate ability to make a beverage and cold snack without assistance locating supplies   4. Patient will identify 1-2 purposeful activities to complete when at home to add to daily routine    I certify that I have directed and/or performed this entire treatment session.  Therapy procedure time and total treatment time can be found documented on the Time Entry flowsheet         Current Participants as of 6/13/2023    Name Type Comments Contact Info    Ashwini Scanlon OT Occupational Therapist  [phone]    Signature pending    Milady John MD PCP - General  [phone]    Signature pending            Please complete the attached form to indicate your approval of the plan of care upon receipt and fax signed form to the fax number below.  Insurance compliance requires your approval be on file.  Should you have any questions, feel free to contact me.     Ashwini Scanlon OT  [facility], 3rd Floor  [address] [address]  Phone: [phone]  Fax: [phone]                RE:  Plan of Care for Mike Maynard, YOB: 1953     I certify the need for these services, furnished under this plan of treatment and while under my care.  I agree with the plan of care as stated and request that therapy proceed.        __________________________________________________________________________________  Provider Signature         Date   Time

## 2025-04-02 NOTE — PROGRESS NOTE ADULT - SUBJECTIVE AND OBJECTIVE BOX
SUBJECTIVE: Pt seen and examined on morning rounds. Pt reports oozing through dressing at the posterior knee beginning yesterday at 7pm. Resident on call reinforced the dressing overnight and the plastic surgery team re-evaluated the wound this morning/completed the dressing change. Pt denies cp/sob/n/v/ha    Vital Signs Last 24 Hrs  T(C): 36.8 (02 Apr 2025 05:12), Max: 36.8 (02 Apr 2025 05:12)  T(F): 98.2 (02 Apr 2025 05:12), Max: 98.2 (02 Apr 2025 05:12)  HR: 65 (02 Apr 2025 05:12) (65 - 71)  BP: 100/63 (02 Apr 2025 05:12) (99/61 - 106/65)  BP(mean): --  RR: 15 (02 Apr 2025 05:12) (15 - 17)  SpO2: 97% (02 Apr 2025 05:12) (95% - 98%)    Parameters below as of 02 Apr 2025 05:12  Patient On (Oxygen Delivery Method): room air        Physical Exam:  General: NAD, resting comfortably in bed  RLE: Hinged knee brace in place, locked in 20deg flexion, kerlix/ace wrap distal thigh to foot.  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   - Stable  - Pain Control  - holding am dose lovenox per plastics recommendation  - f/u pm dressing change by plastic surgery team  - f/u long term AC plan with Vascular surgery team  - KAYCE x1 in place - monitor outputs  - DVT ppx: SCDs, Lovenox 60 q12hr (holding am dose today)  - Limited doppler RLE negative for DVT on 3/29  - Appreciate consulting service rec's: medicine, plastic surgery, and vascular surgery  - CT chest significant for Small calcified lung nodules and right upper lobe bronchiectasis, likely from prior granulomatous infection (remote TB hx) on 3/29  - PT rec home pt pending stair navigation  - WBS: toe touch weight bearing RLE  - Dispo: HPT

## 2025-04-03 ENCOUNTER — APPOINTMENT (OUTPATIENT)
Dept: PLASTIC SURGERY | Facility: CLINIC | Age: 55
End: 2025-04-03

## 2025-04-03 VITALS
OXYGEN SATURATION: 97 % | SYSTOLIC BLOOD PRESSURE: 95 MMHG | TEMPERATURE: 98 F | RESPIRATION RATE: 17 BRPM | DIASTOLIC BLOOD PRESSURE: 59 MMHG | HEART RATE: 67 BPM

## 2025-04-03 LAB
ANION GAP SERPL CALC-SCNC: 10 MMOL/L — SIGNIFICANT CHANGE UP (ref 5–17)
BUN SERPL-MCNC: 7 MG/DL — SIGNIFICANT CHANGE UP (ref 7–23)
CALCIUM SERPL-MCNC: 8.5 MG/DL — SIGNIFICANT CHANGE UP (ref 8.4–10.5)
CHLORIDE SERPL-SCNC: 103 MMOL/L — SIGNIFICANT CHANGE UP (ref 96–108)
CO2 SERPL-SCNC: 26 MMOL/L — SIGNIFICANT CHANGE UP (ref 22–31)
CREAT SERPL-MCNC: 0.39 MG/DL — LOW (ref 0.5–1.3)
EGFR: 120 ML/MIN/1.73M2 — SIGNIFICANT CHANGE UP
EGFR: 120 ML/MIN/1.73M2 — SIGNIFICANT CHANGE UP
GLUCOSE SERPL-MCNC: 96 MG/DL — SIGNIFICANT CHANGE UP (ref 70–99)
HCT VFR BLD CALC: 27.4 % — LOW (ref 34.5–45)
HGB BLD-MCNC: 9.2 G/DL — LOW (ref 11.5–15.5)
MCHC RBC-ENTMCNC: 30.8 PG — SIGNIFICANT CHANGE UP (ref 27–34)
MCHC RBC-ENTMCNC: 33.6 G/DL — SIGNIFICANT CHANGE UP (ref 32–36)
MCV RBC AUTO: 91.6 FL — SIGNIFICANT CHANGE UP (ref 80–100)
NRBC BLD AUTO-RTO: 0 /100 WBCS — SIGNIFICANT CHANGE UP (ref 0–0)
PLATELET # BLD AUTO: 278 K/UL — SIGNIFICANT CHANGE UP (ref 150–400)
POTASSIUM SERPL-MCNC: 3.8 MMOL/L — SIGNIFICANT CHANGE UP (ref 3.5–5.3)
POTASSIUM SERPL-SCNC: 3.8 MMOL/L — SIGNIFICANT CHANGE UP (ref 3.5–5.3)
RBC # BLD: 2.99 M/UL — LOW (ref 3.8–5.2)
RBC # FLD: 14.2 % — SIGNIFICANT CHANGE UP (ref 10.3–14.5)
SODIUM SERPL-SCNC: 139 MMOL/L — SIGNIFICANT CHANGE UP (ref 135–145)
WBC # BLD: 7.71 K/UL — SIGNIFICANT CHANGE UP (ref 3.8–10.5)
WBC # FLD AUTO: 7.71 K/UL — SIGNIFICANT CHANGE UP (ref 3.8–10.5)

## 2025-04-03 PROCEDURE — 84132 ASSAY OF SERUM POTASSIUM: CPT

## 2025-04-03 PROCEDURE — C1889: CPT

## 2025-04-03 PROCEDURE — 97162 PT EVAL MOD COMPLEX 30 MIN: CPT

## 2025-04-03 PROCEDURE — 97165 OT EVAL LOW COMPLEX 30 MIN: CPT

## 2025-04-03 PROCEDURE — 82805 BLOOD GASES W/O2 SATURATION: CPT

## 2025-04-03 PROCEDURE — 36415 COLL VENOUS BLD VENIPUNCTURE: CPT

## 2025-04-03 PROCEDURE — C1763: CPT

## 2025-04-03 PROCEDURE — 82330 ASSAY OF CALCIUM: CPT

## 2025-04-03 PROCEDURE — 80048 BASIC METABOLIC PNL TOTAL CA: CPT

## 2025-04-03 PROCEDURE — 97535 SELF CARE MNGMENT TRAINING: CPT

## 2025-04-03 PROCEDURE — 97116 GAIT TRAINING THERAPY: CPT

## 2025-04-03 PROCEDURE — 97530 THERAPEUTIC ACTIVITIES: CPT

## 2025-04-03 PROCEDURE — 97110 THERAPEUTIC EXERCISES: CPT

## 2025-04-03 PROCEDURE — 88309 TISSUE EXAM BY PATHOLOGIST: CPT

## 2025-04-03 PROCEDURE — 84295 ASSAY OF SERUM SODIUM: CPT

## 2025-04-03 PROCEDURE — 93971 EXTREMITY STUDY: CPT

## 2025-04-03 PROCEDURE — 82947 ASSAY GLUCOSE BLOOD QUANT: CPT

## 2025-04-03 PROCEDURE — 86900 BLOOD TYPING SEROLOGIC ABO: CPT

## 2025-04-03 PROCEDURE — 86901 BLOOD TYPING SEROLOGIC RH(D): CPT

## 2025-04-03 PROCEDURE — 71250 CT THORAX DX C-: CPT | Mod: MC

## 2025-04-03 PROCEDURE — 86850 RBC ANTIBODY SCREEN: CPT

## 2025-04-03 PROCEDURE — 85027 COMPLETE CBC AUTOMATED: CPT

## 2025-04-03 RX ORDER — APIXABAN 2.5 MG/1
1 TABLET, FILM COATED ORAL
Qty: 180 | Refills: 0
Start: 2025-04-03 | End: 2025-07-01

## 2025-04-03 RX ORDER — GABAPENTIN 400 MG/1
1 CAPSULE ORAL
Qty: 21 | Refills: 0
Start: 2025-04-03 | End: 2025-04-09

## 2025-04-03 RX ORDER — ACETAMINOPHEN 500 MG/5ML
2 LIQUID (ML) ORAL
Qty: 42 | Refills: 0
Start: 2025-04-03 | End: 2025-04-09

## 2025-04-03 RX ORDER — OXYCODONE HYDROCHLORIDE 30 MG/1
1 TABLET ORAL
Qty: 42 | Refills: 0
Start: 2025-04-03 | End: 2025-04-09

## 2025-04-03 RX ORDER — ONDANSETRON HCL/PF 4 MG/2 ML
1 VIAL (ML) INJECTION
Qty: 12 | Refills: 0
Start: 2025-04-03 | End: 2025-04-06

## 2025-04-03 RX ADMIN — Medication 1000 MILLIGRAM(S): at 13:00

## 2025-04-03 RX ADMIN — POLYETHYLENE GLYCOL 3350 17 GRAM(S): 17 POWDER, FOR SOLUTION ORAL at 17:27

## 2025-04-03 RX ADMIN — Medication 4 MILLIGRAM(S): at 05:25

## 2025-04-03 RX ADMIN — POLYETHYLENE GLYCOL 3350 17 GRAM(S): 17 POWDER, FOR SOLUTION ORAL at 05:16

## 2025-04-03 RX ADMIN — OXYCODONE HYDROCHLORIDE 5 MILLIGRAM(S): 30 TABLET ORAL at 07:12

## 2025-04-03 RX ADMIN — OXYCODONE HYDROCHLORIDE 5 MILLIGRAM(S): 30 TABLET ORAL at 18:24

## 2025-04-03 RX ADMIN — OXYCODONE HYDROCHLORIDE 5 MILLIGRAM(S): 30 TABLET ORAL at 15:07

## 2025-04-03 RX ADMIN — Medication 80 MILLIGRAM(S): at 05:25

## 2025-04-03 RX ADMIN — Medication 1000 MILLIGRAM(S): at 06:16

## 2025-04-03 RX ADMIN — OXYCODONE HYDROCHLORIDE 5 MILLIGRAM(S): 30 TABLET ORAL at 08:12

## 2025-04-03 RX ADMIN — GABAPENTIN 300 MILLIGRAM(S): 400 CAPSULE ORAL at 05:16

## 2025-04-03 RX ADMIN — OXYCODONE HYDROCHLORIDE 5 MILLIGRAM(S): 30 TABLET ORAL at 14:07

## 2025-04-03 RX ADMIN — Medication 80 MILLIGRAM(S): at 14:07

## 2025-04-03 RX ADMIN — APIXABAN 5 MILLIGRAM(S): 2.5 TABLET, FILM COATED ORAL at 17:26

## 2025-04-03 RX ADMIN — OXYCODONE HYDROCHLORIDE 5 MILLIGRAM(S): 30 TABLET ORAL at 19:24

## 2025-04-03 RX ADMIN — GABAPENTIN 300 MILLIGRAM(S): 400 CAPSULE ORAL at 13:00

## 2025-04-03 RX ADMIN — Medication 1000 MILLIGRAM(S): at 05:16

## 2025-04-03 NOTE — PROGRESS NOTE ADULT - PROVIDER SPECIALTY LIST ADULT
Hospitalist
Orthopedics
Plastic Surgery
Vascular Surgery
Vascular Surgery
Orthopedics
Plastic Surgery
Vascular Surgery
Hospitalist
Orthopedics
Plastic Surgery
Plastic Surgery
Vascular Surgery
Hospitalist
Plastic Surgery
Hospitalist
Hospitalist

## 2025-04-03 NOTE — PROGRESS NOTE ADULT - REASON FOR ADMISSION
right knee sarcoma resection

## 2025-04-03 NOTE — PROGRESS NOTE ADULT - SUBJECTIVE AND OBJECTIVE BOX
Ortho Note    Subjective:  Pt comfortable without complaints, pain controlled with current pain medication regimen   Denies CP, SOB, N/V,   Reviewed plan of care with patient at bedside    Vital Signs Last 24 Hrs  T(C): 36.6 (04-03-25 @ 08:10), Max: 36.6 (04-03-25 @ 05:29)  T(F): 97.8 (04-03-25 @ 08:10), Max: 97.9 (04-03-25 @ 05:29)  HR: 67 (04-03-25 @ 08:10) (61 - 67)  BP: 95/59 (04-03-25 @ 08:10) (95/59 - 99/63)  BP(mean): --  RR: 17 (04-03-25 @ 08:10) (15 - 17)  SpO2: 97% (04-03-25 @ 08:10) (97% - 98%)  AVSS    Objective:      Physical Exam:  General: Pt Alert and oriented, NAD  RLE: Hinged knee brace in place, locked in 20 deg flexion.- Rodrick DC by [employer]-   Pulses: +2 pedal pulses   Sensation: reports numbness to her Right foot base, otherwise silt intact  Motor 4+/5 TA/EHL, 0/5 GS/FHL      Plan of Care:  A/P: 54y Female s/p R popliteal sarcoma bed resection w/ Tibial nerve resection and graft 3/27  - afebrile, wbcs 7.71  - Pain Control- gabapentin 300mg PO Q8h, tylenol 1000mg PO Q8h, Oxcycodone 5-10mg PO Q3h prn moderate to severe pain,   - DVT ppx: switched to eliquis 5mg PO BID   - PT, WBS: TTWB RLE in KI in 20 deg flexion in [place]   - bowel regimen, Is use, PPI   -- CT chest significant for Small calcified lung nodules and right upper lobe bronchiectasis, likely from prior granulomatous infection on 3/29 - Dr. Villa to review findings with patient and f/u outpatient    - appreciate medicine recs  - appreciated vascular recs-   Recommendations:  - Continue neurovascular and compartment checks  - C/W AC  - Rest of care per primary team  - Please call [phone] with any questions or concerns      - appreciate plastics recs-   - continue slight knee flexion of RLE, light toe touch to partial weight bearing okay  - maintain sutures  - drain removed today  - cont apixaban per vascular surgery  - follow up with Dr. Noble  - williams for dc after Dr. Noble sees patient this afternoon   - bowel regimen, Is use, PPI     - Dispo- home pending pt clearance, wheelchair at bedside, medical clearance        Ortho Note    Subjective:  Pt comfortable without complaints, pain controlled with current pain medication regimen   Denies CP, SOB, N/V,   Reviewed plan of care with patient at bedside    Vital Signs Last 24 Hrs  T(C): 36.6 (04-03-25 @ 08:10), Max: 36.6 (04-03-25 @ 05:29)  T(F): 97.8 (04-03-25 @ 08:10), Max: 97.9 (04-03-25 @ 05:29)  HR: 67 (04-03-25 @ 08:10) (61 - 67)  BP: 95/59 (04-03-25 @ 08:10) (95/59 - 99/63)  BP(mean): --  RR: 17 (04-03-25 @ 08:10) (15 - 17)  SpO2: 97% (04-03-25 @ 08:10) (97% - 98%)  AVSS    Objective:      Physical Exam:  General: Pt Alert and oriented, NAD  RLE: Hinged knee brace in place, locked in 20 deg flexion.- Rodrick DC by plastics-   Pulses: +2 pedal pulses   Sensation: reports numbness to her Right foot base, otherwise silt intact  Motor 4+/5 TA/EHL, 0/5 GS/FHL      Plan of Care:  A/P: 54y Female s/p R popliteal sarcoma bed resection w/ Tibial nerve resection and graft 3/27  - afebrile, wbcs 7.71  - Pain Control- gabapentin 300mg PO Q8h, tylenol 1000mg PO Q8h, Oxcycodone 5-10mg PO Q3h prn moderate to severe pain,   - DVT ppx: switched to eliquis 5mg PO BID   - PT, WBS: TTWB RLE in KI in 20 deg flexion in [place]   - bowel regimen, Is use, PPI   -- CT chest significant for Small calcified lung nodules and right upper lobe bronchiectasis, likely from prior granulomatous infection on 3/29 - Dr. Villa to review findings with patient and f/u outpatient    - appreciate medicine recs  - appreciated vascular recs-   Recommendations:  - Continue neurovascular and compartment checks  - C/W AC- recommended eliquis 2.5mg PO BID  - Rest of care per primary team  - Please call [phone] with any questions or concerns      - appreciate plastics recs-   - continue slight knee flexion of RLE, light toe touch to partial weight bearing okay  - maintain sutures  - drain removed today  - cont apixaban per vascular surgery  - follow up with Dr. Noble  - williams for dc after Dr. Noble sees patient this afternoon   - bowel regimen, Is use, PPI     - Dispo- home pending pt clearance, wheelchair at bedside, medical clearance

## 2025-04-03 NOTE — PROGRESS NOTE ADULT - SUBJECTIVE AND OBJECTIVE BOX
SUBJECTIVE: Pt seen and examined on morning rounds. Pt had continued serosanguinous drainage from R knee overnight through posterior dressing. Pt denies cp/sob/n/v/ha    Vital Signs Last 24 Hrs  T(C): 36.6 (03 Apr 2025 05:29), Max: 36.7 (02 Apr 2025 08:22)  T(F): 97.9 (03 Apr 2025 05:29), Max: 98.1 (02 Apr 2025 08:22)  HR: 61 (03 Apr 2025 07:10) (61 - 76)  BP: 99/63 (03 Apr 2025 07:10) (90/50 - 102/65)  BP(mean): --  RR: 15 (03 Apr 2025 05:29) (15 - 17)  SpO2: 98% (03 Apr 2025 05:29) (96% - 98%)    Parameters below as of 03 Apr 2025 05:29  Patient On (Oxygen Delivery Method): room air        Physical Exam:  General: NAD, resting comfortably in bed  RLE: Hinged knee brace in place, locked in 20deg flexion, kerlix/ace wrap distal thigh to foot, mild staining of ace wrap at posterior knee  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

## 2025-04-03 NOTE — PROGRESS NOTE ADULT - ASSESSMENT
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 PPx: SQL. On bowel regimen and incentive spirometer  #R knee sarcoma   - tyelnol 1g q8,   - RPN: Oxyocodne 5/10 q3 for mod/severe pain    #Leukocytosis resolved.   #Acute blood loss anemia - 10 from 10 from baseline 13.4. Asymptomatic    #Hypotension - 90/50 this AM. clinically asymptomatic    Plan  f/u RLE Doppler - changed priority to be ordered urgent, also CT Noncon of chest for mets evaluation  KAYCE Drain per ortho  Monitor for orthostasis, symptomatic anemia  PT - RLE toe touch wt bearing x1wk  CBC w diff, BMP in AM  Above d/w ortho ANAMIKA Ayala  
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 Doing well    - Cont knee slightly bent in knee immobilizer to offload tension off nerve repair  - Toe touch weight bearing x1 week  - cont KAYCE drains    PRS    
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 PPx: therapeutic lovenox per vascular sx. On bowel regimen and incentive spirometer  #R knee sarcoma   - tylenol 1g q8,   - RPN: Oxycodone 5/10 q3 for mod/severe pain  - gabapentin 300mg q8h   - CT Chest non-con showing some in RUL small pulmonary nodules with impaction and bronchiectasis - suggesting possible granulomatous disease previously. Pt denies any cough, dyspnea, fevers, dysphagia    #Prior h/o TB. s/p 3y treatment in [place] per patient   - Chest XR showing nodules and airway impaction suggesting prior granulomatous dz  #Leukocytosis resolved.   #Acute blood loss anemia - 9.8 from 11.5 today. baseline 13.4. Asymptomatic  - there was excessive bleeding noted from dressing - per plastic surgery note, DVT ppx on hold.  Follow up with vascular regarding AC.   - monitor for orthostatic hypotension   - with AM labs, patient reports no dizziness on my assessment today    #Hypotension - 90s/60s this AM. remains asymptomatic    50 minutes spent on this encounter, including face to face with patient, review of chart, care coordination and documentation.   Plan discussed with orthopedic team.         
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 Doing well    - Cont knee slightly bent in knee immobilizer to offload tension off nerve repair  - Toe touch weight bearing x1 week  - Continue leg elevation  - cont KAYCE drains
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 PPx: therapeutic lovenox per vascular sx. On bowel regimen and incentive spirometer  #R knee sarcoma   - tyelnol 1g q8,   - RPN: Oxyocodne 5/10 q3 for mod/severe pain   - CT Chest non-con showing some in RUL small pulmonary nodules with impaction and bronchiectasis - suggesting possible granulomatous disease previously. Pt denies any cough, dyspnea, fevers, dysphagia    #Prior h/o TB. s/p 3y treatment in [place] per patient   - Chest XR showing nodules and airway impaction suggesting prior granulomatous dz  #Leukocytosis resolved.   #Acute blood loss anemia - 10.8 from 11.5 today. baseline 13.4. Asymptomatic    #Hypotension - 90s/60s this AM. clinically asymptomatic    Plan  Monitor for orthostasis, symptomatic anemia  PT - RLE toe touch wt bearing x1wk  CBC w diff, BMP in AM  Above d/w ortho NP Derrek    
A/P:   54y YO Female with pmhx of sarcoma of posterior lateral right knee s/p tumor resection on 11/2024, now presenting for elective surgical resection of right popliteal fossa sarcoma with sacrifice of tibial nerve with reconstruction, vascular assisted with portion of case.    Recommendations:  -Patient seems to be experiencing incisional oozing/bleeding due to the extensive nature of her incisions. This is compounded by patient being on AC  -Can halve dose of Eliquis to 2.5 BID in the setting of bleeding  - Rest of care per primary team  - Please call [phone] with any questions or concerns
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 PPx: therapeutic lovenox. On bowel regimen and incentive spirometer  #R knee sarcoma   - tyelnol 1g q8,   - RPN: Oxyocodne 5/10 q3 for mod/severe pain   - CT Chest non-con showing some in RUL small pulmonary nodules with impaction and bronchiectasis - suggesting possible granulomatous disease previously. Pt denies any cough, dyspnea, fevers, dysphagia    #Leukocytosis resolved.   #Acute blood loss anemia - 11.6 today. baseline 13.4. Asymptomatic    #Hypotension - 90/50 this AM. clinically asymptomatic    Plan  Monitor for orthostasis, symptomatic anemia  PT - RLE toe touch wt bearing x1wk  CBC w diff, BMP in AM  Above d/w ortho resident AJ  
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 Doing well    - Cont knee slightly bent in knee immobilizer to offload tension off nerve repair  - Toe touch weight bearing x1 week  - Continue leg elevation  - cont KAYCE drains    Abe Moyer PGY4  Plastic Surgery  [id number]# [facility] pager  [phone] [facility] pager  [phone] [facility] pager  [facility] Green Team [id number]  Available on Teams 
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 Doing well    - Cont knee slightly bent in knee immobilizer to offload tension off nerve repair  - Toe touch weight bearing x1 week  - cont KAYCE drains    PRS    
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 Doing well    - Cont knee slightly bent in knee immobilizer to offload tension off nerve repair  - Toe touch weight bearing x1 week  - cont KAYCE drains    PRS    
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 Doing well    - continue slight knee flexion of RLE, light toe touch to partial weight bearing okay  - maintain sutures  - drain removed today  - cont apixaban per vascular surgery  - follow up with Dr. Noble  - williams for dc after Dr. Noble sees patient this afternoon     Abe Moyer PGY4  Plastic Surgery  [id number]# [facility] pager  [phone] [facility] pager  [phone] [facility] pager  [facility] Green Team [id number]  Available on Teams 
  A/P: 54y YO Female with pmhx of sarcoma of posterior lateral right knee s/p tumor resection on 11/2024, now presenting for elective surgical resection of right popliteal fossa sarcoma with sacrifice of tibial nerve with reconstruction, vascular assisted with portion of case.    Recommendations:  - Continue neurovascular and compartment checks  - C/W AC  - Rest of care per primary team  - Please call [phone] with any questions or concerns  
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 PPx: therapeutic lovenox. On bowel regimen and incentive spirometer  #R knee sarcoma   - tyelnol 1g q8,   - RPN: Oxyocodne 5/10 q3 for mod/severe pain   - CT Chest non-con showing some in RUL small pulmonary nodules with impaction and bronchiectasis - suggesting possible granulomatous disease previously. Pt denies any cough, dyspnea, fevers, dysphagia    #Prior h/o TB. s/p 3y treatment in [place] per patient   - Chest XR showing nodules and airway impaction suggesting prior granulomatous dz  #Leukocytosis resolved.   #Acute blood loss anemia - 11.5 today. baseline 13.4. Asymptomatic    #Hypotension - 90/50 this AM. clinically asymptomatic    Plan  Monitor for orthostasis, symptomatic anemia  Would re-discuss w vascular indication/duration of therapeutic lovenox  PT - RLE toe touch wt bearing x1wk  CBC w diff, BMP in AM  Above d/w ortho NP Derrek    DISPO: Home OT

## 2025-04-03 NOTE — PROGRESS NOTE ADULT - SUBJECTIVE AND OBJECTIVE BOX
DAILY PROGRESS NOTE    S:  Patient examined bedside, patient complaining of bleeding onto pillows and sheets.    O:     T(C): 36.6 (04-03-25 @ 08:10), Max: 36.6 (04-03-25 @ 05:29)  HR: 67 (04-03-25 @ 08:10) (61 - 76)  BP: 95/59 (04-03-25 @ 08:10) (90/50 - 102/65)  RR: 17 (04-03-25 @ 08:10) (15 - 17)  SpO2: 97% (04-03-25 @ 08:10) (96% - 98%)    Physical Exam:  Exam limited as patient was being changed on visit.        I/O (24h)    02 Apr 2025 07:01  -  03 Apr 2025 07:00  --------------------------------------------------------  IN:    Oral Fluid: 480 mL  Total IN: 480 mL    OUT:    Bulb (mL): 7 mL    Voided (mL): 1360 mL  Total OUT: 1367 mL    Total NET: -887 mL      03 Apr 2025 07:01  -  03 Apr 2025 11:19  --------------------------------------------------------  IN:    Oral Fluid: 360 mL  Total IN: 360 mL    OUT:    Voided (mL): 1100 mL  Total OUT: 1100 mL    Total NET: -740 mL          Labs:     LABS:  cret                        9.2    7.71  )-----------( 278      ( 03 Apr 2025 08:52 )             27.4     04-03    139  |  103  |  7   ----------------------------<  96  3.8   |  26  |  0.39[L]    Ca    8.5      03 Apr 2025 08:52

## 2025-04-04 ENCOUNTER — APPOINTMENT (OUTPATIENT)
Dept: ORTHOPEDIC SURGERY | Facility: CLINIC | Age: 55
End: 2025-04-04

## 2025-04-08 NOTE — CHART NOTE - NSCHARTNOTEFT_GEN_A_CORE
Post-Discharge Medication Review: Completed	  	  Patient's preferred pharmacy was updated in [facility]: [facility]	  	  Patient contacted to offer medication counseling post-discharge. Medication reconciliation completed. Per patient, medications include:	  1.	acetaminophen 500 mg oral tablet 2 tab(s) orally every 8 hours MDD: 3  2.	apixaban 2.5 mg oral tablet 1 tab(s) orally 2 times a day MDD: 2  3.	gabapentin 300 mg oral capsule 1 cap(s) orally every 8 hours MDD: 3  4.	oxyCODONE 5 mg oral tablet 1 tab(s) orally every 4 hours as needed for severe pain MDD: 6  5.	senna leaf extract oral tablet 2 tab(s) orally once a day (at bedtime)  	  Medications removed from OMR (updated per discussion with patient):	  1.	magnesium hydroxide 8% oral suspension 30 milliliter(s) orally once a day As needed Constipation  2.	ondansetron 4 mg oral tablet 1 tab(s) orally every 8 hours as needed for nausea MDD: 3  	  Medication name, indication, administration, side effect, and monitoring reviewed for new medications during post discharge counseling visit with patient. Patient demonstrated understanding. Counseling offered for all medications.	  	  Abdi Ruiz, RenardD	  Clinical Pharmacy Specialist, Pharmacy Telehealth Team	  Can be reached via [place] Teams or [phone]

## 2025-04-10 ENCOUNTER — APPOINTMENT (OUTPATIENT)
Dept: PLASTIC SURGERY | Facility: CLINIC | Age: 55
End: 2025-04-10
Payer: COMMERCIAL

## 2025-04-10 PROCEDURE — 99024 POSTOP FOLLOW-UP VISIT: CPT

## 2025-04-10 RX ORDER — GABAPENTIN 600 MG/1
TABLET ORAL
Refills: 0 | Status: ACTIVE | COMMUNITY

## 2025-04-10 RX ORDER — APIXABAN 2.5 MG/1
2.5 TABLET, FILM COATED ORAL TWICE DAILY
Qty: 14 | Refills: 0 | Status: ACTIVE | COMMUNITY
Start: 2025-04-10 | End: 1900-01-01

## 2025-04-10 RX ORDER — APIXABAN 5 MG/1
TABLET, FILM COATED ORAL
Refills: 0 | Status: ACTIVE | COMMUNITY

## 2025-04-10 RX ORDER — GABAPENTIN 300 MG/1
300 CAPSULE ORAL 3 TIMES DAILY
Qty: 90 | Refills: 0 | Status: ACTIVE | COMMUNITY
Start: 2025-04-10 | End: 1900-01-01

## 2025-04-14 ENCOUNTER — APPOINTMENT (OUTPATIENT)
Dept: BARIATRICS/WEIGHT MGMT | Facility: CLINIC | Age: 55
End: 2025-04-14

## 2025-04-14 DIAGNOSIS — G89.18 OTHER ACUTE POSTPROCEDURAL PAIN: ICD-10-CM

## 2025-04-18 RX ORDER — OXYCODONE AND ACETAMINOPHEN 5; 325 MG/1; MG/1
5-325 TABLET ORAL
Qty: 30 | Refills: 0 | Status: ACTIVE | COMMUNITY
Start: 2025-04-18 | End: 1900-01-01

## 2025-04-18 RX ORDER — OXYCODONE 5 MG/1
5 TABLET ORAL EVERY 6 HOURS
Qty: 5 | Refills: 0 | Status: COMPLETED | COMMUNITY
Start: 2025-04-10 | End: 2025-04-18

## 2025-04-18 RX ORDER — OXYCODONE 5 MG/1
5 TABLET ORAL
Qty: 42 | Refills: 0 | Status: DISCONTINUED | COMMUNITY
Start: 2025-04-14 | End: 2025-04-18

## 2025-04-18 RX ORDER — OXYCODONE AND ACETAMINOPHEN 5; 325 MG/1; MG/1
5-325 TABLET ORAL
Qty: 30 | Refills: 0 | Status: COMPLETED | COMMUNITY
Start: 2025-04-14 | End: 2025-04-18

## 2025-04-21 RX ORDER — OXYCODONE 5 MG/1
5 TABLET ORAL
Qty: 40 | Refills: 0 | Status: ACTIVE | COMMUNITY
Start: 2025-04-21 | End: 1900-01-01

## 2025-04-24 ENCOUNTER — APPOINTMENT (OUTPATIENT)
Dept: PLASTIC SURGERY | Facility: CLINIC | Age: 55
End: 2025-04-24
Payer: COMMERCIAL

## 2025-04-24 DIAGNOSIS — I89.0 LYMPHEDEMA, NOT ELSEWHERE CLASSIFIED: ICD-10-CM

## 2025-04-24 DIAGNOSIS — M79.89 OTHER SPECIFIED SOFT TISSUE DISORDERS: ICD-10-CM

## 2025-04-24 DIAGNOSIS — Z09 ENCOUNTER FOR FOLLOW-UP EXAMINATION AFTER COMPLETED TREATMENT FOR CONDITIONS OTHER THAN MALIGNANT NEOPLASM: ICD-10-CM

## 2025-04-24 PROBLEM — C49.20: Chronic | Status: ACTIVE | Noted: 2025-03-19

## 2025-04-24 PROCEDURE — 99024 POSTOP FOLLOW-UP VISIT: CPT

## 2025-04-24 RX ORDER — CEFADROXIL 500 MG/1
500 CAPSULE ORAL TWICE DAILY
Qty: 20 | Refills: 0 | Status: ACTIVE | COMMUNITY
Start: 2025-04-24 | End: 1900-01-01

## 2025-04-25 ENCOUNTER — APPOINTMENT (OUTPATIENT)
Dept: ORTHOPEDIC SURGERY | Facility: CLINIC | Age: 55
End: 2025-04-25
Payer: COMMERCIAL

## 2025-04-25 DIAGNOSIS — C49.9 MALIGNANT NEOPLASM OF CONNECTIVE AND SOFT TISSUE, UNSPECIFIED: ICD-10-CM

## 2025-04-25 PROCEDURE — 99024 POSTOP FOLLOW-UP VISIT: CPT

## 2025-04-25 RX ORDER — GABAPENTIN 300 MG/1
300 CAPSULE ORAL
Qty: 90 | Refills: 2 | Status: ACTIVE | COMMUNITY
Start: 2025-04-25 | End: 1900-01-01

## 2025-04-28 ENCOUNTER — APPOINTMENT (OUTPATIENT)
Age: 55
End: 2025-04-28

## 2025-04-29 ENCOUNTER — APPOINTMENT (OUTPATIENT)
Dept: VASCULAR SURGERY | Facility: CLINIC | Age: 55
End: 2025-04-29

## 2025-05-05 ENCOUNTER — NON-APPOINTMENT (OUTPATIENT)
Age: 55
End: 2025-05-05

## 2025-05-06 ENCOUNTER — APPOINTMENT (OUTPATIENT)
Dept: PAIN MANAGEMENT | Facility: CLINIC | Age: 55
End: 2025-05-06
Payer: COMMERCIAL

## 2025-05-06 VITALS
SYSTOLIC BLOOD PRESSURE: 115 MMHG | BODY MASS INDEX: 27.38 KG/M2 | OXYGEN SATURATION: 99 % | HEART RATE: 69 BPM | WEIGHT: 145 LBS | HEIGHT: 61 IN | DIASTOLIC BLOOD PRESSURE: 79 MMHG

## 2025-05-06 DIAGNOSIS — M79.10 MYALGIA, UNSPECIFIED SITE: ICD-10-CM

## 2025-05-06 DIAGNOSIS — M79.2 NEURALGIA AND NEURITIS, UNSPECIFIED: ICD-10-CM

## 2025-05-06 PROCEDURE — 99204 OFFICE O/P NEW MOD 45 MIN: CPT

## 2025-05-06 RX ORDER — CYCLOBENZAPRINE 10 MG/1
10 TABLET ORAL
Qty: 60 | Refills: 5 | Status: ACTIVE | COMMUNITY
Start: 2025-05-06 | End: 1900-01-01

## 2025-05-06 RX ORDER — ACETAMINOPHEN 500 MG/1
500 TABLET ORAL
Qty: 50 | Refills: 0 | Status: DISCONTINUED | COMMUNITY
Start: 2025-04-30 | End: 2025-05-06

## 2025-05-06 RX ORDER — GABAPENTIN 400 MG/1
400 CAPSULE ORAL
Qty: 90 | Refills: 1 | Status: ACTIVE | COMMUNITY
Start: 2025-05-06 | End: 1900-01-01

## 2025-05-07 ENCOUNTER — APPOINTMENT (OUTPATIENT)
Dept: PLASTIC SURGERY | Facility: CLINIC | Age: 55
End: 2025-05-07

## 2025-05-09 DIAGNOSIS — I89.0 LYMPHEDEMA, NOT ELSEWHERE CLASSIFIED: ICD-10-CM

## 2025-05-09 DIAGNOSIS — C49.9 MALIGNANT NEOPLASM OF CONNECTIVE AND SOFT TISSUE, UNSPECIFIED: ICD-10-CM

## 2025-05-09 DIAGNOSIS — G57.41 LESION OF MEDIAL POPLITEAL NERVE, RIGHT LOWER LIMB: ICD-10-CM

## 2025-05-21 ENCOUNTER — APPOINTMENT (OUTPATIENT)
Dept: PAIN MANAGEMENT | Facility: CLINIC | Age: 55
End: 2025-05-21

## 2025-05-29 ENCOUNTER — APPOINTMENT (OUTPATIENT)
Dept: PLASTIC SURGERY | Facility: CLINIC | Age: 55
End: 2025-05-29
Payer: COMMERCIAL

## 2025-05-29 PROCEDURE — 99024 POSTOP FOLLOW-UP VISIT: CPT

## 2025-05-30 ENCOUNTER — APPOINTMENT (OUTPATIENT)
Dept: PAIN MANAGEMENT | Facility: CLINIC | Age: 55
End: 2025-05-30

## 2025-05-30 DIAGNOSIS — M79.2 NEURALGIA AND NEURITIS, UNSPECIFIED: ICD-10-CM

## 2025-05-30 DIAGNOSIS — G57.41 LESION OF MEDIAL POPLITEAL NERVE, RIGHT LOWER LIMB: ICD-10-CM

## 2025-05-30 PROCEDURE — 99213 OFFICE O/P EST LOW 20 MIN: CPT | Mod: 95

## 2025-06-13 ENCOUNTER — APPOINTMENT (OUTPATIENT)
Dept: ORTHOPEDIC SURGERY | Facility: CLINIC | Age: 55
End: 2025-06-13
Payer: COMMERCIAL

## 2025-06-13 PROCEDURE — 99024 POSTOP FOLLOW-UP VISIT: CPT

## 2025-06-18 ENCOUNTER — APPOINTMENT (OUTPATIENT)
Dept: BARIATRICS/WEIGHT MGMT | Facility: CLINIC | Age: 55
End: 2025-06-18
Payer: COMMERCIAL

## 2025-06-18 VITALS — BODY MASS INDEX: 27.94 KG/M2 | WEIGHT: 148 LBS | HEIGHT: 61 IN

## 2025-06-18 PROCEDURE — 99213 OFFICE O/P EST LOW 20 MIN: CPT | Mod: 95

## 2025-06-24 ENCOUNTER — APPOINTMENT (OUTPATIENT)
Dept: RADIATION ONCOLOGY | Facility: CLINIC | Age: 55
End: 2025-06-24

## 2025-06-24 VITALS
HEIGHT: 61 IN | RESPIRATION RATE: 18 BRPM | OXYGEN SATURATION: 95 % | WEIGHT: 145 LBS | BODY MASS INDEX: 27.38 KG/M2 | DIASTOLIC BLOOD PRESSURE: 70 MMHG | HEART RATE: 86 BPM | SYSTOLIC BLOOD PRESSURE: 105 MMHG | TEMPERATURE: 97.7 F

## 2025-06-24 PROCEDURE — 99205 OFFICE O/P NEW HI 60 MIN: CPT

## 2025-06-24 PROCEDURE — G2211 COMPLEX E/M VISIT ADD ON: CPT

## 2025-06-24 RX ORDER — PHENTERMINE HYDROCHLORIDE 15 MG/1
15 CAPSULE ORAL
Qty: 30 | Refills: 1 | Status: COMPLETED | COMMUNITY
Start: 2025-06-18 | End: 2025-06-24

## 2025-06-25 ENCOUNTER — RESULT REVIEW (OUTPATIENT)
Age: 55
End: 2025-06-25

## 2025-06-27 ENCOUNTER — APPOINTMENT (OUTPATIENT)
Dept: RADIATION ONCOLOGY | Facility: CLINIC | Age: 55
End: 2025-06-27
Payer: COMMERCIAL

## 2025-06-27 VITALS
SYSTOLIC BLOOD PRESSURE: 109 MMHG | DIASTOLIC BLOOD PRESSURE: 71 MMHG | TEMPERATURE: 98 F | RESPIRATION RATE: 16 BRPM | OXYGEN SATURATION: 99 % | HEART RATE: 71 BPM

## 2025-06-27 PROCEDURE — 99204 OFFICE O/P NEW MOD 45 MIN: CPT

## 2025-07-10 ENCOUNTER — APPOINTMENT (OUTPATIENT)
Dept: PHYSICAL MEDICINE AND REHAB | Facility: CLINIC | Age: 55
End: 2025-07-10

## 2025-07-10 ENCOUNTER — NON-APPOINTMENT (OUTPATIENT)
Age: 55
End: 2025-07-10

## 2025-07-11 ENCOUNTER — RESULT REVIEW (OUTPATIENT)
Age: 55
End: 2025-07-11

## 2025-07-16 ENCOUNTER — APPOINTMENT (OUTPATIENT)
Dept: BARIATRICS/WEIGHT MGMT | Facility: CLINIC | Age: 55
End: 2025-07-16

## 2025-07-21 ENCOUNTER — APPOINTMENT (OUTPATIENT)
Dept: BARIATRICS/WEIGHT MGMT | Facility: CLINIC | Age: 55
End: 2025-07-21
Payer: COMMERCIAL

## 2025-07-21 VITALS — WEIGHT: 148 LBS | BODY MASS INDEX: 27.94 KG/M2 | HEIGHT: 61 IN

## 2025-07-21 DIAGNOSIS — E66.3 OVERWEIGHT: ICD-10-CM

## 2025-07-21 DIAGNOSIS — E78.5 HYPERLIPIDEMIA, UNSPECIFIED: ICD-10-CM

## 2025-07-21 PROCEDURE — 99213 OFFICE O/P EST LOW 20 MIN: CPT | Mod: 95

## 2025-07-21 RX ORDER — PHENTERMINE HYDROCHLORIDE 30 MG/1
30 CAPSULE ORAL
Qty: 30 | Refills: 1 | Status: ACTIVE | COMMUNITY
Start: 2025-07-21 | End: 1900-01-01

## 2025-07-21 RX ORDER — SEMAGLUTIDE 0.25 MG/.5ML
0.25 INJECTION, SOLUTION SUBCUTANEOUS
Qty: 1 | Refills: 0 | Status: ACTIVE | COMMUNITY
Start: 2025-07-21 | End: 1900-01-01

## 2025-07-22 ENCOUNTER — NON-APPOINTMENT (OUTPATIENT)
Age: 55
End: 2025-07-22

## 2025-07-23 ENCOUNTER — NON-APPOINTMENT (OUTPATIENT)
Age: 55
End: 2025-07-23

## 2025-07-28 VITALS
WEIGHT: 148 LBS | RESPIRATION RATE: 14 BRPM | OXYGEN SATURATION: 99 % | DIASTOLIC BLOOD PRESSURE: 75 MMHG | SYSTOLIC BLOOD PRESSURE: 115 MMHG | BODY MASS INDEX: 27.96 KG/M2 | HEART RATE: 74 BPM

## 2025-07-29 ENCOUNTER — APPOINTMENT (OUTPATIENT)
Dept: ORTHOPEDIC SURGERY | Facility: CLINIC | Age: 55
End: 2025-07-29
Payer: COMMERCIAL

## 2025-07-29 DIAGNOSIS — C49.9 MALIGNANT NEOPLASM OF CONNECTIVE AND SOFT TISSUE, UNSPECIFIED: ICD-10-CM

## 2025-07-29 PROCEDURE — 99214 OFFICE O/P EST MOD 30 MIN: CPT | Mod: 95

## 2025-07-30 ENCOUNTER — NON-APPOINTMENT (OUTPATIENT)
Age: 55
End: 2025-07-30

## 2025-07-30 VITALS
WEIGHT: 148 LBS | RESPIRATION RATE: 14 BRPM | OXYGEN SATURATION: 99 % | DIASTOLIC BLOOD PRESSURE: 75 MMHG | BODY MASS INDEX: 27.96 KG/M2 | HEART RATE: 74 BPM | SYSTOLIC BLOOD PRESSURE: 115 MMHG

## 2025-08-05 ENCOUNTER — NON-APPOINTMENT (OUTPATIENT)
Age: 55
End: 2025-08-05

## 2025-08-05 ENCOUNTER — APPOINTMENT (OUTPATIENT)
Dept: INTERNAL MEDICINE | Facility: CLINIC | Age: 55
End: 2025-08-05

## 2025-08-05 ENCOUNTER — TRANSCRIPTION ENCOUNTER (OUTPATIENT)
Age: 55
End: 2025-08-05

## 2025-08-05 ENCOUNTER — OUTPATIENT (OUTPATIENT)
Dept: OUTPATIENT SERVICES | Facility: HOSPITAL | Age: 55
LOS: 1 days | End: 2025-08-05

## 2025-08-05 VITALS
SYSTOLIC BLOOD PRESSURE: 103 MMHG | RESPIRATION RATE: 16 BRPM | HEART RATE: 67 BPM | DIASTOLIC BLOOD PRESSURE: 68 MMHG | OXYGEN SATURATION: 99 %

## 2025-08-05 DIAGNOSIS — Z98.890 OTHER SPECIFIED POSTPROCEDURAL STATES: Chronic | ICD-10-CM

## 2025-08-05 DIAGNOSIS — Z98.82 BREAST IMPLANT STATUS: Chronic | ICD-10-CM

## 2025-08-06 ENCOUNTER — TRANSCRIPTION ENCOUNTER (OUTPATIENT)
Age: 55
End: 2025-08-06

## 2025-08-07 ENCOUNTER — APPOINTMENT (OUTPATIENT)
Dept: PHYSICAL MEDICINE AND REHAB | Facility: CLINIC | Age: 55
End: 2025-08-07
Payer: COMMERCIAL

## 2025-08-07 VITALS — HEART RATE: 69 BPM | SYSTOLIC BLOOD PRESSURE: 105 MMHG | DIASTOLIC BLOOD PRESSURE: 70 MMHG

## 2025-08-07 PROCEDURE — 99205 OFFICE O/P NEW HI 60 MIN: CPT

## 2025-08-12 ENCOUNTER — NON-APPOINTMENT (OUTPATIENT)
Age: 55
End: 2025-08-12

## 2025-08-12 VITALS
SYSTOLIC BLOOD PRESSURE: 115 MMHG | OXYGEN SATURATION: 100 % | HEART RATE: 63 BPM | RESPIRATION RATE: 16 BRPM | DIASTOLIC BLOOD PRESSURE: 72 MMHG

## 2025-08-20 ENCOUNTER — NON-APPOINTMENT (OUTPATIENT)
Age: 55
End: 2025-08-20

## 2025-08-20 VITALS
HEART RATE: 69 BPM | RESPIRATION RATE: 16 BRPM | OXYGEN SATURATION: 98 % | SYSTOLIC BLOOD PRESSURE: 113 MMHG | DIASTOLIC BLOOD PRESSURE: 78 MMHG

## 2025-08-26 ENCOUNTER — NON-APPOINTMENT (OUTPATIENT)
Age: 55
End: 2025-08-26

## 2025-08-26 VITALS
OXYGEN SATURATION: 99 % | HEART RATE: 68 BPM | DIASTOLIC BLOOD PRESSURE: 65 MMHG | SYSTOLIC BLOOD PRESSURE: 102 MMHG | RESPIRATION RATE: 16 BRPM

## 2025-08-27 ENCOUNTER — APPOINTMENT (OUTPATIENT)
Dept: BARIATRICS/WEIGHT MGMT | Facility: CLINIC | Age: 55
End: 2025-08-27

## 2025-08-28 ENCOUNTER — APPOINTMENT (OUTPATIENT)
Dept: PHYSICAL MEDICINE AND REHAB | Facility: CLINIC | Age: 55
End: 2025-08-28
Payer: COMMERCIAL

## 2025-08-28 PROCEDURE — 99214 OFFICE O/P EST MOD 30 MIN: CPT

## 2025-09-02 ENCOUNTER — RX RENEWAL (OUTPATIENT)
Age: 55
End: 2025-09-02

## 2025-09-04 ENCOUNTER — NON-APPOINTMENT (OUTPATIENT)
Age: 55
End: 2025-09-04

## 2025-09-04 VITALS
DIASTOLIC BLOOD PRESSURE: 67 MMHG | RESPIRATION RATE: 16 BRPM | OXYGEN SATURATION: 99 % | SYSTOLIC BLOOD PRESSURE: 101 MMHG | HEART RATE: 75 BPM

## 2025-09-04 DIAGNOSIS — C49.9 MALIGNANT NEOPLASM OF CONNECTIVE AND SOFT TISSUE, UNSPECIFIED: ICD-10-CM

## 2025-09-05 RX ORDER — SILVER SULFADIAZINE 10 MG/G
1 CREAM TOPICAL TWICE DAILY
Qty: 1 | Refills: 2 | Status: ACTIVE | COMMUNITY
Start: 2025-09-05 | End: 1900-01-01

## 2025-09-15 ENCOUNTER — RESULT REVIEW (OUTPATIENT)
Age: 55
End: 2025-09-15

## 2025-09-15 ENCOUNTER — APPOINTMENT (OUTPATIENT)
Dept: DERMATOLOGY | Facility: CLINIC | Age: 55
End: 2025-09-15

## 2025-09-15 ENCOUNTER — APPOINTMENT (OUTPATIENT)
Dept: DERMATOLOGY | Facility: CLINIC | Age: 55
End: 2025-09-15
Payer: COMMERCIAL

## 2025-09-15 VITALS — WEIGHT: 145 LBS | BODY MASS INDEX: 27.38 KG/M2 | HEIGHT: 61 IN

## 2025-09-15 PROCEDURE — 99203 OFFICE O/P NEW LOW 30 MIN: CPT

## 2025-09-16 ENCOUNTER — APPOINTMENT (OUTPATIENT)
Dept: BARIATRICS/WEIGHT MGMT | Facility: CLINIC | Age: 55
End: 2025-09-16
Payer: COMMERCIAL

## 2025-09-16 VITALS — BODY MASS INDEX: 27.4 KG/M2 | WEIGHT: 145 LBS

## 2025-09-16 DIAGNOSIS — E66.3 OVERWEIGHT: ICD-10-CM

## 2025-09-16 DIAGNOSIS — E78.5 HYPERLIPIDEMIA, UNSPECIFIED: ICD-10-CM

## 2025-09-16 PROCEDURE — 99213 OFFICE O/P EST LOW 20 MIN: CPT | Mod: 95

## 2025-09-18 ENCOUNTER — NON-APPOINTMENT (OUTPATIENT)
Age: 55
End: 2025-09-18

## 2025-09-18 ENCOUNTER — APPOINTMENT (OUTPATIENT)
Dept: PLASTIC SURGERY | Facility: CLINIC | Age: 55
End: 2025-09-18
Payer: COMMERCIAL

## 2025-09-18 DIAGNOSIS — T30.0 BURN OF UNSPECIFIED BODY REGION, UNSPECIFIED DEGREE: ICD-10-CM

## 2025-09-18 PROCEDURE — 99213 OFFICE O/P EST LOW 20 MIN: CPT

## 2025-09-18 RX ORDER — GAUZE BANDAGE 4" X 4"
SPONGE TOPICAL
Qty: 20 | Refills: 0 | Status: ACTIVE | COMMUNITY
Start: 2025-09-18 | End: 1900-01-01

## 2025-09-19 ENCOUNTER — NON-APPOINTMENT (OUTPATIENT)
Age: 55
End: 2025-09-19

## 2025-09-24 PROBLEM — D48.5 NEOPLASM OF UNCERTAIN BEHAVIOR OF SKIN: Status: ACTIVE | Noted: 2025-09-15

## 2025-09-24 PROBLEM — L03.011 PARONYCHIA OF FINGER OF RIGHT HAND: Status: ACTIVE | Noted: 2025-09-24

## (undated) DEVICE — DRAPE TOWEL WHITE 18" X 26"

## (undated) DEVICE — DRSG DERMABOND PRINEO 22CM

## (undated) DEVICE — MARKING PEN W RULER

## (undated) DEVICE — ULTRASOUND GEL 0.25L

## (undated) DEVICE — SUT NYLON 2-0 18" FS

## (undated) DEVICE — BIPOLAR FORCEP KOGENT IRRIGATING STRAIGHT 0.5MM X 7" DISP

## (undated) DEVICE — GLV 7.5 PROTEXIS (WHITE)

## (undated) DEVICE — Device

## (undated) DEVICE — PACK BREAST RECONSTRUCTION

## (undated) DEVICE — SUT MONOCRYL 4-0 18" PS-2

## (undated) DEVICE — DRSG DERMABOND 0.7ML

## (undated) DEVICE — ARACHNOID CIRCULAR SUPERFICIAL HANDLE 5"

## (undated) DEVICE — SUT PROLENE 6-0 30" RB-2

## (undated) DEVICE — SUT VICRYL PLUS 3-0 27" PS-2 UNDYED

## (undated) DEVICE — GLV 8 PROTEXIS (WHITE)

## (undated) DEVICE — PACK VASCULAR MINOR

## (undated) DEVICE — DRAPE SURGICAL #1010

## (undated) DEVICE — DRSG GAUZE MOISTURIZER 0.5 OZ 4X8

## (undated) DEVICE — CANISTER SPECIMEN CONVERTOR PLASTIC

## (undated) DEVICE — SUT PROLENE 7-0 18" BV

## (undated) DEVICE — DRSG MASTISOL

## (undated) DEVICE — SUT MONOCRYL 3-0 18" PS-1

## (undated) DEVICE — DRAPE IOBAN 23" X 23"

## (undated) DEVICE — NEPTUNE II 4-PORT MANIFOLD

## (undated) DEVICE — VESSEL LOOP MAXI-BLUE 0.120" X 16"

## (undated) DEVICE — DRSG KERLIX ROLL LG 4.5"

## (undated) DEVICE — SUT SILK 2-0 12-18"

## (undated) DEVICE — VENODYNE/SCD SLEEVE CALF MEDIUM

## (undated) DEVICE — STAPLER SKIN PROXIMATE

## (undated) DEVICE — WARMING BLANKET UPPER ADULT

## (undated) DEVICE — SUT PROLENE 5-0 36" RB-1

## (undated) DEVICE — DRAIN RESERVOIR FOR JACKSON PRATT 100CC CARDINAL

## (undated) DEVICE — ELCTR STRYKER NEPTUNE SMOKE EVACUATION PENCIL (GREEN)

## (undated) DEVICE — POSITIONER FOAM EGG CRATE ULNAR 2PCS (PINK)

## (undated) DEVICE — CLIPPER BLADE NEURO (BLUE)

## (undated) DEVICE — SUT VICRYL 2-0 27" CT-1 UNDYED

## (undated) DEVICE — DRAPE IOBAN 13" X 13"

## (undated) DEVICE — ONETRAC LIGHTED RETRACTOR 135 X 30MM DISP

## (undated) DEVICE — CLIPPER BLADE GENERAL USE

## (undated) DEVICE — NEPTUNE 4-PORT MANIFOLD STANDARD

## (undated) DEVICE — DRSG ACE BANDAGE 6"

## (undated) DEVICE — SUT SILK 4-0 12-30" TIES

## (undated) DEVICE — WARMING BLANKET LOWER ADULT

## (undated) DEVICE — PREP CHLORAPREP HI-LITE ORANGE 26ML

## (undated) DEVICE — SUT MONOCRYL 4-0 27" PS-2 UNDYED

## (undated) DEVICE — VESSEL LOOP MINI-BLUE 0.075" X 16"

## (undated) DEVICE — GLV 8 SENSICARE W ALOE

## (undated) DEVICE — SUT VICRYL 4-0 27" SH UNDYED

## (undated) DEVICE — DRSG TEGADERM 4 X 4.75"

## (undated) DEVICE — ELCTR GROUNDING PAD ADULT COVIDIEN

## (undated) DEVICE — PACK UPPER BODY

## (undated) DEVICE — TUBING SUCTION NONCONDUCTIVE 6MM X 12FT

## (undated) DEVICE — PACK KNEE ARTHROSCOPY

## (undated) DEVICE — SUT SILK 2-0 18" SH (POP-OFF)

## (undated) DEVICE — WARMING BLANKET FULL UNDERBODY ADULT

## (undated) DEVICE — ELCTR BOVIE TIP BLADE INSULATED 2.75" EDGE